# Patient Record
Sex: MALE | Race: WHITE | Employment: FULL TIME | URBAN - METROPOLITAN AREA
[De-identification: names, ages, dates, MRNs, and addresses within clinical notes are randomized per-mention and may not be internally consistent; named-entity substitution may affect disease eponyms.]

---

## 2018-05-16 ENCOUNTER — OFFICE VISIT (OUTPATIENT)
Dept: ORTHOPEDIC SURGERY | Age: 63
End: 2018-05-16

## 2018-05-16 VITALS
HEART RATE: 75 BPM | WEIGHT: 158 LBS | HEIGHT: 62 IN | DIASTOLIC BLOOD PRESSURE: 73 MMHG | BODY MASS INDEX: 29.08 KG/M2 | SYSTOLIC BLOOD PRESSURE: 131 MMHG

## 2018-05-16 DIAGNOSIS — M79.645 FINGER PAIN, LEFT: Primary | ICD-10-CM

## 2018-05-16 DIAGNOSIS — M18.12 ARTHRITIS OF CARPOMETACARPAL (CMC) JOINT OF LEFT THUMB: ICD-10-CM

## 2018-05-16 PROCEDURE — 99203 OFFICE O/P NEW LOW 30 MIN: CPT | Performed by: ORTHOPAEDIC SURGERY

## 2018-05-16 PROCEDURE — 20600 DRAIN/INJ JOINT/BURSA W/O US: CPT | Performed by: ORTHOPAEDIC SURGERY

## 2018-05-16 RX ORDER — MULTIVIT-MIN/IRON/FOLIC ACID/K 18-600-40
1 CAPSULE ORAL DAILY
COMMUNITY

## 2018-05-16 RX ORDER — SUMATRIPTAN 100 MG/1
100 TABLET, FILM COATED ORAL
COMMUNITY

## 2018-09-24 ENCOUNTER — TELEPHONE (OUTPATIENT)
Dept: ORTHOPEDIC SURGERY | Age: 63
End: 2018-09-24

## 2018-10-02 ENCOUNTER — OFFICE VISIT (OUTPATIENT)
Dept: ORTHOPEDIC SURGERY | Age: 63
End: 2018-10-02
Payer: COMMERCIAL

## 2018-10-02 DIAGNOSIS — M18.12 ARTHRITIS OF CARPOMETACARPAL (CMC) JOINT OF LEFT THUMB: Primary | ICD-10-CM

## 2018-10-02 PROCEDURE — 99213 OFFICE O/P EST LOW 20 MIN: CPT | Performed by: ORTHOPAEDIC SURGERY

## 2018-10-02 PROCEDURE — 20600 DRAIN/INJ JOINT/BURSA W/O US: CPT | Performed by: ORTHOPAEDIC SURGERY

## 2019-01-02 RX ORDER — METHYLPREDNISOLONE 4 MG/1
TABLET ORAL
Qty: 1 KIT | Refills: 0 | Status: SHIPPED | OUTPATIENT
Start: 2019-01-02 | End: 2019-01-08

## 2019-01-10 ENCOUNTER — TELEPHONE (OUTPATIENT)
Dept: ORTHOPEDIC SURGERY | Age: 64
End: 2019-01-10

## 2019-04-02 ENCOUNTER — OFFICE VISIT (OUTPATIENT)
Dept: ORTHOPEDIC SURGERY | Age: 64
End: 2019-04-02
Payer: MEDICARE

## 2019-04-02 DIAGNOSIS — M18.12 ARTHRITIS OF CARPOMETACARPAL (CMC) JOINT OF LEFT THUMB: Primary | ICD-10-CM

## 2019-04-02 PROCEDURE — 3017F COLORECTAL CA SCREEN DOC REV: CPT | Performed by: ORTHOPAEDIC SURGERY

## 2019-04-02 PROCEDURE — 1036F TOBACCO NON-USER: CPT | Performed by: ORTHOPAEDIC SURGERY

## 2019-04-02 PROCEDURE — G8419 CALC BMI OUT NRM PARAM NOF/U: HCPCS | Performed by: ORTHOPAEDIC SURGERY

## 2019-04-02 PROCEDURE — G8427 DOCREV CUR MEDS BY ELIG CLIN: HCPCS | Performed by: ORTHOPAEDIC SURGERY

## 2019-04-02 PROCEDURE — 99213 OFFICE O/P EST LOW 20 MIN: CPT | Performed by: ORTHOPAEDIC SURGERY

## 2019-04-02 NOTE — PROGRESS NOTES
Assessment: 79-year-old male with long-standing history of left thumb base pain  1. Left thumb CMC arthritis  2. 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 We discussed his x-rays today and also reviewed prior x-rays from May 2018 which do demonstrate degenerative changes in the wrist. He does have some minimal symptoms of wrist pain occasionally but on examination clinically as well as his description certainly the majority of his symptoms seem to be related more to the [facility] joint. He has also benefited in the past significantly from corticosteroid injections into the thumb [facility] joint  Options for treatment were discussed today including both operative and nonoperative intervention. The patient would like to consider operative intervention with left thumb CMC arthroplasty, trapezi ectomy with ligament reconstruction and tendon interposition using FCR tendon  Postoperative recovery including immobilization followed by hand therapy and limited use of the hand for 3 months was discussed with patient  Other risks benefits alternatives of surgery were discussed with patient at length today including but not limited to infection and bleeding damage to nerves and blood vessels need for additional procedures continued pain stiffness wrist stiffness numbness and tingling as well as risks of anesthesia including stroke heart attack blood clot and death  After thorough discussion patient is understanding would like to proceed with a forementioned left thumb CMC arthroplasty. We'll begin the process of surgery scheduling today, consent obtained in clinic    No follow-ups on file.          History of Present Illness  Jordin Strickland is a 58 y.o. male here today for a follow-up for his left thumb pain consistent with thumb CMC arthritis  The patient continues to describe pain near the base of his thumb with pinching gripping activities  He has been treated conservatively now for several years with an corticosteroid injection performed most recently by me in October 2018  Has had transient benefit in the past but the most recent injection did give him the least amount of benefit  He does continue with activity modification as well but continues to have symptoms. He is here today to discuss additional treatment options    Review of Systems  Pertinent items are noted in HPI  Review of systems reviewed from Patient History Form dated on 5/6/18 and available in the patient's chart under the Media tab. Vital Signs  There were no vitals filed for this visit. Physical Exam  Constitutional:  Patient is well-nourished and demonstrates normal hygiene. Mental Status:  Patient is alert and oriented to person, place and time. Skin:  Intact, no rashes or lesions. Left thumb(s):  No skin changes throughout left thumb and left hand  Full composite fist and full extension of all fingers and symmetrical range of motion of thumb compared with contralateral thumb  Tenderness is elicited with CMC grind  , crepitus with thumb CMC motion        There is pain on firm pressure over the trapeziometacarpal joint. Satisfactory stability          exists at the MP joint with slight hyperextension with active pinch MP joints apparent 10° proximally. 0  There is minimal tenderness to radial styloid and anatomic snuffbox, minimal discomfort with radial and ulnar deviation with no crepitus  Wrist range of motion approximately 55° of extension and 60° of flexion  Nontender thumb MP joint with no mechanical symptoms of clicking, catching or locking  No thenar atrophy or intrinsic wasting throughout left thumb and hand  5 out of 5 active EPL, FPL, thumb abduction           Contralateral thumb:  No tenderness with CMC gring or firm pressure over                                  trapeziometacarpal joint.  Satisfactory stability exists at MP joint.           Capillary refill brisk all fingers, symmetric  Gross sensation intact to light touch median/ulnar/radial nerves  Sensation intact to radial/ulnar aspect of fingertip        Radiology:    X-rays obtained and reviewed in office:  Stevie Cowman  Location left thumb  Impression degenerative changes involving the left thumb CMC joint with osteophyte formation and joint space narrowing  There is also evidence of scapholunate  widening and SLAC wrist with narrowing of radius scapho, joint. Additional Diagnostic Test Findings:    Office Procedures:  Orders Placed This Encounter   Procedures    XR FINGER LEFT (MIN 2 VIEWS)           Nancy Combs MD  Orthopaedic Surgeon, [address]    Contact Information:  [employer]: [phone] [id number] Clinical )    This dictation was performed with a verbal recognition program [facility]) and it was checked for errors. It is possible that there are still dictated errors within this office note. If so, please bring any errors to my attention for an addendum. All efforts were made to ensure that this office note is accurate.

## 2019-04-05 ENCOUNTER — TELEPHONE (OUTPATIENT)
Dept: ORTHOPEDIC SURGERY | Age: 64
End: 2019-04-05

## 2019-04-09 NOTE — PROGRESS NOTES
[facility] / [facility]) [address], [address]    Acknowledgment of Informed Consent for Surgical or Medical Procedure and Sedation  I agree to allow doctor(s) Faby Ayoub and his/her associates or assistants, including residents and/or other qualified medical practitioner to perform the following medical treatment or procedure and to administer or direct the administration of sedation as necessary:  Procedure(s): LEFT THUMB CARPOMETACARPAL ARTHROPLASTY INCLUDING TRAPEZIECTOMY, LIGAMENT RECONSTRUCTION AND TENDON INTERPOSITION WITH FLEXOR CARPI RADIALIS TENDON   My doctor has explained the following regarding the proposed procedure:   the explanation of the procedure   the benefits of the procedure   the potential problems that might occur during recuperation   the risks and side effects of the procedure which could include but are not limited to severe blood loss, infection, stroke or death   the benefits, risks and side effect of alternative procedures including the consequences of declining this procedure or any alternative procedures   the likelihood of achieving satisfactory results. I acknowledge no guarantee or assurance has been made to me regarding the results. I understand that during the course of this treatment/procedure, unforeseen conditions can occur which require an additional or different procedure. I agree to allow my physician or assistants to perform such extension of the original procedure as they may find necessary. I understand that sedation will often result in temporary impairment of memory and fine motor skills and that sedation can occasionally progress to a state of deep sedation or general anesthesia. I understand the risks of anesthesia for surgery include, but are not limited to, sore throat, hoarseness, injury to face, mouth, or teeth; nausea; headache; injury to blood vessels or nerves; death, brain damage, or paralysis.     I understand that if I have a Limitation of Treatment order in effect during my hospitalization, the order may or may not be in effect during this procedure. I give my doctor permission to give me blood or blood products. I understand that there are risks with receiving blood such as hepatitis, AIDS, fever, or allergic reaction. I acknowledge that the risks, benefits, and alternatives of this treatment have been explained to me and that no express or implied warranty has been given by the hospital, any blood bank, or any person or entity as to the blood or blood components transfused. At the discretion of my doctor, I agree to allow observers, equipment/product representatives and allow photographing, and/or televising of the procedure, provided my name or identity is maintained confidentially. I agree the hospital may dispose of or use for scientific or educational purposes any tissue, fluid, or body parts which may be removed.     ________________________________Date________Time______ am/pm  ([place] [facility])  Patient or Signature of Closest Relative or Legal Guardian    ________________________________Date________Time______am/pm      Page 1 of  1  Witness

## 2019-04-11 NOTE — PROGRESS NOTES
[address]                          Date of Procedure 4/15 Time of Procedure 1330    PRIOR TO PROCEDURE DATE:  1. Please follow any guidelines/instructions prior to your procedure as advised by your surgeon. 2. Arrange for someone to drive you home and be with you for the first 24 hours after discharge for your safety after your procedure for which you received sedation. Ensure it is someone we can share information with regarding your discharge. 3. You must contact your surgeon for instructions IF:   You are taking any blood thinners, aspirin, anti-inflammatory or vitamin E.   There is a change in your physical condition such as a cold, fever, rash, cuts, sores or any other infection, especially near your surgical site. 4. Do not drink alcohol the day before or day of your procedure. 5. A Pre-op History and Physical for surgery MUST be completed by your Physician or Urgent Care within 30 days of your procedure date. Please bring a copy with you on the day of your procedure and along with any other testing performed. THE DAY OF YOUR PROCEDURE:  1. Follow instructions for ARRIVAL TIME as DIRECTED BY YOUR SURGEON. If your surgeon does not give you a specific arrival time, please arrive at 1130  2. Enter the MAIN entrance from [address] and follow the signs to the free [employer] or [employer] parking (offered free of charge 6am-5pm). 3. Enter the Main Entrance of the hospital (do not enter from the lower level of the parking garage). Upon entrance, check in with the  at the main desk on your left. If no one is available at the desk, proceed into the [facility] Waiting Room and go through the door directly into the [facility]. There is a Check-in desk ACROSS from Room 5 (marked with a sign hanging from the ceiling). The phone number for the surgery center is [phone].     4. Please call [phone] option #2 option #2 if you have not been preregistered yet. On the day of your procedure bring your insurance card and photo ID. You will be registered at your bedside once brought back to your room. 5. DO NOT EAT ANYTHING eight hours prior to surgery. May have 8 ounces of water 4 hours prior to surgery. 6. MEDICATIONS    Take the following medications with a SMALL sip of water:   Aprazalam,  lotrel   omeprazole   May bring immitrex    Use your usual dose of inhalers the morning of surgery. BRING your rescue inhaler with you to hospital.    Anesthesia does NOT want you to take insulin the morning of surgery. They will control your blood sugar while you are at the hospital. Please contact your ordering physician for instructions regarding your insulin the night before your procedure. If you have an insulin pump, please keep it set on basal rate. 7. Do not swallow water when brushing teeth. No gum, candy, mints or ice chips. Refrain from smoking or at least decrease the amount. 8. Dress in loose, comfortable clothing appropriate for redressing after your procedure. Do not wear jewelry (including body piercings), make-up (especially NO eye make-up), fingernail polish (NO toenail polish if foot/leg surgery), lotion, powders or metal hairclips. 9. Dentures, glasses, or contacts will need to be removed before your procedure. Bring cases for your glasses, contacts, dentures, or hearing aids to protect them while you are in surgery. 10. If you use a CPAP, please bring it with you on the day of your procedure. 11. We recommend that valuable personal  belongings such as cash, cell phones, e-tablets or jewelry, be left at home during your stay. The hospital will not be responsible for valuables that are not secured in the hospital safe. However, if your insurance requires a co-pay, you may want to bring a method of payment, i.e. Check or credit card, if you wish to pay your co-pay the day of surgery.       12. If you are to stay overnight, you may bring a bag with personal items. Please have any large items you may need brought in by your family after your arrival to your hospital room. 15. If you have a Living Will or Durable Power of , please bring a copy on the day of your procedure. 15. With your permission, one family member may accompany you while you are being prepared for surgery. Once you are ready, additional family members may join you. HOW WE KEEP YOU SAFE and WORK TO PREVENT SURGICAL SITE INFECTIONS:  1. Health care workers should always check your ID bracelet to verify your name and birth date. You will be asked many times to state your name, date of birth, and allergies. 2. Health care workers should always clean their hands with soap or alcohol gel before providing care to you. It is okay to ask anyone if they cleaned their hands before they touch you. 3. You will be actively involved in verifying the type of procedure you are having and ensuring the correct surgical site. This will be confirmed multiple times prior to your procedure. Do NOT petr your surgery site UNLESS instructed to by your surgeon. 4. Do not shave or wax for 72 hours prior to procedure near your operative site. Shaving with a razor can irritate your skin and make it easier to develop an infection. On the day of your procedure, any hair that needs to be removed near the surgical site will be clipped by a healthcare worker using a special clippers designed to avoid skin irritation. 5. When you are in the operating room, your surgical site will be cleansed with a special soap, and in most cases, you will be given an antibiotic before the surgery begins. What to expect AFTER YOUR PROCEDURE:  1. Immediately following your procedure, your will be taken to the PACU for the first phase of your recovery.   Your nurse will help you recover from any potential side effects of anesthesia, such as extreme drowsiness, changes in your vital signs or breathing patterns. Nausea, headache, muscle aches, or sore throat may also occur after anesthesia. Your nurse will help you manage these potential side effects. 2. For comfort and safety, arrange to have someone at home with you for the first 24 hours after discharge. 3. You and your family will be given written instructions about your diet, activity, dressing care, medications, and return visits. 4. Once at home, should issues with nausea, pain, or bleeding occur, or should you notice any signs of infection, you should call your surgeon. 5. Always clean your hands before and after caring for your wound. Do not let your family touch your surgery site without cleaning their hands. 6. Narcotic pain medications can cause significant constipation. You may want to add a stool softener to your postoperative medication schedule or speak to your surgeon on how best to manage this SIDE EFFECT. SPECIAL INSTRUCTIONS   Thank you for allowing us to care for you. We strive to exceed your expectations in the delivery of care and service provided to you and your family. If you need to contact us for any reason, please call us at [phone]    Instructions reviewed with patient during preadmission testing phone interview. Elvira Oconnor. 4/11/2019 .3:13 PM      ADDITIONAL EDUCATIONAL INFORMATION REVIEWED PER PHONE WITH YOU AND/OR YOUR FAMILY:  No Bring a urine sample on day of surgery  Yes Pain Goal-Taking Control of Your Pain  Yes FAQs about Surgical Site Infections   noHibiclens® Bathing Instructions   Yes Antibacterial Soap  Yes Amos® Wipes Bathing Instructions (Obtained from: https://www.InSample/. pdf )  No Incentive Spirometer Education  No Other

## 2019-04-12 ENCOUNTER — ANESTHESIA EVENT (OUTPATIENT)
Dept: OPERATING ROOM | Age: 64
End: 2019-04-12
Payer: MEDICARE

## 2019-04-15 ENCOUNTER — HOSPITAL ENCOUNTER (OUTPATIENT)
Age: 64
Setting detail: OUTPATIENT SURGERY
Discharge: HOME | End: 2019-04-15
Attending: ORTHOPAEDIC SURGERY | Admitting: ORTHOPAEDIC SURGERY
Payer: MEDICARE

## 2019-04-15 ENCOUNTER — ANESTHESIA (OUTPATIENT)
Dept: OPERATING ROOM | Age: 64
End: 2019-04-15
Payer: MEDICARE

## 2019-04-15 VITALS
TEMPERATURE: 98.5 F | BODY MASS INDEX: 27.23 KG/M2 | DIASTOLIC BLOOD PRESSURE: 79 MMHG | HEART RATE: 87 BPM | SYSTOLIC BLOOD PRESSURE: 143 MMHG | WEIGHT: 148 LBS | RESPIRATION RATE: 18 BRPM | OXYGEN SATURATION: 95 % | HEIGHT: 62 IN

## 2019-04-15 VITALS — DIASTOLIC BLOOD PRESSURE: 64 MMHG | OXYGEN SATURATION: 99 % | SYSTOLIC BLOOD PRESSURE: 115 MMHG | TEMPERATURE: 95.5 F

## 2019-04-15 DIAGNOSIS — M18.12 ARTHRITIS OF CARPOMETACARPAL (CMC) JOINT OF LEFT THUMB: Primary | ICD-10-CM

## 2019-04-15 PROCEDURE — 2500000003 HC RX 250 WO HCPCS: Performed by: ORTHOPAEDIC SURGERY

## 2019-04-15 PROCEDURE — 6360000002 HC RX W HCPCS: Performed by: NURSE ANESTHETIST, CERTIFIED REGISTERED

## 2019-04-15 PROCEDURE — 3700000000 HC ANESTHESIA ATTENDED CARE: Performed by: ORTHOPAEDIC SURGERY

## 2019-04-15 PROCEDURE — 6370000000 HC RX 637 (ALT 250 FOR IP): Performed by: ORTHOPAEDIC SURGERY

## 2019-04-15 PROCEDURE — 7100000011 HC PHASE II RECOVERY - ADDTL 15 MIN: Performed by: ORTHOPAEDIC SURGERY

## 2019-04-15 PROCEDURE — 3700000001 HC ADD 15 MINUTES (ANESTHESIA): Performed by: ORTHOPAEDIC SURGERY

## 2019-04-15 PROCEDURE — 3600000014 HC SURGERY LEVEL 4 ADDTL 15MIN: Performed by: ORTHOPAEDIC SURGERY

## 2019-04-15 PROCEDURE — 2709999900 HC NON-CHARGEABLE SUPPLY: Performed by: ORTHOPAEDIC SURGERY

## 2019-04-15 PROCEDURE — 6360000002 HC RX W HCPCS: Performed by: ANESTHESIOLOGY

## 2019-04-15 PROCEDURE — 7100000001 HC PACU RECOVERY - ADDTL 15 MIN: Performed by: ORTHOPAEDIC SURGERY

## 2019-04-15 PROCEDURE — 2580000003 HC RX 258: Performed by: ANESTHESIOLOGY

## 2019-04-15 PROCEDURE — 7100000000 HC PACU RECOVERY - FIRST 15 MIN: Performed by: ORTHOPAEDIC SURGERY

## 2019-04-15 PROCEDURE — 2580000003 HC RX 258: Performed by: ORTHOPAEDIC SURGERY

## 2019-04-15 PROCEDURE — 3600000004 HC SURGERY LEVEL 4 BASE: Performed by: ORTHOPAEDIC SURGERY

## 2019-04-15 PROCEDURE — 6360000002 HC RX W HCPCS: Performed by: ORTHOPAEDIC SURGERY

## 2019-04-15 PROCEDURE — 7100000010 HC PHASE II RECOVERY - FIRST 15 MIN: Performed by: ORTHOPAEDIC SURGERY

## 2019-04-15 PROCEDURE — 64415 NJX AA&/STRD BRCH PLXS IMG: CPT | Performed by: ANESTHESIOLOGY

## 2019-04-15 RX ORDER — PROPOFOL 10 MG/ML
INJECTION, EMULSION INTRAVENOUS PRN
Status: DISCONTINUED | OUTPATIENT
Start: 2019-04-15 | End: 2019-04-15 | Stop reason: SDUPTHER

## 2019-04-15 RX ORDER — ONDANSETRON 4 MG/1
4 TABLET, FILM COATED ORAL EVERY 8 HOURS PRN
Qty: 20 TABLET | Refills: 0 | Status: SHIPPED | OUTPATIENT
Start: 2019-04-15

## 2019-04-15 RX ORDER — SODIUM CHLORIDE 0.9 % (FLUSH) 0.9 %
10 SYRINGE (ML) INJECTION PRN
Status: DISCONTINUED | OUTPATIENT
Start: 2019-04-15 | End: 2019-04-15 | Stop reason: HOSPADM

## 2019-04-15 RX ORDER — MEPERIDINE HYDROCHLORIDE 25 MG/ML
12.5 INJECTION INTRAMUSCULAR; INTRAVENOUS; SUBCUTANEOUS EVERY 5 MIN PRN
Status: DISCONTINUED | OUTPATIENT
Start: 2019-04-15 | End: 2019-04-15 | Stop reason: HOSPADM

## 2019-04-15 RX ORDER — GINSENG 100 MG
CAPSULE ORAL PRN
Status: DISCONTINUED | OUTPATIENT
Start: 2019-04-15 | End: 2019-04-15 | Stop reason: ALTCHOICE

## 2019-04-15 RX ORDER — LIDOCAINE HYDROCHLORIDE 10 MG/ML
1 INJECTION, SOLUTION EPIDURAL; INFILTRATION; INTRACAUDAL; PERINEURAL
Status: DISCONTINUED | OUTPATIENT
Start: 2019-04-15 | End: 2019-04-15 | Stop reason: HOSPADM

## 2019-04-15 RX ORDER — MAGNESIUM HYDROXIDE 1200 MG/15ML
LIQUID ORAL CONTINUOUS PRN
Status: COMPLETED | OUTPATIENT
Start: 2019-04-15 | End: 2019-04-15

## 2019-04-15 RX ORDER — DEXAMETHASONE SODIUM PHOSPHATE 4 MG/ML
INJECTION, SOLUTION INTRA-ARTICULAR; INTRALESIONAL; INTRAMUSCULAR; INTRAVENOUS; SOFT TISSUE PRN
Status: DISCONTINUED | OUTPATIENT
Start: 2019-04-15 | End: 2019-04-15 | Stop reason: SDUPTHER

## 2019-04-15 RX ORDER — HYDROCODONE BITARTRATE AND ACETAMINOPHEN 5; 325 MG/1; MG/1
1 TABLET ORAL EVERY 6 HOURS PRN
Qty: 18 TABLET | Refills: 0 | Status: SHIPPED | OUTPATIENT
Start: 2019-04-15 | End: 2019-04-20

## 2019-04-15 RX ORDER — SODIUM CHLORIDE, SODIUM LACTATE, POTASSIUM CHLORIDE, CALCIUM CHLORIDE 600; 310; 30; 20 MG/100ML; MG/100ML; MG/100ML; MG/100ML
INJECTION, SOLUTION INTRAVENOUS CONTINUOUS
Status: DISCONTINUED | OUTPATIENT
Start: 2019-04-15 | End: 2019-04-15 | Stop reason: HOSPADM

## 2019-04-15 RX ORDER — LIDOCAINE HYDROCHLORIDE 10 MG/ML
INJECTION, SOLUTION EPIDURAL; INFILTRATION; INTRACAUDAL; PERINEURAL PRN
Status: DISCONTINUED | OUTPATIENT
Start: 2019-04-15 | End: 2019-04-15 | Stop reason: ALTCHOICE

## 2019-04-15 RX ORDER — ONDANSETRON 2 MG/ML
4 INJECTION INTRAMUSCULAR; INTRAVENOUS
Status: DISCONTINUED | OUTPATIENT
Start: 2019-04-15 | End: 2019-04-15 | Stop reason: HOSPADM

## 2019-04-15 RX ORDER — DEXAMETHASONE SODIUM PHOSPHATE 4 MG/ML
4 INJECTION, SOLUTION INTRA-ARTICULAR; INTRALESIONAL; INTRAMUSCULAR; INTRAVENOUS; SOFT TISSUE
Status: DISCONTINUED | OUTPATIENT
Start: 2019-04-15 | End: 2019-04-15 | Stop reason: HOSPADM

## 2019-04-15 RX ORDER — ROPIVACAINE HYDROCHLORIDE 5 MG/ML
INJECTION, SOLUTION EPIDURAL; INFILTRATION; PERINEURAL
Status: COMPLETED
Start: 2019-04-15 | End: 2019-04-15

## 2019-04-15 RX ORDER — ACETAMINOPHEN 500 MG
1000 TABLET ORAL PRN
COMMUNITY

## 2019-04-15 RX ORDER — TRAMADOL HYDROCHLORIDE 50 MG/1
50 TABLET ORAL 2 TIMES DAILY
COMMUNITY

## 2019-04-15 RX ORDER — SODIUM CHLORIDE 0.9 % (FLUSH) 0.9 %
10 SYRINGE (ML) INJECTION EVERY 12 HOURS SCHEDULED
Status: DISCONTINUED | OUTPATIENT
Start: 2019-04-15 | End: 2019-04-15 | Stop reason: HOSPADM

## 2019-04-15 RX ORDER — AMPICILLIN TRIHYDRATE 250 MG
2 CAPSULE ORAL DAILY
COMMUNITY

## 2019-04-15 RX ORDER — FENTANYL CITRATE 50 UG/ML
50 INJECTION, SOLUTION INTRAMUSCULAR; INTRAVENOUS EVERY 5 MIN PRN
Status: DISCONTINUED | OUTPATIENT
Start: 2019-04-15 | End: 2019-04-15 | Stop reason: HOSPADM

## 2019-04-15 RX ORDER — ROPIVACAINE HYDROCHLORIDE 5 MG/ML
INJECTION, SOLUTION EPIDURAL; INFILTRATION; PERINEURAL PRN
Status: DISCONTINUED | OUTPATIENT
Start: 2019-04-15 | End: 2019-04-15 | Stop reason: SDUPTHER

## 2019-04-15 RX ORDER — MAGNESIUM CARB/ALUMINUM HYDROX 105-160MG
TABLET,CHEWABLE ORAL PRN
Status: DISCONTINUED | OUTPATIENT
Start: 2019-04-15 | End: 2019-04-15 | Stop reason: ALTCHOICE

## 2019-04-15 RX ORDER — FENTANYL CITRATE 50 UG/ML
25 INJECTION, SOLUTION INTRAMUSCULAR; INTRAVENOUS EVERY 5 MIN PRN
Status: DISCONTINUED | OUTPATIENT
Start: 2019-04-15 | End: 2019-04-15 | Stop reason: HOSPADM

## 2019-04-15 RX ORDER — ONDANSETRON 2 MG/ML
INJECTION INTRAMUSCULAR; INTRAVENOUS PRN
Status: DISCONTINUED | OUTPATIENT
Start: 2019-04-15 | End: 2019-04-15 | Stop reason: SDUPTHER

## 2019-04-15 RX ORDER — FENTANYL CITRATE 50 UG/ML
INJECTION, SOLUTION INTRAMUSCULAR; INTRAVENOUS PRN
Status: DISCONTINUED | OUTPATIENT
Start: 2019-04-15 | End: 2019-04-15 | Stop reason: SDUPTHER

## 2019-04-15 RX ORDER — BUPIVACAINE HYDROCHLORIDE 2.5 MG/ML
INJECTION, SOLUTION EPIDURAL; INFILTRATION; INTRACAUDAL PRN
Status: DISCONTINUED | OUTPATIENT
Start: 2019-04-15 | End: 2019-04-15 | Stop reason: ALTCHOICE

## 2019-04-15 RX ADMIN — ROPIVACAINE HYDROCHLORIDE 30 ML: 5 INJECTION, SOLUTION EPIDURAL; INFILTRATION; PERINEURAL at 16:57

## 2019-04-15 RX ADMIN — DEXAMETHASONE SODIUM PHOSPHATE 4 MG: 4 INJECTION, SOLUTION INTRAMUSCULAR; INTRAVENOUS at 14:28

## 2019-04-15 RX ADMIN — Medication 2 G: at 13:41

## 2019-04-15 RX ADMIN — FENTANYL CITRATE 100 MCG: 50 INJECTION INTRAMUSCULAR; INTRAVENOUS at 14:19

## 2019-04-15 RX ADMIN — SODIUM CHLORIDE, SODIUM LACTATE, POTASSIUM CHLORIDE, AND CALCIUM CHLORIDE: 600; 310; 30; 20 INJECTION, SOLUTION INTRAVENOUS at 13:34

## 2019-04-15 RX ADMIN — ONDANSETRON 4 MG: 2 INJECTION INTRAMUSCULAR; INTRAVENOUS at 14:28

## 2019-04-15 RX ADMIN — SODIUM CHLORIDE, SODIUM LACTATE, POTASSIUM CHLORIDE, AND CALCIUM CHLORIDE: 600; 310; 30; 20 INJECTION, SOLUTION INTRAVENOUS at 12:51

## 2019-04-15 RX ADMIN — PROPOFOL 180 MG: 10 INJECTION, EMULSION INTRAVENOUS at 13:38

## 2019-04-15 RX ADMIN — HYDROMORPHONE HYDROCHLORIDE 0.5 MG: 1 INJECTION, SOLUTION INTRAMUSCULAR; INTRAVENOUS; SUBCUTANEOUS at 16:30

## 2019-04-15 RX ADMIN — FENTANYL CITRATE 100 MCG: 50 INJECTION INTRAMUSCULAR; INTRAVENOUS at 14:00

## 2019-04-15 RX ADMIN — SODIUM CHLORIDE, SODIUM LACTATE, POTASSIUM CHLORIDE, AND CALCIUM CHLORIDE: 600; 310; 30; 20 INJECTION, SOLUTION INTRAVENOUS at 15:18

## 2019-04-15 ASSESSMENT — PULMONARY FUNCTION TESTS
PIF_VALUE: 23
PIF_VALUE: 17
PIF_VALUE: 20
PIF_VALUE: 21
PIF_VALUE: 17
PIF_VALUE: 17
PIF_VALUE: 15
PIF_VALUE: 15
PIF_VALUE: 23
PIF_VALUE: 15
PIF_VALUE: 1
PIF_VALUE: 17
PIF_VALUE: 17
PIF_VALUE: 16
PIF_VALUE: 15
PIF_VALUE: 20
PIF_VALUE: 15
PIF_VALUE: 23
PIF_VALUE: 17
PIF_VALUE: 23
PIF_VALUE: 17
PIF_VALUE: 16
PIF_VALUE: 23
PIF_VALUE: 20
PIF_VALUE: 23
PIF_VALUE: 23
PIF_VALUE: 17
PIF_VALUE: 1
PIF_VALUE: 20
PIF_VALUE: 8
PIF_VALUE: 17
PIF_VALUE: 18
PIF_VALUE: 17
PIF_VALUE: 1
PIF_VALUE: 23
PIF_VALUE: 20
PIF_VALUE: 15
PIF_VALUE: 1
PIF_VALUE: 17
PIF_VALUE: 20
PIF_VALUE: 2
PIF_VALUE: 17
PIF_VALUE: 23
PIF_VALUE: 23
PIF_VALUE: 17
PIF_VALUE: 20
PIF_VALUE: 17
PIF_VALUE: 15
PIF_VALUE: 17
PIF_VALUE: 23
PIF_VALUE: 17
PIF_VALUE: 20
PIF_VALUE: 17
PIF_VALUE: 23
PIF_VALUE: 5
PIF_VALUE: 23
PIF_VALUE: 17
PIF_VALUE: 17
PIF_VALUE: 15
PIF_VALUE: 24
PIF_VALUE: 15
PIF_VALUE: 17
PIF_VALUE: 1
PIF_VALUE: 17
PIF_VALUE: 17
PIF_VALUE: 23
PIF_VALUE: 23
PIF_VALUE: 17
PIF_VALUE: 15
PIF_VALUE: 15
PIF_VALUE: 23
PIF_VALUE: 23
PIF_VALUE: 15
PIF_VALUE: 17
PIF_VALUE: 1
PIF_VALUE: 17
PIF_VALUE: 17
PIF_VALUE: 18
PIF_VALUE: 17
PIF_VALUE: 23
PIF_VALUE: 17
PIF_VALUE: 1
PIF_VALUE: 17
PIF_VALUE: 23
PIF_VALUE: 17
PIF_VALUE: 17
PIF_VALUE: 23
PIF_VALUE: 0
PIF_VALUE: 17
PIF_VALUE: 17
PIF_VALUE: 1
PIF_VALUE: 17
PIF_VALUE: 23
PIF_VALUE: 15
PIF_VALUE: 17
PIF_VALUE: 23
PIF_VALUE: 15
PIF_VALUE: 23
PIF_VALUE: 17
PIF_VALUE: 17
PIF_VALUE: 20
PIF_VALUE: 17
PIF_VALUE: 23
PIF_VALUE: 18
PIF_VALUE: 17
PIF_VALUE: 1
PIF_VALUE: 23
PIF_VALUE: 17
PIF_VALUE: 23
PIF_VALUE: 17
PIF_VALUE: 20
PIF_VALUE: 23
PIF_VALUE: 15
PIF_VALUE: 17
PIF_VALUE: 20
PIF_VALUE: 17

## 2019-04-15 ASSESSMENT — PAIN SCALES - GENERAL
PAINLEVEL_OUTOF10: 5
PAINLEVEL_OUTOF10: 9
PAINLEVEL_OUTOF10: 0
PAINLEVEL_OUTOF10: 3
PAINLEVEL_OUTOF10: 0
PAINLEVEL_OUTOF10: 3

## 2019-04-15 ASSESSMENT — PAIN DESCRIPTION - DESCRIPTORS
DESCRIPTORS: ACHING
DESCRIPTORS: ACHING

## 2019-04-15 ASSESSMENT — PAIN DESCRIPTION - FREQUENCY
FREQUENCY: CONTINUOUS
FREQUENCY: CONTINUOUS

## 2019-04-15 ASSESSMENT — PAIN DESCRIPTION - PAIN TYPE
TYPE: CHRONIC PAIN
TYPE: SURGICAL PAIN

## 2019-04-15 ASSESSMENT — PAIN DESCRIPTION - ORIENTATION
ORIENTATION: RIGHT;LEFT;LOWER
ORIENTATION: LEFT

## 2019-04-15 ASSESSMENT — PAIN DESCRIPTION - LOCATION
LOCATION: BACK;FINGER (COMMENT WHICH ONE)
LOCATION: HAND

## 2019-04-15 ASSESSMENT — PAIN DESCRIPTION - PROGRESSION
CLINICAL_PROGRESSION: GRADUALLY IMPROVING
CLINICAL_PROGRESSION: NOT CHANGED

## 2019-04-15 ASSESSMENT — PAIN DESCRIPTION - ONSET: ONSET: ON-GOING

## 2019-04-15 NOTE — PROGRESS NOTES
Pt to SDS alert; oriented X 4; speech clear; breathing easily on RA; states pain in left and right thumbs and lower back is 5/10, chronic; Ancef 2 gm will go with pt to OR. Pt states he spoke with anesthesia regarding his intolerance to specific anesthesia, and same was placed on his allergy chart. Call light within reach; wife at bedside.

## 2019-04-15 NOTE — BRIEF OP NOTE
Brief Postoperative Note  ______________________________________________________________    Patient: Jose E Precise  YOB: 1955  MRN: [id number]  Date of Procedure: 4/15/2019    Pre-Op Diagnosis: arthritis  of carpometacarpal joint of left thumb    Post-Op Diagnosis: Same       Procedure(s):  1 LEFT THUMB CARPOMETACARPAL ARTHROPALSTY INCLUDING TRAPEZIECTOMY, LIGAMENT RECONSTRUCTION AND TENDON INTERPOSITION WITH FLEXOR CARPI RADIALIS TENDON  2.  Interpretation of fluoroscopy 3 views left hand      Anesthesia: General, Local    Surgeon(s):  Ayla Nelson MD    Assistant: [facility]     Estimated Blood Loss (mL): 28FB    Complications: None immediate apparent    Specimens:   none    Implants:  none      Drains: none  Findings: see fully dictated operative report    Macie Kay MD  Date: 4/15/2019  Time: 4:02 PM

## 2019-04-15 NOTE — PROGRESS NOTES
Ambulatory Surgery/Procedure Discharge Note    Vitals:    04/15/19 1735   BP: (!) 143/79   Pulse: 87   Resp: 18   Temp: 98.5 °F (36.9 °C)   SpO2: 95%       In: 1175 [P.O.:50; I.V.:1125]  Out: -     Restroom use offered before discharge. Yes/voided    Pain assessment:  {Pain assessment:  Pain Level: 3 on arrival to [facility] then he denied pain. 1740 Denies pain. Sling on left arm. Clean left arm dressing. Left fingers pink warm with brisk refill. Moving fingers left hand. Has sensation left fingers. Sling on. Elbow lower than hand. Discharge instructions reviewed. Patient and wife educated, using the teach back method, about follow up instructions and discharge instructions. A completed copy of the AVS instructions given to patient and all questions answered. Needs to be encouraged to accept help with dressing because with moving about he becomes unsteady while dressing but refuses help. Left fingers are warm pink with instant refill. Can move fingers/did not ask him to move thumb. No nausea. 1825 BP within 20% of pre op      Patient discharged to home/self care. Patient discharged via wheel chair by RN to waiting family/S.O.   Steady when up      4/15/2019 5:42 PM

## 2019-04-15 NOTE — ANESTHESIA POSTPROCEDURE EVALUATION
Department of Anesthesiology  Postprocedure Note    Patient: Abhilash Davalos  MRN: [id number]  YOB: 1955  Date of evaluation: 4/15/2019  Time:  5:40 PM     Procedure Summary     Date:  04/15/19 Room / Location:  [place] [address] / [facility] OR    Anesthesia Start:  6137 Anesthesia Stop:  1473    Procedure:  LEFT THUMB CARPOMETACARPAL ARTHROPALSTY INCLUDING TRAPEZIECTOMY, LIGAMENT RECONSTRUCTION AND TENDON INTERPOSITION WITH FLEXOR CARPI RADIALIS TENDON (Left ) Diagnosis:  (arthritis  of carpometacarpal joint of left thumb)    Surgeon:  Carlos Dailey MD Responsible Provider:  Carmel Bennett DO    Anesthesia Type:  general ASA Status:  2          Anesthesia Type: general    Derrick Phase I: Derrick Score: 8    Derrick Phase II:      Last vitals: Reviewed and per EMR flowsheets.        Anesthesia Post Evaluation    Patient location during evaluation: PACU  Patient participation: complete - patient participated  Level of consciousness: awake and alert  Pain score: 0  Airway patency: patent  Nausea & Vomiting: no nausea and no vomiting  Cardiovascular status: blood pressure returned to baseline  Respiratory status: acceptable  Hydration status: euvolemic  Comments: Postop supraclavicular nerve block done in PACU

## 2019-04-15 NOTE — ANESTHESIA PRE PROCEDURE
Department of Anesthesiology  Preprocedure Note       Name:  Vinny Thompson   Age:  58 y.o.  :  1955                                          MRN:  [id number]         Date:  4/15/2019      Surgeon: Lieutenant Fajardo):  Anna Diego MD    Procedure: LEFT THUMB CARPOMETACARPAL ARTHROPALSTY INCLUDING TRAPEZIECTOMY, LIGAMENT RECONSTRUCTION AND TENDON INTERPOSITION WITH FLEXOR CARPI RADIALIS TENDON (Left )    Medications prior to admission:   Prior to Admission medications    Medication Sig Start Date End Date Taking? Authorizing Provider   acetaminophen (TYLENOL) 500 MG tablet Take 1,000 mg by mouth as needed for Pain (pt rarely takes)   Yes Historical Provider, MD   Coenzyme Q10 (COQ-10) 200 MG CAPS Take 1 capsule by mouth daily   Yes Historical Provider, MD   traMADol (ULTRAM) 50 MG tablet Take 50 mg by mouth 2 times daily. Yes Historical Provider, MD   Cinnamon 500 MG CAPS Take 2 capsules by mouth daily   Yes Historical Provider, MD   diclofenac sodium 1 % GEL Apply 2 g topically 2 times daily   Yes Historical Provider, MD   Cholecalciferol (VITAMIN D) 2000 units CAPS capsule Take 1 capsule by mouth daily    Yes Historical Provider, MD   amLODIPine-benazepril (LOTREL) 5-10 MG per capsule Take 1 capsule by mouth daily. Yes Historical Provider, MD   fexofenadine (ALLEGRA) 180 MG tablet Take 180 mg by mouth nightly    Yes Historical Provider, MD   atorvastatin (LIPITOR) 10 MG tablet Take 10 mg by mouth nightly    Yes Historical Provider, MD   BUSPIRONE HCL PO Take 10 mg by mouth 2 times daily    Yes Historical Provider, MD   fluticasone (FLONASE) 50 MCG/ACT nasal spray 2 sprays by Nasal route nightly    Yes Historical Provider, MD   ALPRAZolam (XANAX) 1 MG tablet Take 1 mg by mouth daily.     Yes Historical Provider, MD   Omeprazole (PRILOSEC PO) Take 20 mg by mouth daily    Yes Historical Provider, MD   Chromium 1000 MCG TABS Take 2 tablets by mouth daily    Yes Historical Provider, MD   meloxicam (MOBIC) 15 MG tablet Take 15 mg by mouth daily. Yes Historical Provider, MD   SUMAtriptan (IMITREX) 100 MG tablet Take 100 mg by mouth once as needed for Migraine    Historical Provider, MD       Current medications:    Current Facility-Administered Medications   Medication Dose Route Frequency Provider Last Rate Last Dose    ceFAZolin (ANCEF) 2 g in dextrose 5 % 50 mL IVPB  2 g Intravenous Once Gumaro Billingsley MD        lactated ringers infusion   Intravenous Continuous Won Monet MD        sodium chloride flush 0.9 % injection 10 mL  10 mL Intravenous 2 times per day Won Monet MD        sodium chloride flush 0.9 % injection 10 mL  10 mL Intravenous PRN Won Monet MD        lidocaine PF 1 % injection 1 mL  1 mL Intradermal Once PRN Won Monet MD           Allergies:     Allergies   Allergen Reactions    Bee Venom Anaphylaxis    Blue Dyes (Parenteral)      In clothing  brilliant blue FCF (fd&Blue 31        Problem List:    Patient Active Problem List   Diagnosis Code    Arthritis of carpometacarpal (CMC) joint of left thumb M18.12       Past Medical History:        Diagnosis Date    Anesthesia     F/H  of psuedocholenesterase  intolerance    Arthritis     Hyperlipidemia     Hypertension        Past Surgical History:        Procedure Laterality Date    CERVICAL FUSION      FINGER TRIGGER RELEASE      several    KNEE CARTILAGE SURGERY Left        Social History:    Social History     Tobacco Use    Smoking status: Never Smoker    Smokeless tobacco: Never Used   Substance Use Topics    Alcohol use: Yes     Comment: 1 to 2 drinks/week                                Counseling given: Not Answered      Vital Signs (Current):   Vitals:    04/11/19 1457 04/15/19 1126   BP:  115/71   Pulse:  69   Resp:  16   Temp:  97.7 °F (36.5 °C)   TempSrc:  Oral   SpO2:  98%   Weight: 148 lb (67.1 kg) 148 lb (67.1 kg)   Height: 5' 2\" (1.575 m) 5' 2\" (1.575 m)                                              BP Readings from Last 3 Encounters:   04/15/19 115/71   05/16/18 131/73   01/02/15 150/79       NPO Status: Time of last liquid consumption: 0719                        Time of last solid consumption: 2300                        Date of last liquid consumption: 04/15/19                        Date of last solid food consumption: 04/14/19    BMI:   Wt Readings from Last 3 Encounters:   04/15/19 148 lb (67.1 kg)   05/16/18 158 lb (71.7 kg)   01/02/15 151 lb (68.5 kg)     Body mass index is 27.07 kg/m². CBC: No results found for: WBC, RBC, HGB, HCT, MCV, RDW, PLT    CMP: No results found for: NA, K, CL, CO2, BUN, CREATININE, GFRAA, AGRATIO, LABGLOM, GLUCOSE, PROT, CALCIUM, BILITOT, ALKPHOS, AST, ALT    POC Tests: No results for input(s): POCGLU, POCNA, POCK, POCCL, POCBUN, POCHEMO, POCHCT in the last 72 hours. Coags: No results found for: PROTIME, INR, APTT    HCG (If Applicable): No results found for: PREGTESTUR, PREGSERUM, HCG, HCGQUANT     ABGs: No results found for: PHART, PO2ART, JQL0LID, FYY7SKE, BEART, M6DDCRGF     Type & Screen (If Applicable):  No results found for: LABABO, LABRH    Anesthesia Evaluation   history of anesthetic complications:   Airway: Mallampati: II  TM distance: >3 FB   Neck ROM: limited  Mouth opening: > = 3 FB Dental:          Pulmonary:                              Cardiovascular:                      Neuro/Psych:               GI/Hepatic/Renal:             Endo/Other:                     Abdominal:           Vascular:                                        Anesthesia Plan      general     ASA 2       Induction: intravenous and inhalational.    MIPS: Postoperative opioids intended and Prophylactic antiemetics administered. Anesthetic plan and risks discussed with patient.         Attending anesthesiologist reviewed and agrees with Margareth Costa MD   4/15/2019

## 2019-04-15 NOTE — H&P
[address] Same Day Surgery Update H & P  Department of General Surgery   Surgical Service   Pre-operative History and Physical  Last H & P within the last 30 days. DIAGNOSIS:   arthritis  of carpometacarpal joint of left thumb    PROCEDURE:  [place] FIX COLLAT NORM,VIVIENP JUANITA,CHANDANA-P JUANITA [[id number]] (LEFT THUMB CARPOMETACARPAL ARTHROPALSTY INCLUDING TRAPEZIECTOMY, LIGAMENT RECONSTRUCTION AND TENDON INTERPOSITION WITH FLEXOR CARPI RADIALIS TENDON)     HISTORY OF PRESENT ILLNESS:    Patient with chronic left thumb pain and limited ROM in the setting of arthrosis. The symptoms have been recalcitrant to conservative treatment and the patient presents today for the above procedure. Past Medical History:        Diagnosis Date    Anesthesia     F/H  of psuedocholenesterase  intolerance    Arthritis     Hyperlipidemia     Hypertension      Past Surgical History:        Procedure Laterality Date    CERVICAL FUSION      FINGER TRIGGER RELEASE      several    KNEE CARTILAGE SURGERY Left      Past Social History:  Social History     Socioeconomic History    Marital status:      Spouse name: None    Number of children: None    Years of education: None    Highest education level: None   Occupational History    None   Social Needs    Financial resource strain: None    Food insecurity:     Worry: None     Inability: None    Transportation needs:     Medical: None     Non-medical: None   Tobacco Use    Smoking status: Never Smoker    Smokeless tobacco: Never Used   Substance and Sexual Activity    Alcohol use:  Yes    Drug use: Never    Sexual activity: None   Lifestyle    Physical activity:     Days per week: None     Minutes per session: None    Stress: None   Relationships    Social connections:     Talks on phone: None     Gets together: None     Attends Mandaeism service: None     Active member of club or organization: None     Attends meetings of clubs or organizations: None     Relationship status: None    Intimate partner violence:     Fear of current or ex partner: None     Emotionally abused: None     Physically abused: None     Forced sexual activity: None   Other Topics Concern    None   Social History Narrative    None         Medications Prior to Admission:      Prior to Admission medications    Medication Sig Start Date End Date Taking? Authorizing Provider   diclofenac sodium 1 % GEL Apply 2 g topically 2 times daily   Yes Historical Provider, MD   Cholecalciferol (VITAMIN D) 2000 units CAPS capsule Take by mouth   Yes Historical Provider, MD   SUMAtriptan (IMITREX) 100 MG tablet Take 100 mg by mouth once as needed for Migraine   Yes Historical Provider, MD   amLODIPine-benazepril (LOTREL) 5-10 MG per capsule Take 1 capsule by mouth daily. Yes Historical Provider, MD   fexofenadine (ALLEGRA) 180 MG tablet Take 180 mg by mouth daily. Yes Historical Provider, MD   atorvastatin (LIPITOR) 10 MG tablet Take 10 mg by mouth daily. Yes Historical Provider, MD   BUSPIRONE HCL PO Take 10 mg of ampicillin by mouth    Yes Historical Provider, MD   fluticasone (FLONASE) 50 MCG/ACT nasal spray 1 spray by Nasal route daily. Yes Historical Provider, MD   ALPRAZolam [place]) 1 MG tablet Take 1 mg by mouth nightly as needed for Sleep. Yes Historical Provider, MD   Omeprazole (PRILOSEC PO) Take  by mouth. Yes Historical Provider, MD   traMADol-acetaminophen (ULTRACET) 37.5-325 MG per tablet Take 1 tablet by mouth every 6 hours as needed for Pain. Yes Historical Provider, MD   Chromium 1000 MCG TABS Take  by mouth. Yes Historical Provider, MD   meloxicam (MOBIC) 15 MG tablet Take 15 mg by mouth daily. Yes Historical Provider, MD   Coenzyme Q10 (CO Q 10) 10 MG CAPS Take  by mouth. Yes Historical Provider, MD   Glucosamine-Chondroit-Vit C-Mn (GLUCOSAMINE CHONDR 1500 COMPLX PO) Take  by mouth.    Yes Historical Provider, MD   acetaminophen (TYLENOL) 325 MG tablet Take 650 mg by mouth every 6 hours as needed for Pain. Yes Historical Provider, MD         Allergies:  Bee venom and Blue dyes (parenteral)    PHYSICAL EXAM:      /71   Pulse 69   Temp 97.7 °F (36.5 °C) (Oral)   Resp 16   Ht 5' 2\" (1.575 m)   Wt 148 lb (67.1 kg)   SpO2 98%   BMI 27.07 kg/m²      Heart:  Regular rate and rhythm, No murmur noted    Lungs: No increased work of breathing, good air exchange, clear to ausculation bilaterally     Abdomen:  Soft, non-distended, non-tender, normal active bowel sounds, no masses palpated    ASSESSMENT AND PLAN:    1. Patient seen and focused exam done today- no new changes since last physical exam on 4/9/19    2. Access to ancillary services are available per request of the provider.     LEANA Christopher - CNP     4/15/2019

## 2019-04-15 NOTE — OP NOTE
[facility]  Procedure Note  [address] [place] [facility]     Date of procedure: 4/15/19  Pre-operative Diagnosis:Left Thumb Carpometacarpal Arthritis    Post-operative Diagnosis: same    Procedure:    1. Left Thumb Carpometacarpal Arthroplasty   2. Flexor Carpi Radialis tendon interposition transfer to [facility] joint   3. Intraoperative interpretation 3 views of the Left hand    Surgeon: Sukhwinder Romero     Assistant: [facility]     Anesthesia:  General/Local    Complications:  None immediate apparent  Blood loss: 10ml  Specimens: none  Implants: none      INDICATIONS FOR PROCEDURE: The patient has degenerative arthritis of the [facility] joint of the thumb and has failed appropriate conservative care. The patient understands the relevant risks, benefits, limitations, alternatives, and healing process of the procedure.      The left thumb and hand  was marked in preop holding by Dr Shne Cope discussing the surgical procedure once again with the patient.  All questions and concerns were addressed.  Informed consent was on the chart.  The patient was brought to the operating and room and placed in the supine position.  After the induction of anesthesia a standard time-out was performed.       Description of the Procedure:     We verified that antibiotics had been given. Easton Monsalve left   upper extremity was prepped and draped in normal sterile fashion.  Final timeout was held.  The upper extremity was exsanguinated and the tourniquet was inflated to 250 mmHg.       A standard chevron incision over the [facility] joint of the  thumb dorsally was made.  We then carefully dissected through skin and  subcutaneous tissue protecting underlying neurovascular structures  including cutaneous nerve branches.  Radial artery was also identified  within the wound and was protected throughout the procedure.  We  identified the [facility] joint confirming on fluoroscopy of the trapezium and  basal thumb joint.  After approximately one week  for a wound check.  Possible suture removal more likely in two weeks. Finger range of motion as tolerated except thumb.  The patient will be  referred to occupational therapy for construction of a thumb spica  orthrosis and will begin appropriate motion of the thumb as dictated by  the protocol for [facility] arthroplasty.     ADDENDUM:     It should be noted that three views of the operative thumb were  performed with mini C-arm fluoroscopy for the left thumb.  AP, lateral,  and oblique views demonstrates satisfactory alignment of the  arthroplasty.     MD Micha Quijano

## 2019-04-15 NOTE — H&P
I have reviewed the history and physical and examined the patient and find no relevant changes. I have reviewed with the patient and/or family the risks, benefits, and alternatives to the procedure.     Michelle King MD  4/15/2019

## 2019-04-15 NOTE — PROGRESS NOTES
Dr. Ronald Hoang at bedside- time out, block performed left interscalene for pain control. VS stable. Pain level 3/10 tolerable after block.

## 2019-04-24 ENCOUNTER — OFFICE VISIT (OUTPATIENT)
Dept: ORTHOPEDIC SURGERY | Age: 64
End: 2019-04-24

## 2019-04-24 ENCOUNTER — HOSPITAL ENCOUNTER (OUTPATIENT)
Dept: OCCUPATIONAL THERAPY | Age: 64
Setting detail: THERAPIES SERIES
Discharge: HOME | End: 2019-04-24
Payer: MEDICARE

## 2019-04-24 DIAGNOSIS — M18.12 ARTHRITIS OF CARPOMETACARPAL (CMC) JOINT OF LEFT THUMB: Primary | ICD-10-CM

## 2019-04-24 PROCEDURE — 99024 POSTOP FOLLOW-UP VISIT: CPT | Performed by: ORTHOPAEDIC SURGERY

## 2019-04-24 PROCEDURE — L3808 WHFO, RIGID W/O JOINTS: HCPCS | Performed by: OCCUPATIONAL THERAPIST

## 2019-04-24 NOTE — PLAN OF CARE
[facility] [address]  Phone [phone]      Fax [phone]      Patient: Maile Cain   : 1955  MRN: [id number]  Referring Physician: Referring Practitioner: Toan Hopper    Evaluation Date: 2019      Medical Diagnosis Information:  Diagnosis: L thumb [facility] OA  M18.12     DOS  4/15/19  [facility] arthroplasty           Occupational Therapy Splint Certification Form  Dear Referring Practitioner: Toan Hopper,  The following patient has been evaluated for occupational therapy services for fabrication of a L thumb spica brace. Insurance requires the referring physician to review the treatment plan. Please review the attached evaluation and/or summary of the patient's plan of care, and verify that you agree by signing the attached document and sending it back to our office. Plan of Care/Treatment to date:  [x] Fabrication of custom L FA based thumb spica splint       [x] Instruction on splint use, care and wearing schedule        [x] Follow up as needed for splint modifications          Frequency/Duration:  [] One time visit for splint fabrication and instructions.   Follow up as needed for splint modifications        [x] Splint fabricated, patient to return for full evaluation in 1 month per MD note    Rehab Potential: [x] good [] fair  [] poor         SPLINT EVALUATION  Date: 2019  Name: Maile Cain            : 1955      Medical/Treatment Diagnosis Information:  Diagnosis: L thumb CMC OA  M18.12     DOS  4/15/19  [facility] arthroplasty     Insurance/Certification information:     Physician Information:  Referring Practitioner: Toan Hopper       Next MD Appointment: 1 month    Subjective  History of Injury/ Mechanism of Injury: Pt reports pain in L thumb for several years  Surgery Date:4/15/19  Dominant Hand:    [x] Right []Left  Occupational/Vocational Status:   Progress of any previous OT/PT: the patient []has/ [x]has not received OT/PT previously for this diagnosis. Pain:  3/10    Objective Findings: Stitches removed at MD appointment and steri strips in place. Min- mod edema      Type of splint:   FA based L thumb spica splint  Splint protocol utilization:   At all times except when cleaning skin  Splint Purpose: [x]Immobilize or protect [x]Promote healing of    [x]Relieve pain  []Provide support for improved hand function []Maximize joint motion    Treatment:   [x]Splint provided ([x]Customized/ []Prefabricated), and splint rationale explained. [x]Patient instructed in [x]wear/ [x]care of splint and educated regarding diagnosis. [x]Patient instructed in symptom reduction techniques   []HEP instruction    []Discussed ADL assistive device    Written Information Distributed: []HEP  [x]Splint care and wearing protocol    Patient response to evaluation and instructions:  [x]Attentive/interested   [x]Asked questions/ retained info  []Appeared disinterested  []Poor retention of information  []Appeared anxious/ fearful    Assessment and Plan:  Goals: [x]Patient will be able to verbalize rationale for, and demonstrate proper wearing     of splint. [x]Splint will provide proper fit and function. [x]Patient will be able to verbalize 2-3 ways to prevent further symptoms. [x]Patient will be able to don and doff independently. []Patient will be independent with HEP    Goals met:  [x]yes []no    Plan:  []Splint completed with good fit and function. Hand Therapy to follow up for     splint modifications as needed    [x]Splint completed; OT/PT evaluation initiated.   Patient to return for further treatment in approx 1 month per MD note    Mona Boyce  OTR/L [address] [facility]  OT [id number]

## 2019-04-24 NOTE — PROGRESS NOTES
Chief Complaint:  Post-Op Check (LEFT THUMB)      History of Present of Illness:  Mr. Ada Blanchard is a 68-year-old male presenting as a 1st postoperative visit after left thumb surgery  Procedure: Left thumb CMC arthroplasty with trapezium ectomy and ligament reconstruction tendon interposition FCR tendon  Date of procedure: 4/15/2019  The patient presents 9 days status post surgery  He is doing well, keeping his wound and thumb protected. He did have some initial pain in his thumb and forearm that has since improved and he has now weaned off of oral narcotic medication  No fever chills or other constitutional symptoms and no other complaints today    Examination:  General: Pleasant, well-appearing, no acute distress  Left thumb/hand:  Forearm and thumb incisions are clean with sutures in place, mild ecchymosis with forearm and hand compartment soft and compressible  No erythema fluctuance or drainage  Normal thumb wrist with appropriate tenodesis  Active EPL FPL and thumb abduction  Gross sensation intact radial and ulnar aspect of finger  Capillary refill brisk in fingertip    Radiology:     X-rays obtained and reviewed in office:  Views 3  Location left thumb  Impression status post trapezi ectomy with no new fracture or additional osseous abnormalities, no evidence of subsidence    Orders Placed This Encounter   Procedures    XR FINGER LEFT (MIN 2 VIEWS)    [facility] - Jayy Barbosa Occupational Therapy     Referral Priority:   Routine     Referral Type:   Eval and Treat     Referral Reason:   Specialty Services Required     Requested Specialty:   Occupational Therapy     Number of Visits Requested:   1       Impression:  Encounter Diagnosis   Name Primary?  Arthritis of carpometacarpal (CMC) joint of left thumb Yes         Treatment Plan:  The patient demonstrates appropriate radiographic alignment and clinical position of his thumb.  We will plan to remove sutures today and apply Steri-Strips, okay to shower in 24

## 2019-05-22 ENCOUNTER — OFFICE VISIT (OUTPATIENT)
Dept: ORTHOPEDIC SURGERY | Age: 64
End: 2019-05-22

## 2019-05-22 DIAGNOSIS — M18.12 ARTHRITIS OF CARPOMETACARPAL (CMC) JOINT OF LEFT THUMB: Primary | ICD-10-CM

## 2019-05-22 PROCEDURE — 99024 POSTOP FOLLOW-UP VISIT: CPT | Performed by: ORTHOPAEDIC SURGERY

## 2019-05-22 NOTE — PROGRESS NOTES
Chief Complaint:  Follow-up (left cmc arthroplasty)      History of Present of Illness:  Mr. Chase Cody is a 65-year-old male presenting as a repeat scheduled postoperative visit after left thumb surgery  Procedure: Left thumb CMC arthroplasty with trapezium ectomy and ligament reconstruction tendon interposition FCR tendon  Date of procedure: 4/15/2019  He presents 5 weeks status post surgery today. He reports to be improving with regards to his pain and swelling. He has had some irritation from the splint which she has been wearing as instructed. No other new swelling, injury or other symptoms described today        Examination:  General: Pleasant, well-appearing, no acute distress  Left thumb/hand:  Forearm and thumb incisions are well healed with a soft and mobile scar, no evidence of swelling throughout forearm, minimal swelling at base of thumb  No erythema fluctuance or drainage  Normal thumb and wrist tenodesis,  Active EPL FPL and thumb abduction with gentle testing today with no mechanical symptoms and no increased pain  Full composite fist and full extension of all fingers without pain  Gross sensation intact radial and ulnar aspect of finger  Capillary refill brisk in fingertip        Radiology:     X-rays obtained and reviewed in office:  Views 3  Location left hand  Impression no change in alignment status post trapezi ectomy with maintained position of thumb metacarpal, no additional acute osseous abnormality    Orders Placed This Encounter   Procedures    XR FINGER LEFT (MIN 2 VIEWS)       Impression:  Encounter Diagnosis   Name Primary?  Arthritis of carpometacarpal (CMC) joint of left thumb Yes         Treatment Plan:  The patient appears to be healing appropriately now just over 5 weeks status post surgery. We will plan to continue with protocol status post [facility] arthroplasty with beginning therapy for gentle thumb range of motion starting next week and brace wean.   He is to continue to wear the

## 2019-07-03 ENCOUNTER — OFFICE VISIT (OUTPATIENT)
Dept: ORTHOPEDIC SURGERY | Age: 64
End: 2019-07-03

## 2019-07-03 DIAGNOSIS — M18.12 ARTHRITIS OF CARPOMETACARPAL (CMC) JOINT OF LEFT THUMB: Primary | ICD-10-CM

## 2019-07-03 PROCEDURE — 99024 POSTOP FOLLOW-UP VISIT: CPT | Performed by: ORTHOPAEDIC SURGERY

## 2019-07-03 NOTE — PROGRESS NOTES
Arthritis of carpometacarpal (CMC) joint of left thumb Yes         Treatment Plan:  The patient is now nearly 2-1/2 months status post surgery. He appears to be progressing with regards to his range of motion and  as expected. Continue with progressive pinching  guided with occupational therapy. Okay to continue to wear the brace especially with heavier activities. Plan to progress into daily functions over the next 2 to 3 weeks with no limitations at that point.   I will plan to follow-up with him in 3 months for repeat evaluation and sooner if necessary

## 2019-07-22 ENCOUNTER — TELEPHONE (OUTPATIENT)
Dept: ORTHOPEDIC SURGERY | Age: 64
End: 2019-07-22

## 2019-07-24 ENCOUNTER — OFFICE VISIT (OUTPATIENT)
Dept: ORTHOPEDIC SURGERY | Age: 64
End: 2019-07-24
Payer: MEDICARE

## 2019-07-24 DIAGNOSIS — M18.12 ARTHRITIS OF CARPOMETACARPAL (CMC) JOINT OF LEFT THUMB: Primary | ICD-10-CM

## 2019-07-24 PROCEDURE — 99213 OFFICE O/P EST LOW 20 MIN: CPT | Performed by: ORTHOPAEDIC SURGERY

## 2019-07-24 PROCEDURE — G8427 DOCREV CUR MEDS BY ELIG CLIN: HCPCS | Performed by: ORTHOPAEDIC SURGERY

## 2019-07-24 PROCEDURE — 1123F ACP DISCUSS/DSCN MKR DOCD: CPT | Performed by: ORTHOPAEDIC SURGERY

## 2019-07-24 PROCEDURE — 1036F TOBACCO NON-USER: CPT | Performed by: ORTHOPAEDIC SURGERY

## 2019-07-24 PROCEDURE — 3017F COLORECTAL CA SCREEN DOC REV: CPT | Performed by: ORTHOPAEDIC SURGERY

## 2019-07-24 PROCEDURE — 4040F PNEUMOC VAC/ADMIN/RCVD: CPT | Performed by: ORTHOPAEDIC SURGERY

## 2019-07-24 PROCEDURE — G8419 CALC BMI OUT NRM PARAM NOF/U: HCPCS | Performed by: ORTHOPAEDIC SURGERY

## 2019-09-17 ENCOUNTER — OFFICE VISIT (OUTPATIENT)
Dept: ORTHOPEDIC SURGERY | Age: 64
End: 2019-09-17
Payer: MEDICARE

## 2019-09-17 DIAGNOSIS — M18.12 ARTHRITIS OF CARPOMETACARPAL (CMC) JOINT OF LEFT THUMB: Primary | ICD-10-CM

## 2019-09-17 PROCEDURE — 1036F TOBACCO NON-USER: CPT | Performed by: ORTHOPAEDIC SURGERY

## 2019-09-17 PROCEDURE — G8427 DOCREV CUR MEDS BY ELIG CLIN: HCPCS | Performed by: ORTHOPAEDIC SURGERY

## 2019-09-17 PROCEDURE — 99213 OFFICE O/P EST LOW 20 MIN: CPT | Performed by: ORTHOPAEDIC SURGERY

## 2019-09-17 PROCEDURE — 3017F COLORECTAL CA SCREEN DOC REV: CPT | Performed by: ORTHOPAEDIC SURGERY

## 2019-09-17 PROCEDURE — 4040F PNEUMOC VAC/ADMIN/RCVD: CPT | Performed by: ORTHOPAEDIC SURGERY

## 2019-09-17 PROCEDURE — G8419 CALC BMI OUT NRM PARAM NOF/U: HCPCS | Performed by: ORTHOPAEDIC SURGERY

## 2019-09-17 PROCEDURE — 1123F ACP DISCUSS/DSCN MKR DOCD: CPT | Performed by: ORTHOPAEDIC SURGERY

## 2019-10-14 ENCOUNTER — OFFICE VISIT (OUTPATIENT)
Dept: ORTHOPEDIC SURGERY | Age: 64
End: 2019-10-14
Payer: MEDICARE

## 2019-10-14 DIAGNOSIS — M18.12 ARTHRITIS OF CARPOMETACARPAL (CMC) JOINT OF LEFT THUMB: Primary | ICD-10-CM

## 2019-10-14 PROCEDURE — 4040F PNEUMOC VAC/ADMIN/RCVD: CPT | Performed by: ORTHOPAEDIC SURGERY

## 2019-10-14 PROCEDURE — 99213 OFFICE O/P EST LOW 20 MIN: CPT | Performed by: ORTHOPAEDIC SURGERY

## 2019-10-14 PROCEDURE — G8419 CALC BMI OUT NRM PARAM NOF/U: HCPCS | Performed by: ORTHOPAEDIC SURGERY

## 2019-10-14 PROCEDURE — G8484 FLU IMMUNIZE NO ADMIN: HCPCS | Performed by: ORTHOPAEDIC SURGERY

## 2019-10-14 PROCEDURE — 1123F ACP DISCUSS/DSCN MKR DOCD: CPT | Performed by: ORTHOPAEDIC SURGERY

## 2019-10-14 PROCEDURE — 3017F COLORECTAL CA SCREEN DOC REV: CPT | Performed by: ORTHOPAEDIC SURGERY

## 2019-10-14 PROCEDURE — 1036F TOBACCO NON-USER: CPT | Performed by: ORTHOPAEDIC SURGERY

## 2019-10-14 PROCEDURE — G8427 DOCREV CUR MEDS BY ELIG CLIN: HCPCS | Performed by: ORTHOPAEDIC SURGERY

## 2020-05-13 ENCOUNTER — OFFICE VISIT (OUTPATIENT)
Dept: ORTHOPEDIC SURGERY | Age: 65
End: 2020-05-13
Payer: MEDICARE

## 2020-05-13 VITALS — WEIGHT: 147.93 LBS | BODY MASS INDEX: 27.22 KG/M2 | HEIGHT: 62 IN

## 2020-05-13 PROCEDURE — 4040F PNEUMOC VAC/ADMIN/RCVD: CPT | Performed by: ORTHOPAEDIC SURGERY

## 2020-05-13 PROCEDURE — G8427 DOCREV CUR MEDS BY ELIG CLIN: HCPCS | Performed by: ORTHOPAEDIC SURGERY

## 2020-05-13 PROCEDURE — 1036F TOBACCO NON-USER: CPT | Performed by: ORTHOPAEDIC SURGERY

## 2020-05-13 PROCEDURE — 99213 OFFICE O/P EST LOW 20 MIN: CPT | Performed by: ORTHOPAEDIC SURGERY

## 2020-05-13 PROCEDURE — MISC220: Performed by: ORTHOPAEDIC SURGERY

## 2020-05-13 PROCEDURE — 20605 DRAIN/INJ JOINT/BURSA W/O US: CPT | Performed by: ORTHOPAEDIC SURGERY

## 2020-05-13 PROCEDURE — 3017F COLORECTAL CA SCREEN DOC REV: CPT | Performed by: ORTHOPAEDIC SURGERY

## 2020-05-13 PROCEDURE — 1123F ACP DISCUSS/DSCN MKR DOCD: CPT | Performed by: ORTHOPAEDIC SURGERY

## 2020-05-13 PROCEDURE — G8419 CALC BMI OUT NRM PARAM NOF/U: HCPCS | Performed by: ORTHOPAEDIC SURGERY

## 2020-05-13 RX ORDER — TRIAMCINOLONE ACETONIDE 40 MG/ML
40 INJECTION, SUSPENSION INTRA-ARTICULAR; INTRAMUSCULAR ONCE
Status: COMPLETED | OUTPATIENT
Start: 2020-05-13 | End: 2020-05-13

## 2020-05-13 RX ADMIN — TRIAMCINOLONE ACETONIDE 40 MG: 40 INJECTION, SUSPENSION INTRA-ARTICULAR; INTRAMUSCULAR at 11:21

## 2020-05-13 NOTE — PROGRESS NOTES
Injection administration details:  Date & Time: 5/13/20 11:22 AM  Site & Comments: Left Wrist administered by Dr Nannette Aguilar 1%   CC# : 1.0  NDC: 7142-9220-04  Lot number: [id number]  EXP: 08/2021

## 2020-08-04 ENCOUNTER — OFFICE VISIT (OUTPATIENT)
Dept: ORTHOPEDIC SURGERY | Age: 65
End: 2020-08-04
Payer: MEDICARE

## 2020-08-04 VITALS — BODY MASS INDEX: 27.05 KG/M2 | HEIGHT: 62 IN | WEIGHT: 147 LBS

## 2020-08-04 PROCEDURE — G8417 CALC BMI ABV UP PARAM F/U: HCPCS | Performed by: ORTHOPAEDIC SURGERY

## 2020-08-04 PROCEDURE — 1123F ACP DISCUSS/DSCN MKR DOCD: CPT | Performed by: ORTHOPAEDIC SURGERY

## 2020-08-04 PROCEDURE — 1036F TOBACCO NON-USER: CPT | Performed by: ORTHOPAEDIC SURGERY

## 2020-08-04 PROCEDURE — 4040F PNEUMOC VAC/ADMIN/RCVD: CPT | Performed by: ORTHOPAEDIC SURGERY

## 2020-08-04 PROCEDURE — G8427 DOCREV CUR MEDS BY ELIG CLIN: HCPCS | Performed by: ORTHOPAEDIC SURGERY

## 2020-08-04 PROCEDURE — 99213 OFFICE O/P EST LOW 20 MIN: CPT | Performed by: ORTHOPAEDIC SURGERY

## 2020-08-04 PROCEDURE — 3017F COLORECTAL CA SCREEN DOC REV: CPT | Performed by: ORTHOPAEDIC SURGERY

## 2020-08-04 NOTE — PROGRESS NOTES
Assessment:  79-year-old male presenting status post left thumb CMC arthroplasty 4/15/2019 with concomitant SLAC wrist    Treatment Plan: I discussed with the patient today his current symptoms. After his recent fall I do not appreciate any signs of acute injury based on his x-rays and it seems that his forearm pain has been progressively improving. We discussed today also his symptoms of continued wrist pain. He did receive some improvement after corticosteroid injection but has been having intermittent symptoms and treating symptomatically with bracing and anti-inflammatory medication. Ultimately I think that most of his symptoms are caused by his persistent arthritis in his wrist.  We discussed options including the possibility of salvage surgical procedure in the future but the patient would prefer to consider and continue conservative management for now  I do not think that his pain is being caused by his surgery or metacarpal base at this point based on his examination  X-rays do show some subsidence but unlikely to be contributing to most of his symptoms    He also inquires today specifically about the possibility of a inflammatory overall source of his symptoms and arthritis as he has had multiple joint pain including his spine here more recently. I have offered him a referral to rheumatology for further evaluation and discussion of further work-up regarding his constellation of symptoms. No follow-ups on file. History of Present Illness  Zane Torres is a 76 y.o. male here today for mainly left wrist and forearm pain. He mainly presents today as he sustained a fall about 10 days ago while sleeping in a chair and landed onto his left forearm. He initially had more pain in his distal forearm and near the radial aspect of his wrist that has since progressively improved. He has been using a wrist wrap and also using topical anti-inflammatory which has provided some benefit.   He has over the planes with nearly symmetrical range of motion compared with contralateral thumb    Full composite fist and full extension of all fingers without pain  Gross sensation intact radial and ulnar aspect of finger  Capillary refill brisk in fingertip  Forearm compartments and hand compartments soft and compressible    Capillary refill brisk all fingers, symmetric  Gross sensation intact to light touch median/ulnar/radial nerves  Sensation intact to radial/ulnar aspect of fingertip        Radiology:    X-rays obtained and reviewed in office:  Views 3  Location left wrist  Impression no acute fracture dislocation including forearm and wrist  Unchanged degenerative changes and SLAC wrist changes in radiocarpal joint. Slight subsidence of metacarpal base compared with prior images but space continues to be maintained with no new degenerative changes      Additional Diagnostic Test Findings:    Office Procedures:  Orders Placed This Encounter   Procedures    XR WRIST LEFT (MIN 3 VIEWS)     Standing Status:   Future     Number of Occurrences:   1     Standing Expiration Date:   8/4/2021   [facility] Sammy Baker MD, Rhematology, [facility]     Referral Priority:   Routine     Referral Type:   Eval and Treat     Referral Reason:   Specialty Services Required     Referred to Provider:   Martha Salinas MD     Requested Specialty:   Rheumatology     Number of Visits Requested:   385 Jessenia Theodore MD  Orthopaedic Surgeon, [address]    Contact Information:  Elaine Brice: [phone] Clinical )    This dictation was performed with a verbal recognition program [facility]) and it was checked for errors. It is possible that there are still dictated errors within this office note. If so, please bring any errors to my attention for an addendum. All efforts were made to ensure that this office note is accurate.

## 2020-08-17 NOTE — PROGRESS NOTES
Dariusz Briceño MD  [facility]) Physicians - Rheumatology    [x] [facility] HOSPITAL:  [address], [address] [facility] [] [place] Office:  [address], [address]   Office: [phone]  Fax: [phone]     RHEUMATOLOGY CONSULT NOTE    HISTORY OF PRESENT ILLNESS:    The pt is a 76 y.o. male referred by Jose Alicea MD for L wrist pain. Current rheum meds:  Meloxicam 15 mg daily  Voltaren gel PRN    Prior rheum meds:  Celebrex    Pt has a prior Hx of generalized OA. He is s/p L CMC arthrolasty on 4/15/19 w/ concomitant SLAC wrist and states he's previously undergone cervical fusion and lumbar ablation which did improve his back pain. He is wondering if there is something he could do to slow down the progression of his arthritis. He reports intermittent pain in his L thumb radiating up to his L forearm region which was brought on after falling off a chair and landing on his left forearm in 7/20. Pain is brought on by repetitive use of his wrist joint such as typing. Pt states he is hesitant to receive steroid injection d/t his pre-diabetes, he states his prior MARVIN in the past reportedly significantly increased his HgbA1c. Pt has also been offered salvage surgical procedure by Dr. Ирина Bravo but he is hesitant to pursue this as he does not want to lose his wrist ROM. He feels his R thumb is starting to hurt occasionally otherwise denies any other hand joint or R wrist pain. Denies joint swelling or morning stiffness. Pt states his LBP significantly improved after receiving nerve ablation and describes this as \"more of a discomfort\" brought on by sitting. LBP eases up w/ walking, denies morning stiffness. He reports good pain relief from Meloxicam, denies worsening of his GERD on this. He also remains on Voltaren gel. Denies known FHx of autoimmune disease. He is a never smoker. He previously worked in retail, currently works for [employer].   Pt states he stays physically active by walking his 2 dogs, walks at least 1 mi/day.     REVIEW OF SYSTEMS:    Constitutional: denies chronic fatigue, fever/chills, night sweats, unintentional weight loss  Integumentary: denies photosensitivity, rash, patchy alopecia, or Sx of Raynaud's phenomenon  Eyes: denies dry eyes, redness or pain, visual disturbance, or floaters  Ears: denies hearing loss, tinnitus, vertigo, or recurrent ear infections  Nose: denies nasal ulcers or recurrent sinusitis  Oral cavity: denies dry mouth or oral ulcers  Cardiovascular: denies CP, palpitations, Hx of pericardial effusion or pericarditis  Respiratory: denies SOB, cough, hemoptysis, or pleurisy  Gastrointestinal: denies heart burn, dysphagia or esophageal dysmotility, denies change in bowel habits or Sx of IBD  Hematologic/Lymphatic: denies abnormal bruising or bleeding, denies Hx of blood clots, denies swollen LNs  Musculoskeletal:  refer to above HPI   Neurological: denies focal weakness, paresthesias/hyperesthesias or change in sensation, denies Hx of seizure, denies change in gait, balance, or memory  Psychiatric: denies Hx of depression or anxiety    Past Medical History:   Diagnosis Date    Anesthesia     F/H  of psuedocholenesterase  intolerance    Arthritis     Hyperlipidemia     Hypertension         Past Surgical History:   Procedure Laterality Date    CERVICAL FUSION      FINGER TRIGGER RELEASE      several    HAND TENDON SURGERY Left 4/15/2019    LEFT THUMB CARPOMETACARPAL ARTHROPALSTY INCLUDING TRAPEZIECTOMY, LIGAMENT RECONSTRUCTION AND TENDON INTERPOSITION WITH FLEXOR CARPI RADIALIS TENDON performed by Felipe Cotton MD at [address] [place] [address]        Social History     Socioeconomic History    Marital status:      Spouse name: Not on file    Number of children: Not on file    Years of education: Not on file    Highest education level: Not on file   Occupational History    Not on file   Social Needs    Financial resource strain: Not on file    Food insecurity     Worry: Not on file     Inability: Not on file    Transportation needs     Medical: Not on file     Non-medical: Not on file   Tobacco Use    Smoking status: Never Smoker    Smokeless tobacco: Never Used   Substance and Sexual Activity    Alcohol use: Yes     Comment: 1 to 2 drinks/week    Drug use: Never    Sexual activity: Not on file   Lifestyle    Physical activity     Days per week: Not on file     Minutes per session: Not on file    Stress: Not on file   Relationships    Social connections     Talks on phone: Not on file     Gets together: Not on file     Attends Confucianism service: Not on file     Active member of club or organization: Not on file     Attends meetings of clubs or organizations: Not on file     Relationship status: Not on file    Intimate partner violence     Fear of current or ex partner: Not on file     Emotionally abused: Not on file     Physically abused: Not on file     Forced sexual activity: Not on file   Other Topics Concern    Not on file   Social History Narrative    Not on file        No family history on file. MEDICATIONS:    Current Outpatient Medications:     methocarbamol (ROBAXIN) 500 MG tablet, Take 500 mg by mouth daily, Disp: , Rfl:     acetaminophen (TYLENOL) 500 MG tablet, Take 1,000 mg by mouth as needed for Pain (pt rarely takes), Disp: , Rfl:     diclofenac sodium 1 % GEL, Apply 2 g topically 2 times daily, Disp: , Rfl:     SUMAtriptan (IMITREX) 100 MG tablet, Take 100 mg by mouth once as needed for Migraine, Disp: , Rfl:     amLODIPine-benazepril (LOTREL) 5-10 MG per capsule, Take 1 capsule by mouth daily. , Disp: , Rfl:     fexofenadine (ALLEGRA) 180 MG tablet, Take 180 mg by mouth nightly , Disp: , Rfl:     atorvastatin (LIPITOR) 10 MG tablet, Take 10 mg by mouth nightly , Disp: , Rfl:     BUSPIRONE HCL PO, Take 10 mg by mouth 2 times daily , Disp: , Rfl:     fluticasone (FLONASE) 50 MCG/ACT nasal spray, 2 sprays by Nasal route nightly , Disp: , Rfl:     ALPRAZolam (XANAX) 1 MG tablet, Take 1 mg by mouth daily. , Disp: , Rfl:     Omeprazole (PRILOSEC PO), Take 20 mg by mouth daily , Disp: , Rfl:     meloxicam (MOBIC) 15 MG tablet, Take 15 mg by mouth daily. , Disp: , Rfl:     Coenzyme Q10 (COQ-10) 200 MG CAPS, Take 1 capsule by mouth daily, Disp: , Rfl:     traMADol (ULTRAM) 50 MG tablet, Take 50 mg by mouth 2 times daily. , Disp: , Rfl:     Cinnamon 500 MG CAPS, Take 2 capsules by mouth daily, Disp: , Rfl:     ondansetron (ZOFRAN) 4 MG tablet, Take 1 tablet by mouth every 8 hours as needed for Nausea, Disp: 20 tablet, Rfl: 0    Cholecalciferol (VITAMIN D) 2000 units CAPS capsule, Take 1 capsule by mouth daily , Disp: , Rfl:     Chromium 1000 MCG TABS, Take 2 tablets by mouth daily , Disp: , Rfl:     ALLERGIES:  Bee venom;  Anectine [succinylcholine]; and Blue dyes (parenteral)    PHYSICAL EXAM:    Vitals:    Temp 97.5 °F (36.4 °C)   Ht 5' 2\" (1.575 m)   Wt 147 lb (66.7 kg)   BMI 26.89 kg/m²     GEN: AAOx3, in NAD, well-appearing  HEAD: normocephalic, atraumatic  EYES: EOMI, PERRLA, no injection or icterus  ORAL CAVITY: moist oral mucosa w/ good saliva pooling, no oral lesions, no evidence of thrush, no evidence of parotid gland enlargement  CVS: RRR  LUNGS: in no acute respiratory distress, CTAB  ABDOMEN: +BS, soft, NT/ND  MSK:  Spine: very mild thoracic kyphosis, slight limitation to C-spine ROM, no point tenderness over the spine or paraspinal muscles, SI joints NTTP  Upper extremities:   Hands: no synovitis in the MCP, PIP, or DIP joints b/l NTTP, s/p L CMC arthroplasty w/ mild thenar atrophy, +Finkelstein's test on L side, R CMC joint w/o swelling NTTP   Wrist: no synovitis in the wrist joints b/l, FROM   Elbow: no synovitis or bursitis, FROM  Lower extremities:   Knees: no warmth or effusion present, FROM   Ankles: no synovitis, FROM, Achilles tendons w/o swelling or warmth NTTP   Feet: degenerative changes and SLAC wrist changes in radiocarpal joint. Slight subsidence of metacarpal base compared with prior images but space continues to be maintained with no new degenerative changes    I independently reviewed above x-ray, there is OA changes in the PIP joints w/ SLAC wrist changes in the radiocarpal joint, there also appears to be mild juxta-articular osteopenia, otherwise no chondrocalcinosis or erosive changes. Above results were discussed w/ the pt in detail during today's visit. ASSESSMENT/PLAN:  76 y.o. male w/ generalized OA s/p L CMC arthrolasty on 4/15/19 w/ concomitant SLAC wrist, s/p ACDF at C5-6 and C6-7 and lumbar ablation for DDD. PMHx pertinent for HTN, HLD, GERD, NICKOLAS. Discussed the disease course of De Quervain's tenosynovitis, pt declined steroid injection. Provided stretching exercises, he remains on Meloxicam.  Start trial of compound pain cream.  Also recommended paraffin wax bath. Suspect he like mild OA in his hands however given findings of SLAC wrist changes and mild juxta-articular osteopenia seen on recent L wrist X-ray, will obtain X-ray of b/l hands to r/o inflammatory arthritis. Discussed the disease course of generalized OA and DDD of L-spine. Discussed Tx options including Gabapentin in addition to NSAID PRN however pt declined for now. Made referral to PT for his DDD of L-spine.     Orders Placed This Encounter   Procedures    XR HAND LEFT (MIN 3 VIEWS)     Standing Status:   Future     Standing Expiration Date:   2/19/2021     Order Specific Question:   Reason for exam:     Answer:   evaluate for degenerative vs inflammatory arthritis    XR HAND RIGHT (MIN 3 VIEWS)     Standing Status:   Future     Standing Expiration Date:   2/19/2021     Order Specific Question:   Reason for exam:     Answer:   evaluate for degenerative vs. inflammatory arthritis    C-Reactive Protein     Standing Status:   Future     Standing Expiration Date:   2/19/2021   Dwayne Jean Baptiste Rheumatoid Factor     Standing Status:   Future     Standing Expiration Date:   5/03/8394    Cyclic Citrul Peptide Antibody, IgG     Standing Status:   Future     Standing Expiration Date:   2/19/2021   [address]     Referral Priority:   Routine     Referral Type:   Eval and Treat     Referral Reason:   Specialty Services Required     Requested Specialty:   Physical Therapy     Number of Visits Requested:   1     Outpatient Encounter Medications as of 8/19/2020   Medication Sig Dispense Refill    methocarbamol (ROBAXIN) 500 MG tablet Take 500 mg by mouth daily      acetaminophen (TYLENOL) 500 MG tablet Take 1,000 mg by mouth as needed for Pain (pt rarely takes)      diclofenac sodium 1 % GEL Apply 2 g topically 2 times daily      SUMAtriptan (IMITREX) 100 MG tablet Take 100 mg by mouth once as needed for Migraine      amLODIPine-benazepril (LOTREL) 5-10 MG per capsule Take 1 capsule by mouth daily.  fexofenadine (ALLEGRA) 180 MG tablet Take 180 mg by mouth nightly       atorvastatin (LIPITOR) 10 MG tablet Take 10 mg by mouth nightly       BUSPIRONE HCL PO Take 10 mg by mouth 2 times daily       fluticasone (FLONASE) 50 MCG/ACT nasal spray 2 sprays by Nasal route nightly       ALPRAZolam (XANAX) 1 MG tablet Take 1 mg by mouth daily.  Omeprazole (PRILOSEC PO) Take 20 mg by mouth daily       meloxicam (MOBIC) 15 MG tablet Take 15 mg by mouth daily.  Coenzyme Q10 (COQ-10) 200 MG CAPS Take 1 capsule by mouth daily      traMADol (ULTRAM) 50 MG tablet Take 50 mg by mouth 2 times daily.       Cinnamon 500 MG CAPS Take 2 capsules by mouth daily      ondansetron (ZOFRAN) 4 MG tablet Take 1 tablet by mouth every 8 hours as needed for Nausea 20 tablet 0    Cholecalciferol (VITAMIN D) 2000 units CAPS capsule Take 1 capsule by mouth daily       Chromium 1000 MCG TABS Take 2 tablets by mouth daily        No facility-administered encounter medications on file as of

## 2020-08-19 ENCOUNTER — HOSPITAL ENCOUNTER (OUTPATIENT)
Dept: GENERAL RADIOLOGY | Age: 65
Discharge: HOME | End: 2020-08-19
Payer: MEDICARE

## 2020-08-19 ENCOUNTER — OFFICE VISIT (OUTPATIENT)
Dept: RHEUMATOLOGY | Age: 65
End: 2020-08-19
Payer: MEDICARE

## 2020-08-19 ENCOUNTER — HOSPITAL ENCOUNTER (OUTPATIENT)
Age: 65
Discharge: HOME | End: 2020-08-19
Payer: MEDICARE

## 2020-08-19 VITALS — BODY MASS INDEX: 27.05 KG/M2 | TEMPERATURE: 97.5 F | HEIGHT: 62 IN | WEIGHT: 147 LBS

## 2020-08-19 DIAGNOSIS — M15.9 GENERALIZED OSTEOARTHRITIS OF MULTIPLE SITES: ICD-10-CM

## 2020-08-19 LAB
C-REACTIVE PROTEIN: 1.3 MG/L (ref 0–5.1)
RHEUMATOID FACTOR: <10 IU/ML

## 2020-08-19 PROCEDURE — 3017F COLORECTAL CA SCREEN DOC REV: CPT | Performed by: INTERNAL MEDICINE

## 2020-08-19 PROCEDURE — 4040F PNEUMOC VAC/ADMIN/RCVD: CPT | Performed by: INTERNAL MEDICINE

## 2020-08-19 PROCEDURE — 1123F ACP DISCUSS/DSCN MKR DOCD: CPT | Performed by: INTERNAL MEDICINE

## 2020-08-19 PROCEDURE — 73130 X-RAY EXAM OF HAND: CPT

## 2020-08-19 PROCEDURE — 99204 OFFICE O/P NEW MOD 45 MIN: CPT | Performed by: INTERNAL MEDICINE

## 2020-08-19 PROCEDURE — G8417 CALC BMI ABV UP PARAM F/U: HCPCS | Performed by: INTERNAL MEDICINE

## 2020-08-19 PROCEDURE — G8427 DOCREV CUR MEDS BY ELIG CLIN: HCPCS | Performed by: INTERNAL MEDICINE

## 2020-08-19 PROCEDURE — 1036F TOBACCO NON-USER: CPT | Performed by: INTERNAL MEDICINE

## 2020-08-19 RX ORDER — METHOCARBAMOL 500 MG/1
500 TABLET, FILM COATED ORAL DAILY
COMMUNITY

## 2020-08-19 NOTE — RESULT ENCOUNTER NOTE
Please let pt know that his x-rays showed mild osteoarthritis in his hands, this is consistent with what we discussed earlier today, I do not see any signs of inflammatory arthritis.   We will contact him if his blood work is abnormal.

## 2020-08-19 NOTE — PATIENT INSTRUCTIONS
Cosme Avila Tenosynovitis: Care Instructions  Your Care Instructions  Cosme Avila (say \"Dannielle\") tenosynovitis is a problem that makes the bottom of your thumb and the side of your wrist hurt. When you have de Quervain's, the ropey fiber (tendon) that helps move your thumb away from your fingers becomes swollen. You may have pain when you move your wrist or pick things up. You may hear a creaking sound when you move your wrist or thumb. Symptoms often get better in a few weeks with home care. Your doctor may want you to start some gentle stretching exercises once your symptoms are gone. Sometimes treatment with an injection or surgery is needed. Follow-up care is a key part of your treatment and safety. Be sure to make and go to all appointments, and call your doctor if you are having problems. It's also a good idea to know your test results and keep a list of the medicines you take. How can you care for yourself at home? · Until your symptoms are better, stop the activities that caused the pain. · Avoid moving the hand and wrist that hurt. · Follow your doctor's directions for wearing a splint to keep your thumb and wrist from moving. · Try ice or heat. ? Put ice or a cold pack on your thumb and wrist for 10 to 20 minutes at a time. Put a thin cloth between the ice and your skin. ? You can use heat for 20 to 30 minutes, 2 or 3 times a day. Try using a heating pad, hot shower, or hot pack. · Ask your doctor if you can take an over-the-counter pain medicine, such as acetaminophen (Tylenol), ibuprofen (Advil, Motrin), or naproxen (Aleve). Be safe with medicines. Read and follow all instructions on the label. When should you call for help? Watch closely for changes in your health, and be sure to contact your doctor if:  · You have new or worse pain. · You have new or worse numbness or tingling in your hand or fingers. · Your hand feels weaker. · You do not get better as expected.   Where can you learn more? Go to https://chpepiceweb.[employer] [employer]. org and sign in to your [employer] account. Enter O352 in the [employer] box to learn more about \"De Quervain's Tenosynovitis: Care Instructions. \"     If you do not have an account, please click on the \"Sign Up Now\" link. Current as of: March 2, 2020               Content Version: 12.5  © 2006-2020 [employer]. Care instructions adapted under license by [facility] ([facility]). If you have questions about a medical condition or this instruction, always ask your healthcare professional. [address] any warranty or liability for your use of this information. Antionette Reap Disease: Exercises  Introduction  Here are some examples of exercises for you to try. The exercises may be suggested for a condition or for rehabilitation. Start each exercise slowly. Ease off the exercises if you start to have pain. You will be told when to start these exercises and which ones will work best for you. How to do the exercises  Thumb lifts   1. Place your hand on a flat surface, with your palm up. 2. Lift your thumb away from your palm to make a \"C\" shape. 3. Hold for about 6 seconds. 4. Repeat 8 to 12 times. Passive thumb MP flexion   1. Hold your hand in front of you, and turn your hand so your little finger faces down and your thumb faces up. (Your hand should be in the position used for shaking someone's hand.) You may also rest your hand on a flat surface. 2. Use the fingers on your other hand to bend your thumb down at the point where your thumb connects to your palm. 3. Hold for at least 15 to 30 seconds. 4. Repeat 2 to 4 times. Finkelstein stretch   1. Hold your arms out in front of you. (Your hand should be in the position used for shaking someone's hand.)  2. Bend your thumb toward your palm.   3. Use your other hand to gently stretch your thumb and wrist downward until you feel the stretch on the thumb side of your wrist.  4. Hold for at least 15 to 30 seconds. 5. Repeat 2 to 4 times. Resisted ulnar deviation   For this exercise, you will need elastic exercise material, such as surgical tubing or Thera-Band. 1. Sit leaning forward with your legs slightly spread and your elbow on your thigh. 2. Grasp one end of the band with your palm down, and step on the other end with the foot opposite the hand holding the band. 3. Slowly bend your wrist sideways and away from your knee. 4. Repeat 8 to 12 times. Follow-up care is a key part of your treatment and safety. Be sure to make and go to all appointments, and call your doctor if you are having problems. It's also a good idea to know your test results and keep a list of the medicines you take. Where can you learn more? Go to [email or web address] and sign in to your [email or web address] account. Enter Z941 in the [employer] box to learn more about \"De Quervain's Disease: Exercises. \"     If you do not have an account, please click on the \"Sign Up Now\" link. Current as of: March 2, 2020               Content Version: 12.5  © 4386-4105 Healthwise, Incorporated. Care instructions adapted under license by [facility] ([facility]). If you have questions about a medical condition or this instruction, always ask your healthcare professional. [place] [address] any warranty or liability for your use of this information. OSTEOARTHRITIS:                Osteoarthritis is a joint disease that most often affects middle-age to elderly people. It is commonly referred to as OA or as \"wear and tear\" of the joints, but we now know that OA is a disease of the entire joint, involving the cartilage, joint lining, ligaments, and bone. Although it is more common in older people, it is not really accurate to say that the joints are just \"wearing out. \"  About 27 million Americans are living with OA, the most common form of joint disease.  The lifetime risk of developing OA of the knee is about 46%, and the lifetime risk of developing OA of the hip is 25%, according to the [facility], a long-term study from the [address] and sponsored by [employer] for [employer] and [employer] (often called the [employer]) and the [employer]. OA is a top cause of disability in older people. The goal of treatment in OA is to reduce pain and improve function. There is no cure for the disease, but some treatments attempt to slow disease progression. Fast facts  OA is the most common form of joint disease, and is a leading cause of disability in elderly people. This arthritis tends to occur in the hand joints, spine, hips, knees, and great toes. It is characterized by breakdown of the cartilage (the tissue that cushions the ends of the bones between joints), bony changes of the joints, deterioration of tendons and ligaments, and various degrees of inflammation of the synovium (joint lining). Though some of the joint changes are irreversible, most patients will not need joint replacement surgery. OA symptoms (what you feel) can vary greatly among patients. A rheumatologist can detect arthritis and prescribe the proper treatment. In osteoarthritis, the cartilage between the bones in the joint breaks down (left image). Slowly, affected bones get bigger, as in the hand at right. What is osteoarthritis? OA is a frequently slowly progressive joint disease typically seen in middle-aged to elderly people. The disease occurs when the joint cartilage breaks down often because of mechanical stress or biochemical alterations, causing the bone underneath to fail. OA can occur together with other types of arthritis, such as gout or rheumatoid arthritis. OA tends to affect commonly used joints such as the hands and spine, and the weight-bearing joints such as the hips and knees.    Symptoms include:   Joint pain and stiffness   Knobby swelling at the joint   Cracking or grinding noise with joint movement   Decreased function of the joint    Who gets osteoarthritis? OA affects people of all races and both sexes. Most often, it occurs in  patients age 36 and above. However, it can occur sooner if you have  other risk factors (things that raise the risk of getting OA). Risk factors include:   Older age   Having family members with OA   Obesity   Joint injury or repetitive use (overuse) of joints   Joint deformity such as unequal leg length, bowlegs or knocked knees    How is osteoarthritis diagnosed? Most often doctors detect OA based on the typical symptoms (described earlier) and on results of the physical exam. In some cases, X-rays or other imaging tests may be useful to tell the extent of disease or to help rule out other joint problems. Circles indicate joints that osteoarthritis most often affects. How is osteoarthritis treated? There is no proven treatment yet that can reverse joint damage from OA. The goal of treatment is to reduce pain and improve function of the affected joints. Most often, this is possible with a mixture of physical measures and drug therapy and, sometimes, surgery. Physical measures     - Weight loss and exercise are useful in OA. Excess weight puts stress on your knee joints and hips and low back. For every 10 pounds of weight you lose over 10 years, you can reduce the chance of developing knee OA by up to 50%. Exercise can improve your muscle strength, decrease joint pain and stiffness, and lower the chance of disability due to OA. Also helpful are support (\"assistive\") devices, such as braces or a walking cane, that help you do daily activities. Heat or cold therapy can help relieve OA symptoms for a short time. Certain alternative treatments such as spa (hot tub), massage, acupuncture and chiropractic manipulation can help relieve pain for a short time.  They ensure safety and avoid drug interactions, consult your doctor or pharmacist before using any of these supplements. This is especially true when you are combining these supplements with prescribed drugs. Living with Osteoarthritis  There is no cure for OA, but you can manage how it affects your lifestyle. Some tips include:  Properly position and support your neck and back while sitting or sleeping. Adjust furniture, such as raising a chair or toilet seat. Avoid repeated motions of the joint, especially frequent bending. Lose weight if you are overweight or obese, which can reduce pain and slow progression of OA. Exercise each day. Use arthritis support devices that will help you do daily activities. You might want to work with a physical therapist or occupational therapist to learn the best exercises and to choose arthritis assistive devices. Points to remember  OA is the most common form of arthritis and can occur together with other types of arthritis. The goal of treatment in OA is to reduce pain and improve function. Exercise is an important part of OA treatment because it can decrease joint pain and improve function. At present, there is no treatment that can reverse the damage of OA in the joints. Researchers are trying to find ways to slow or reverse this joint damage. The rheumatologist's role in the treatment of osteoarthritis      Rheumatologists are doctors who are experts in diagnosing and treating arthritis and other diseases of the joints, muscles and bones. You may also need to see other health care providers, for instance, physical or occupational therapists and orthopedic doctors. Learn more about rheumatologists and rheumatology health professionals. For more information  The [employer] of Rheumatology has compiled this list to give you a starting point for your own additional research.  The ACR does not endorse or maintain these Web sites, and is not responsible for any information or claims provided on them. It is always best to talk with your rheumatologist for more information and before making any decisions about your care. Arthritis Foundation  ww.arthritis. [facility] of Arthritis and Musculoskeletal and Skin Diseases   www.[facility]. nih.gov  Rheumatology [address] [place] [address] advances research and training to improve the health of people with rheumatic diseases. © 2012 American College of Rheumatology    Osteoarthritis          Fast Facts  Though some of the joint changes are irreversible, most patients will not need joint replacement surgery. OA symptoms (what you feel) can vary greatly among patients. A rheumatologist can detect arthritis and prescribe the proper treatment. The goal of treatment in OA is to reduce pain and improve function. Exercise is an important part of OA treatment, because it can decrease joint pain and improve function. At present, there is no treatment that can reverse the damage of OA in the joints. Researchers are trying to find ways to slow or reverse this joint damage. Osteoarthritis (also known as OA) is a common joint disease that most often affects middle-age to elderly people. It is commonly referred to as \"wear and tear\" of the joints, but we now know that OA is a disease of the entire joint, involving the cartilage, joint lining, ligaments, and bone. Although it is more common in older people, it is not really accurate to say that the joints are just wearing out.  It is characterized by breakdown of the cartilage (the tissue that cushions the ends of the bones between joints), bony changes of the joints, deterioration of tendons and ligaments, and various degrees of inflammation of the joint lining (called the synovium). This arthritis tends to occur in the hand joints, spine, hips, knees, and great toes.  The lifetime risk of developing OA of the knee is about 46 percent, and the lifetime risk of developing OA of the hip is 25 percent, according to the [facility], a long-term study from the [address] [facility] and sponsored by [employer] for [employer] and [employer] (often called the [employer]) and the [employer]. OA is a top cause of disability in older people. The goal of osteoarthritis treatment is to reduce pain and improve function. There is no cure for the disease, but some treatments attempt to slow disease progression. What is osteoarthritis? OA is a frequently slowly progressive joint disease typically seen in middle-aged to elderly people. In osteoarthritis, the cartilage between the bones in the joint breaks down. This causes the affected bones to slowly get bigger. The joint cartilage often breaks down because of mechanical stress or biochemical changes within the body, causing the bone underneath to fail. OA can occur together with other types of arthritis, such as gout or rheumatoid arthritis. OA tends to affect commonly used joints such as the hands and spine, and the weight-bearing joints such as the hips and knees. Symptoms include:   Joint pain and stiffness   Knobby swelling at the joint   Cracking or grinding noise with joint movement   Decreased function of the joint    Who gets osteoarthritis? OA affects people of all races and both sexes. Most often, it occurs in patients age 36 and above. However, it can occur sooner if you have other risk factors (things that raise the risk of getting OA).  Risk factors include:   Older age   Having family members with OA   Obesity   Previous traumatic Joint injury or repetitive use (overuse) of joints   Joint deformity such as unequal leg length, bowlegs or knocked knees    How is osteoarthritis diagnosed Rheumatologists are doctors who are experts in diagnosing and treating arthritis and other diseases of the joints, muscles and bones. You may also need to see other health care providers, for instance, physical or occupational therapists and orthopedic doctors. Most often doctors detect OA based on the typical symptoms (described earlier) and on results of the physical exam. In some cases, X-rays or other imaging tests may be useful to tell the extent of disease or to help rule out other joint problems. - See more at: http://www. rheumatology. org/I-Am-A/Patient-Caregiver/Diseases-Conditions/Osteoarthritis#gianluca. CuIamGYu. dpuf    How do you treat osteoarthritis? There is no proven treatment yet that can reverse joint damage from OA. The goal of osteoarthritis treatment is to reduce pain and improve function of the affected joints. Most often, this is possible with a mixture of physical measures and drug therapy and, sometimes, surgery. Physical measures: Weight loss and exercise are useful in OA. Excess weight puts stress on your knee joints and hips and low back. For every 10 pounds of weight you lose over 10 years, you can reduce the chance of developing knee OA by up to 50 percent. Exercise can improve your muscle strength, decrease joint pain and stiffness, and lower the chance of disability due to OA. Also helpful are support (assistive) devices, such as orthotics or a walking cane, that help you do daily activities. Heat or cold therapy can help relieve OA symptoms for a short time. Certain alternative treatments such as spa (hot tub), massage, and chiropractic manipulation can help relieve pain for a short time. They can be costly, though, and require repeated treatments. Also, the long-term benefits of these alternative (sometimes called complementary or integrative) medicine treatments are unproven but are under study. Drug therapy: Forms of drug therapy include topical, oral (by mouth) and injections (shots). You apply topical drugs directly on the skin over the affected joints.  These medicines include capsaicin cream, lidocaine and diclofenac gel. Oral pain relievers such as acetaminophen are common first treatments. So are nonsteroidal anti-inflammatory drugs (often called NSAIDs), which decrease swelling and pain. In 2010, the government (FDA) approved the use of duloxetine (Cymbalta) for chronic (long-term) musculoskeletal pain including from OA. This oral drug is not new. It also is in use for other health concerns, such as mood disorders, nerve pain and fibromyalgia. Patients with more serious pain may need stronger medications, such as prescription narcotics. Joint injections with corticosteroids (sometimes called cortisone shots) or with a form of lubricant called hyaluronic acid can give months of pain relief from OA. This lubricant is given in the knee, and these shots may help delay the need for a knee replacement by a few years in some patients. Surgery: Surgical treatment becomes an option for severe cases. This includes when the joint has serious damage, or when medical treatment fails to relieve pain and you have major loss of function. Surgery may involve arthroscopy, repair of the joint done through small incisions (cuts). If the joint damage cannot be repaired, you may need a joint replacement. Supplements: Many over-the-counter nutrition supplements have been used for osteoarthritis treatment. Most lack good research data to support their effectiveness and safety. Among the most widely used are calcium, vitamin D and omega-3 fatty acids. To ensure safety and avoid drug interactions, consult your doctor or pharmacist before using any of these supplements. This is especially true when you are combining these supplements with prescribed drugs. Living with osteoarthritis There is no cure for OA, but you can manage how it affects your lifestyle. Some tips include: Properly position and support your neck and back while sitting or sleeping. Adjust furniture, such as raising a chair or toilet seat.  Avoid repeated motions of the joint, especially frequent bending. Lose weight if you are overweight or obese, which can reduce pain and slow progression of OA. Exercise each day. Use adaptive devices that will help you do daily activities. You might want to work with a physical therapist or occupational therapist to learn the best exercises and to choose arthritis assistive devices. For additional information on osteoarthritis, you may want to visit the Arthritis Foundations website: www. arthritis.org.

## 2020-08-20 LAB — CYCLIC CITRULLINATED PEPTIDE ANTIBODY IGG: <0.5 U/ML (ref 0–2.9)

## 2020-10-21 ENCOUNTER — OFFICE VISIT (OUTPATIENT)
Dept: ORTHOPEDIC SURGERY | Age: 65
End: 2020-10-21
Payer: MEDICARE

## 2020-10-21 VITALS — HEIGHT: 62 IN | BODY MASS INDEX: 27.05 KG/M2 | WEIGHT: 147 LBS

## 2020-10-21 PROCEDURE — G8417 CALC BMI ABV UP PARAM F/U: HCPCS | Performed by: ORTHOPAEDIC SURGERY

## 2020-10-21 PROCEDURE — 3017F COLORECTAL CA SCREEN DOC REV: CPT | Performed by: ORTHOPAEDIC SURGERY

## 2020-10-21 PROCEDURE — G8427 DOCREV CUR MEDS BY ELIG CLIN: HCPCS | Performed by: ORTHOPAEDIC SURGERY

## 2020-10-21 PROCEDURE — 99213 OFFICE O/P EST LOW 20 MIN: CPT | Performed by: ORTHOPAEDIC SURGERY

## 2020-10-21 PROCEDURE — 4040F PNEUMOC VAC/ADMIN/RCVD: CPT | Performed by: ORTHOPAEDIC SURGERY

## 2020-10-21 PROCEDURE — 20550 NJX 1 TENDON SHEATH/LIGAMENT: CPT | Performed by: ORTHOPAEDIC SURGERY

## 2020-10-21 PROCEDURE — 1036F TOBACCO NON-USER: CPT | Performed by: ORTHOPAEDIC SURGERY

## 2020-10-21 PROCEDURE — 1123F ACP DISCUSS/DSCN MKR DOCD: CPT | Performed by: ORTHOPAEDIC SURGERY

## 2020-10-21 PROCEDURE — G8484 FLU IMMUNIZE NO ADMIN: HCPCS | Performed by: ORTHOPAEDIC SURGERY

## 2020-10-21 RX ORDER — LIDOCAINE HYDROCHLORIDE 10 MG/ML
20 INJECTION, SOLUTION INFILTRATION; PERINEURAL ONCE
Status: COMPLETED | OUTPATIENT
Start: 2020-10-21 | End: 2020-10-21

## 2020-10-21 RX ORDER — TRIAMCINOLONE ACETONIDE 40 MG/ML
40 INJECTION, SUSPENSION INTRA-ARTICULAR; INTRAMUSCULAR ONCE
Status: COMPLETED | OUTPATIENT
Start: 2020-10-21 | End: 2020-10-21

## 2020-10-21 RX ADMIN — TRIAMCINOLONE ACETONIDE 40 MG: 40 INJECTION, SUSPENSION INTRA-ARTICULAR; INTRAMUSCULAR at 12:11

## 2020-10-21 RX ADMIN — LIDOCAINE HYDROCHLORIDE 20 ML: 10 INJECTION, SOLUTION INFILTRATION; PERINEURAL at 12:11

## 2021-02-22 ENCOUNTER — HOSPITAL ENCOUNTER (OUTPATIENT)
Dept: PHYSICAL THERAPY | Age: 66
Setting detail: THERAPIES SERIES
Discharge: HOME | End: 2021-02-22
Payer: MEDICARE

## 2021-02-22 PROCEDURE — 97161 PT EVAL LOW COMPLEX 20 MIN: CPT

## 2021-02-22 PROCEDURE — 97140 MANUAL THERAPY 1/> REGIONS: CPT

## 2021-02-22 NOTE — PLAN OF CARE
[address]  Phone: [phone]   Fax:     [phone]                                                       Physical Therapy Certification    Dear Referring Practitioner: Dr Abdelrahman Frazier,    We had the pleasure of evaluating the following patient for physical therapy services at [address] [place] [address]. A summary of our findings can be found in the initial assessment below. This includes our plan of care. If you have any questions or concerns regarding these findings, please do not hesitate to contact me at the office phone number checked above.   Thank you for the referral.       Physician Signature:_______________________________Date:__________________  By signing above (or electronic signature), therapists plan is approved by physician            Patient: Brittny Ruiz   : 1955   MRN: [id number]  Referring Physician: Referring Practitioner: Dr Abdelrahman Frazier      Evaluation Date: 2021      Medical Diagnosis Information:  Diagnosis: lumbar pain, lumbar HNP   Treatment Diagnosis: low back pain M54.5, lumbar HNP M51.26                                         Insurance information: PT Insurance Information: [place]/[facility]     Precautions/ Contra-indications: cervical spine fusion C5-7  Latex Allergy:  [x]NO      []YES  Preferred Language for Healthcare:   [x]English       []other:    C-SSRS Triggered by Intake questionnaire (Past 2 wk assessment ):   [x] No, Questionnaire did not trigger screening.   [] Yes, Patient intake triggered C-SSRS Screening      [] C-SSRS Screening completed  [] PCP notified via Epic SUBJECTIVE: Patient stated complaint: Patient reports that he went to see last year for his back pain and DDD. States that Dr Marjorie Fox performed medial branch block on R L3/4/5 1/2020 and then followed with ablation on right side of lumbar spine 2/2020 which helped for about 9 months. Had been getting migraines due to the pain in back. States that his pain started coming back in January, primarily located in back with occasional return of pain (sharp- sometimes shooting pain) to R buttock. Had massage a month or so ago which helped. Having difficulty walking dog (1-1.5 miles), had some difficulty with shoveling snow, running vacuum. Does ok with standing for cooking. Does ok with sitting in certain chair, car rides aren't great.        Relevant Medical History: prior cervical fusion, injection and ablation in back  Functional Scale/Score: HERVE 20% disability    Pain Scale: 0-7/10, 3/10 currenlty  Easing factors: heat  Provocative factors: walking     Type: []Constant   [x]Intermittent  []Radiating []Localized []other:     Numbness/Tingling: none    Occupation/School: insurance sales    Living Status/Prior Level of Function: Independent with ADLs and IADLs, walking dog    OBJECTIVE:   Repeated Movements:    ROM  Comments   Lumbar Flex Mid shin    Lumbar Ext Limited-tight not painful      ROM LEFT RIGHT Comments   Lumbar Side Bend WNL Limited with deviation to R    Lumbar Rotation limited limited L>R   Quadrant      Hip Flexion 100 95    Hip Abd      Hip ER 45 45    Hip IR 25 0    Hip Extension      Knee Ext      Knee Flex      Hamstring Flex Limited bilaterally Limited bilaterally Equally limited at 50 degrees- hamstring stretch   Piriformis limited limited    Renny test                Myotomes/Strength Normal Abnormal Comments   [x]ALL NORMAL      Hip Abd   bilat 3+/5   Hip Ext   bilat 3+/5   Hip flexion (L1-L2 femoral) [] [] bilat 4-/5   Knee extension (L2-L4 femoral) [] []    Knee flexion (S1 sciatic) Dorsiflexion (L4-L5 deep peroneal) [] []    Great Toe Ext (L5 deep peroneal nerve) [] []    Ankle Eversion (S1-S2 super peroneal) [] []    Ankle PF(S1-S2 tibial) [] []    Multifidus [] []    Transverse Ab [] []      Dermatomes Normal Abnormal Comments   [x]ALL NORMAL            inguinal area (L1)  [] []    anterior mid-thigh (L2) [] []    distal ant thigh/med knee (L3) [] []    medial lower leg and foot (L4) [] []    lateral lower leg and foot (L5) [] []    posterior calf (S1) [] []    medial calcaneus (S2) [] []      Reflexes Normal Abnormal Comments   [x]ALL NORMAL            S1-2 Seated achilles [] []    S1-2 Prone knee bend [] []    L3-4 Patellar tendon [] []    C5-6 Biceps [] []    C6 Brachioradialis [] []    C7-8 Triceps [] []    Clonus [] []    Babinski [] []    Lugo's [] []      Joint mobility: limited in R hip mobility and lumbar mobility   []Normal    [x]Hypo   []Hyper    Palpation: tender TP/SP L4/5 and L5/S1    Functional Mobility/Transfers: limited in sitting for long periods on unfamiliar surface, limited in walking, limited with driving    Posture: increased R LE hip ER    Gait: (include devices/WB status) antalgic gait with decreased stance time on R; increased R LE ER    Bandages/Dressings/Incisions: NA    Neurodynamics: normal    Orthopedic Special Tests:    Neural dynamic tension testing Normal Abnormal Comments   Slump Test  - Degree of knee flexion:  [x] []    SLR  [x] []    0-30 [] []    30-70 [] []    Femoral nerve (L2-4) [x] []       Normal Abnormal N/A Comments   Fwd Bend-aberrant or innominate mvmt) [] [] []    Trendelenburg [] [] []    Kemps/Quadrant [] [] []    Ronaldo Ruby [] [] []    JOLYNN/Abimael [] [] []    Hip scour [] [] []    Supine to sit [] [] []    Prone knee bend [x] [] []           Hip thrust [] [] []    SI distraction/compression [x] [] []    Sacral Spring/thrust [x] [] []               [x] Patient history, allergies, meds reviewed. Medical chart reviewed. See intake form. Review Of Systems (ROS):  [x]Performed Review of systems (Integumentary, CardioPulmonary, Neurological) by intake and observation. Intake form has been scanned into medical record. Patient has been instructed to contact their primary care physician regarding ROS issues if not already being addressed at this time. Co-morbidities/Complexities (which will affect course of rehabilitation):   []None           Arthritic conditions   []Rheumatoid arthritis (M05.9)  [x]Osteoarthritis (M19.91)   Cardiovascular conditions   [x]Hypertension (I10)  []Hyperlipidemia (E78.5)  []Angina pectoris (I20)  []Atherosclerosis (I70)  []CVA Musculoskeletal conditions   []Disc pathology   []Congenital spine pathologies   []Prior surgical intervention  []Osteoporosis (M81.8)  []Osteopenia (M85.8)   Endocrine conditions   []Hypothyroid (E03.9)  []Hyperthyroid Gastrointestinal conditions   []Constipation (J02.47)   Metabolic conditions   []Morbid obesity (E66.01)  []Diabetes type 1(E10.65) or 2 (E11.65)   []Neuropathy (G60.9)     Pulmonary conditions   []Asthma (J45)  []Coughing   []COPD (J44.9)   Psychological Disorders  [x]Anxiety (F41.9)  []Depression (F32.9)   []Other:   []Other:           Barriers to/and or personal factors that will affect rehab potential:              []Age  []Sex    []Smoker              []Motivation/Lack of Motivation                        []Co-Morbidities              []Cognitive Function, education/learning barriers              []Environmental, home barriers              []profession/work barriers  []past PT/medical experience  []other:  Justification:     Falls Risk Assessment (30 days):   [x] Falls Risk assessed and no intervention required.   [] Falls Risk assessed and Patient requires intervention due to being higher risk   TUG score (>12s at risk):     [] Falls education provided, including: ASSESSMENT: Patient is a 75 yo male who presents to therapy with acute on chronic onset of LBP. Upon assessment, patient with decreased R hip joint mobility, decreased lumbar mobility, decreased posterior chain strength, as well as increased soft tissue restriction in lumbar spine and decreased hip flexibility. No noted trigger points in glutes or piriformis. Patient tolerated manual therapy with good improvements in hip IR to 20-25 degrees post mobilization. Patient will benefit from further skilled PT services to address noted deficits. Functional Impairments:     [x]Noted lumbar/proximal hip hypomobility   []Noted lumbosacral and/or generalized hypermobility   [x]Decreased Lumbosacral/hip/LE functional ROM   [x]Decreased core/proximal hip strength and neuromuscular control    [x]Decreased LE functional strength    []Abnormal reflexes/sensation/myotomal/dermatomal deficits  []Reduced balance/proprioceptive control    []other:      Functional Activity Limitations (from functional questionnaire and intake)   [x]Reduced ability to tolerate prolonged functional positions   [x]Reduced ability or difficulty with changes of positions or transfers between positions   []Reduced ability to maintain good posture and demonstrate good body mechanics with sitting, bending, and lifting   []Reduced ability to sleep   [x] Reduced ability or tolerance with driving and/or computer work   []Reduced ability to perform lifting, reaching, carrying tasks   []Reduced ability to squat   []Reduced ability to forward bend   [x]Reduced ability to ambulate prolonged functional periods/distances/surfaces   []Reduced ability to ascend/descend stairs   []other:       Participation Restrictions   []Reduced participation in self care activities   [x]Reduced participation in home management activities   []Reduced participation in work activities   []Reduced participation in social activities. []Reduced participation in sport/recreational activities. Classification:   []Signs/symptoms consistent with Lumbar instability/stabilization subgroup. [x]Signs/symptoms consistent with Lumbar mobilization/manipulation subgroup, myotomes and dermatomes intact. Meets manipulation criteria. []Signs/symptoms consistent with Lumbar direction specific/centralization subgroup   []Signs/symptoms consistent with Lumbar traction subgroup       [x]Signs/symptoms consistent with lumbar facet dysfunction   []Signs/symptoms consistent with lumbar stenosis type dysfunction   []Signs/symptoms consistent with nerve root involvement including myotome & dermatome dysfunction   []Signs/symptoms consistent with post-surgical status including: decreased ROM, strength and function.    [x]signs/symptoms consistent with pathology which may benefit from Dry needling     []other:      Prognosis/Rehab Potential:      []Excellent   [x]Good    []Fair   []Poor    Tolerance of evaluation/treatment:    []Excellent   [x]Good    []Fair   []Poor     Physical Therapy Evaluation Complexity Justification  [x] A history of present problem with:  [x] no personal factors and/or comorbidities that impact the plan of care;  []1-2 personal factors and/or comorbidities that impact the plan of care  []3 personal factors and/or comorbidities that impact the plan of care  [x] An examination of body systems using standardized tests and measures addressing any of the following: body structures and functions (impairments), activity limitations, and/or participation restrictions;:  [x] a total of 1-2 or more elements   [] a total of 3 or more elements   [] a total of 4 or more elements   [x] A clinical presentation with:  [x] stable and/or uncomplicated characteristics   [] evolving clinical presentation with changing characteristics  [] unstable and unpredictable characteristics; [x] Clinical decision making of [x] low, [] moderate, [] high complexity using standardized patient assessment instrument and/or measurable assessment of functional outcome. [x] EVAL (LOW) 84606 (typically 20 minutes face-to-face)  [] EVAL (MOD) 48115 (typically 30 minutes face-to-face)  [] EVAL (HIGH) 11747 (typically 45 minutes face-to-face)  [] RE-EVAL     PLAN: Begin PT focusing on: proximal hip mobilizations, LB mobs, LB core activation, proximal hip activation, and HEP    Frequency/Duration:  2 days per week for 4 Weeks:  Interventions:  [x]  Therapeutic exercise including: strength training, ROM, for LE, Glutes and core   [x]  NMR activation and proprioception for glutes , LE and Core   [x]  Manual therapy as indicated for Hip complex, LE and spine to include: Dry Needling/IASTM, STM, PROM, Gr I-IV mobilizations, manipulation. [x]  Modalities as needed that may include: thermal agents, E-stim, Biofeedback, US, iontophoresis as indicated  [x]  Patient education on joint protection, postural re-education, activity modification, progression of HEP. HEP instruction: SL rotation stretch, SKTC R hip(see scanned forms)    GOALS:  Patient stated goal: decrease pain  [] Progressing: [] Met: [] Not Met: [] Adjusted  Therapist goals for Patient:   Short Term Goals: To be achieved in: 2 weeks  1. Independent in HEP and progression per patient tolerance, in order to prevent re-injury. [] Progressing: [] Met: [] Not Met: [] Adjusted  2. Patient will have a decrease in pain to facilitate improvement in movement, function, and ADLs as indicated by Functional Deficits. [] Progressing: [] Met: [] Not Met: [] Adjusted    Long Term Goals: To be achieved in: 4 weeks  1. Disability index score of 10% or less for the HERVE to assist with reaching prior level of function.    [] Progressing: [] Met: [] Not Met: [] Adjusted 2. Patient will demonstrate increased AROM to WNL, good LS mobility, good hip ROM to allow for proper joint functioning as indicated by patients Functional Deficits. [] Progressing: [] Met: [] Not Met: [] Adjusted  3. Patient will demonstrate an increase in Strength to good proximal hip and core activation to allow for proper functional mobility as indicated by patients Functional Deficits. [] Progressing: [] Met: [] Not Met: [] Adjusted  4. Patient will return to walking, standing, sitting functional activities without increased symptoms or restriction. [] Progressing: [] Met: [] Not Met: [] Adjusted  5.  Patient will report being able to walk dog without increased symptoms or restriction. (patient specific functional goal)    [] Progressing: [] Met: [] Not Met: [] Adjusted     Electronically signed by:  Gamal Das PT

## 2021-02-22 NOTE — FLOWSHEET NOTE
[address]  Phone: [phone]   Fax:     [phone]    Physical Therapy Treatment Note/ Progress Report:     Date:  2021    Patient Name:  Miladys Robertson    :  1955  MRN: [id number]  Restrictions/Precautions:    Medical/Treatment Diagnosis Information:  · Diagnosis: lumbar pain, lumbar HNP  · Treatment Diagnosis: low back pain M54.5, lumbar HNP Q15.39  Insurance/Certification information:  PT Insurance Information: [employer]/[facility]  Physician Information:  Referring Practitioner: Dr Titi Herrera  care signed (Y/N):     Date of Patient follow up with Physician:      Progress Report: [x]  Yes  []  No     Date Range for reporting period:  Beginnin2021  Ending: -    Progress report due (10 Rx/or 30 days whichever is less):      Recertification due (POC duration/ or 90 days whichever is less): 3/21/2021     Visit # Insurance Allowable Auth Needed   1 Medicare/[facility] (medicare guidelines) []Yes    [x]No     Pain level:  0-7/10   Functional Scale: HERVE 20% disability   Date Assessed: 2021    SUBJECTIVE:  See eval    OBJECTIVE: See eval  ? Observation:   ? Test measurements:      RESTRICTIONS/PRECAUTIONS: cervical fusion    Exercises/Interventions:     Therapeutic Ex (02323)   Min: 5 min sets/sec reps notes   Hip Ext      Bridge       SKTC stretch R 30 3    Side lying LB rot 10 10    Front Plank      Side planks       Kneeling hip abd/ext      1/2 kneeling down chop      Std band pull down      SL hip abd/clam      Lateral band pull      Lateral band walk      Bosu Lunge      Slide lunge       Ham string stretch      Hip Flex stretch      Glute Stretch      SLS Ball/wall glute      Manual Intervention (35847) Min: 16 min      DN      Prone PA 1 10    GISTM/STM      Lumbar Manip      SI Manip      Hip belt mobs 1 6 R hip   Hip LA distraction            NMR re-education (56173)   Min: Mf Activation- re-ed      TrA Re-ed activation      Glute Max re-ed activation      Prone aruna Anderson            Therapeutic Activity (62597) Min:                                Therapeutic Exercise and NMR EXR  [x] (26049) Provided verbal/tactile cueing for activities related to strengthening, flexibility, endurance, ROM  for improvements in proximal hip and core control with self care, mobility, lifting and ambulation. [x] (03391) Provided verbal/tactile cueing for activities related to improving balance, coordination, kinesthetic sense, posture, motor skill, proprioception  to assist with core control in self care, mobility, lifting, and ambulation.      Therapeutic Activities:    [] (69001 or 87281) Provided verbal/tactile cueing for activities related to improving balance, coordination, kinesthetic sense, posture, motor skill, proprioception and motor activation to allow for proper function  with self care and ADLs  [] (24111) Provided training and instruction to the patient for proper core and proximal hip recruitment and positioning with ambulation re-education     Home Exercise Program:    [x] (22588) Reviewed/Progressed HEP activities related to strengthening, flexibility, endurance, ROM of core, proximal hip and LE for functional self-care, mobility, lifting and ambulation   [] (67087) Reviewed/Progressed HEP activities related to improving balance, coordination, kinesthetic sense, posture, motor skill, proprioception of core, proximal hip and LE for self care, mobility, lifting, and ambulation      Manual Treatments:  PROM / STM / Oscillations-Mobs:  G-I, II, III, IV (PA's, Inf., Post.) [x] (25158) Provided manual therapy to mobilize proximal hip and LS spine soft tissue/joints for the purpose of modulating pain, promoting relaxation,  increasing ROM, reducing/eliminating soft tissue swelling/inflammation/restriction, improving soft tissue extensibility and allowing for proper ROM for normal function with self care, mobility, lifting and ambulation. Modalities:       Charges:  Timed Code Treatment Minutes: 21   Total Treatment Minutes: 40     [x] EVAL (LOW) 75498 (typically 20 minutes face-to-face)  [] EVAL (MOD) 86367 (typically 30 minutes face-to-face)  [] EVAL (HIGH) 98632 (typically 45 minutes face-to-face)  [] RE-EVAL     [] RM(22757) x     [] DRY NEEDLE 1 OR 2 MUSCLES  [] NMR (58559) x     [] DRY NEEDLE 3+ MUSCLES  [x] Manual (69276) x  1     [] TA (99826) x     [] Mech Traction (80085)  [] ES(attended) (01000)     [] ES (un) (79799):   [] VASO (41897)  [] Other:    If [facility] Please Indicate Time In/Out  CPT Code Time in Time out                                     GOALS:  Patient stated goal: decrease pain  [] Progressing: [] Met: [] Not Met: [] Adjusted  Therapist goals for Patient:   Short Term Goals: To be achieved in: 2 weeks  1. Independent in HEP and progression per patient tolerance, in order to prevent re-injury. [] Progressing: [] Met: [] Not Met: [] Adjusted  2. Patient will have a decrease in pain to facilitate improvement in movement, function, and ADLs as indicated by Functional Deficits. [] Progressing: [] Met: [] Not Met: [] Adjusted    Long Term Goals: To be achieved in: 4 weeks  1. Disability index score of 10% or less for the HERVE to assist with reaching prior level of function. [] Progressing: [] Met: [] Not Met: [] Adjusted  2. Patient will demonstrate increased AROM to WNL, good LS mobility, good hip ROM to allow for proper joint functioning as indicated by patients Functional Deficits.    [] Progressing: [] Met: [] Not Met: [] Adjusted 3. Patient will demonstrate an increase in Strength to good proximal hip and core activation to allow for proper functional mobility as indicated by patients Functional Deficits. [] Progressing: [] Met: [] Not Met: [] Adjusted  4. Patient will return to walking, standing, sitting functional activities without increased symptoms or restriction. [] Progressing: [] Met: [] Not Met: [] Adjusted  5. Patient will report being able to walk dog without increased symptoms or restriction. (patient specific functional goal)    [] Progressing: [] Met: [] Not Met: [] Adjusted     ASSESSMENT:  See eval    Treatment/Activity Tolerance:  [x] Patient tolerated treatment well [] Patient limited by fatique  [] Patient limited by pain  [] Patient limited by other medical complications  [] Other:     Overall Progression Towards Functional goals/ Treatment Progress Update:  [] Patient is progressing as expected towards functional goals listed. [] Progression is slowed due to complexities/Impairments listed. [] Progression has been slowed due to co-morbidities. [x] Plan just implemented, too soon to assess goals progression <30days   [] Goals require adjustment due to lack of progress  [] Patient is not progressing as expected and requires additional follow up with physician  [] Other:    Prognosis for POC: [x] Good [] Fair  [] Poor    Patient requires continued skilled intervention: [x] Yes  [] No        PLAN: See eval  [] Continue per plan of care [] Alter current plan (see comments)  [x] Plan of care initiated [] Hold pending MD visit [] Discharge    Electronically signed by: Cherelle Wagoner PT    Note: If patient does not return for scheduled/recommended follow up visits, this note will serve as a discharge from care along with the most recent update on progress.

## 2021-02-26 ENCOUNTER — APPOINTMENT (OUTPATIENT)
Dept: PHYSICAL THERAPY | Age: 66
End: 2021-02-26
Payer: MEDICARE

## 2021-03-01 ENCOUNTER — HOSPITAL ENCOUNTER (OUTPATIENT)
Dept: PHYSICAL THERAPY | Age: 66
Setting detail: THERAPIES SERIES
Discharge: HOME | End: 2021-03-01
Payer: MEDICARE

## 2021-03-01 PROCEDURE — 97110 THERAPEUTIC EXERCISES: CPT

## 2021-03-01 PROCEDURE — 97140 MANUAL THERAPY 1/> REGIONS: CPT

## 2021-03-01 NOTE — FLOWSHEET NOTE
[address] [place] [address]  Phone: [phone]   Fax:     [phone]    Physical Therapy Treatment Note/ Progress Report:     Date:  3/1/2021    Patient Name:  Rachael Panchal    :  1955  MRN: [id number]  Restrictions/Precautions:    Medical/Treatment Diagnosis Information:  · Diagnosis: lumbar pain, lumbar HNP  · Treatment Diagnosis: low back pain M54.5, lumbar HNP K65.50  Insurance/Certification information:  PT Insurance Information: [employer]/[facility]  Physician Information:  Referring Practitioner: Dr Shields Pain of care signed (Y/N):     Date of Patient follow up with Physician:      Progress Report: [x]  Yes  []  No     Date Range for reporting period:  Beginnin2021  Ending: -    Progress report due (10 Rx/or 30 days whichever is less):      Recertification due (POC duration/ or 90 days whichever is less): 3/21/2021     Visit # Insurance Allowable Auth Needed   2 Medicare/[facility] (medicare guidelines) []Yes    [x]No     Pain level:  0-7/10   Functional Scale: HERVE 20% disability   Date Assessed: 2021    SUBJECTIVE:  Walked a mile and a half today- doing ok, just still sore in low back, not much down legs. Notes that pull downs are hurting wrists.       OBJECTIVE:   ? Observation:   ? Test measurements:      RESTRICTIONS/PRECAUTIONS: cervical fusion    Exercises/Interventions:     Therapeutic Ex (16044)   Min: 15 min sets/sec reps notes   Hip Ext      Bridge       SKTC stretch R 30 3    Side lying LB rot 10 10    Front Plank      Side planks       Kneeling hip abd/ext      1/2 kneeling down chop      Wall march  10    Standing hip abd  10    Lateral band pull      Lateral band walk      Bosu Lunge      Slide lunge       Ham string stretch  30    Hip Flex stretch  30    Glute Stretch      SLS Ball/wall glute      Manual Intervention (49642) Min: 20 min      DN      Prone PA 1 10    GISTM/STM  4 Lumbar Manip      SI Manip      Hip  Mobs/ROM 1 6 B hips   Hip LA distraction            NMR re-education (13175)   Min:      Mf Activation- re-ed      TrA Re-ed activation      Glute Max re-ed activation      Prone aruna Anderson            Therapeutic Activity (78432) Min:                                Therapeutic Exercise and NMR EXR  [x] (58888) Provided verbal/tactile cueing for activities related to strengthening, flexibility, endurance, ROM  for improvements in proximal hip and core control with self care, mobility, lifting and ambulation. [x] (90091) Provided verbal/tactile cueing for activities related to improving balance, coordination, kinesthetic sense, posture, motor skill, proprioception  to assist with core control in self care, mobility, lifting, and ambulation.      Therapeutic Activities:    [] (13738 or 06225) Provided verbal/tactile cueing for activities related to improving balance, coordination, kinesthetic sense, posture, motor skill, proprioception and motor activation to allow for proper function  with self care and ADLs  [] (07047) Provided training and instruction to the patient for proper core and proximal hip recruitment and positioning with ambulation re-education     Home Exercise Program:    [x] (33954) Reviewed/Progressed HEP activities related to strengthening, flexibility, endurance, ROM of core, proximal hip and LE for functional self-care, mobility, lifting and ambulation   [] (36686) Reviewed/Progressed HEP activities related to improving balance, coordination, kinesthetic sense, posture, motor skill, proprioception of core, proximal hip and LE for self care, mobility, lifting, and ambulation      Manual Treatments:  PROM / STM / Oscillations-Mobs:  G-I, II, III, IV (PA's, Inf., Post.) [x] (74006) Provided manual therapy to mobilize proximal hip and LS spine soft tissue/joints for the purpose of modulating pain, promoting relaxation,  increasing ROM, reducing/eliminating soft tissue swelling/inflammation/restriction, improving soft tissue extensibility and allowing for proper ROM for normal function with self care, mobility, lifting and ambulation. Modalities:       Charges:  Timed Code Treatment Minutes: 35   Total Treatment Minutes: 40     [] EVAL (LOW) 89589 (typically 20 minutes face-to-face)  [] EVAL (MOD) 07269 (typically 30 minutes face-to-face)  [] EVAL (HIGH) 34566 (typically 45 minutes face-to-face)  [] RE-EVAL     [x] NV(20459) x  1   [] DRY NEEDLE 1 OR 2 MUSCLES  [] NMR (84349) x     [] DRY NEEDLE 3+ MUSCLES  [x] Manual (86507) x  1     [] TA (56593) x     [] Mech Traction (99743)  [] ES(attended) (51399)     [] ES (un) (78148):   [] VASO (92888)  [] Other:    If [facility] Please Indicate Time In/Out  CPT Code Time in Time out                                     GOALS:  Patient stated goal: decrease pain  [] Progressing: [] Met: [] Not Met: [] Adjusted  Therapist goals for Patient:   Short Term Goals: To be achieved in: 2 weeks  1. Independent in HEP and progression per patient tolerance, in order to prevent re-injury. [] Progressing: [] Met: [] Not Met: [] Adjusted  2. Patient will have a decrease in pain to facilitate improvement in movement, function, and ADLs as indicated by Functional Deficits. [] Progressing: [] Met: [] Not Met: [] Adjusted    Long Term Goals: To be achieved in: 4 weeks  1. Disability index score of 10% or less for the HERVE to assist with reaching prior level of function. [] Progressing: [] Met: [] Not Met: [] Adjusted  2. Patient will demonstrate increased AROM to WNL, good LS mobility, good hip ROM to allow for proper joint functioning as indicated by patients Functional Deficits.    [] Progressing: [] Met: [] Not Met: [] Adjusted 3. Patient will demonstrate an increase in Strength to good proximal hip and core activation to allow for proper functional mobility as indicated by patients Functional Deficits. [] Progressing: [] Met: [] Not Met: [] Adjusted  4. Patient will return to walking, standing, sitting functional activities without increased symptoms or restriction. [] Progressing: [] Met: [] Not Met: [] Adjusted  5. Patient will report being able to walk dog without increased symptoms or restriction. (patient specific functional goal)    [] Progressing: [] Met: [] Not Met: [] Adjusted     ASSESSMENT:  Did well with hip mobility. Work on longer strides. Treatment/Activity Tolerance:  [x] Patient tolerated treatment well [] Patient limited by fatique  [] Patient limited by pain  [] Patient limited by other medical complications  [] Other:     Overall Progression Towards Functional goals/ Treatment Progress Update:  [] Patient is progressing as expected towards functional goals listed. [] Progression is slowed due to complexities/Impairments listed. [] Progression has been slowed due to co-morbidities. [x] Plan just implemented, too soon to assess goals progression <30days   [] Goals require adjustment due to lack of progress  [] Patient is not progressing as expected and requires additional follow up with physician  [] Other:    Prognosis for POC: [x] Good [] Fair  [] Poor    Patient requires continued skilled intervention: [x] Yes  [] No        PLAN: See eval  [x] Continue per plan of care [] Alter current plan (see comments)  [] Plan of care initiated [] Hold pending MD visit [] Discharge    Electronically signed by: Jocelynn Godoy PT    Note: If patient does not return for scheduled/recommended follow up visits, this note will serve as a discharge from care along with the most recent update on progress.

## 2021-03-03 ENCOUNTER — HOSPITAL ENCOUNTER (OUTPATIENT)
Dept: PHYSICAL THERAPY | Age: 66
Setting detail: THERAPIES SERIES
Discharge: HOME | End: 2021-03-03
Payer: MEDICARE

## 2021-03-03 PROCEDURE — 97110 THERAPEUTIC EXERCISES: CPT

## 2021-03-03 PROCEDURE — 97140 MANUAL THERAPY 1/> REGIONS: CPT

## 2021-03-03 NOTE — FLOWSHEET NOTE
[address]  Phone: [phone]   Fax:     [phone]    Physical Therapy Treatment Note/ Progress Report:     Date:  3/3/2021    Patient Name:  Lavinia Umaña    :  1955  MRN: [id number]  Restrictions/Precautions:    Medical/Treatment Diagnosis Information:  · Diagnosis: lumbar pain, lumbar HNP  · Treatment Diagnosis: low back pain M54.5, lumbar HNP J50.41  Insurance/Certification information:  PT Insurance Information: [place]/[facility]  Physician Information:  Referring Practitioner: Dr Lila Patel of care signed (Y/N):     Date of Patient follow up with Physician:      Progress Report: [x]  Yes  []  No     Date Range for reporting period:  Beginnin2021  Ending: -    Progress report due (10 Rx/or 30 days whichever is less):      Recertification due (POC duration/ or 90 days whichever is less): 3/21/2021     Visit # Insurance Allowable Auth Needed   3 Medicare/[facility] (medicare guidelines) []Yes    [x]No     Pain level:  0-7/10   Functional Scale: HERVE 20% disability   Date Assessed: 2021    SUBJECTIVE:  Feels that mobility work has him doing better, didn't walk dogs this morning so not too bad.        OBJECTIVE:   ? Observation:   ? Test measurements:      RESTRICTIONS/PRECAUTIONS: cervical fusion    Exercises/Interventions:     Therapeutic Ex (21458)   Min: 15 min sets/sec reps notes   Hip Ext      Bridge       SKTC stretch R 30 3    Side lying LB rot 10 10    Front Plank      Side planks       Kneeling hip abd/ext      1/2 kneeling down chop      Wall march  10    Standing hip abd  10    Standing hip ext  10    Lateral band walk      Bosu Lunge      Slide lunge       Ham string stretch  30    Hip Flex stretch  30    Glute Stretch      SLS Ball/wall glute      Manual Intervention (04707) Min: 20 min      DN      Prone PA 1 10    GISTM/STM  4    Lumbar Manip      SI Manip Hip  Mobs/ROM 1 6 B hips   Hip LA distraction            NMR re-education (13461)   Min:      Mf Activation- re-ed      TrA Re-ed activation      Glute Max re-ed activation      Prone aruna Anderson            Therapeutic Activity (83399) Min:                                Therapeutic Exercise and NMR EXR  [x] (87928) Provided verbal/tactile cueing for activities related to strengthening, flexibility, endurance, ROM  for improvements in proximal hip and core control with self care, mobility, lifting and ambulation. [x] (23747) Provided verbal/tactile cueing for activities related to improving balance, coordination, kinesthetic sense, posture, motor skill, proprioception  to assist with core control in self care, mobility, lifting, and ambulation.      Therapeutic Activities:    [] (32498 or 39017) Provided verbal/tactile cueing for activities related to improving balance, coordination, kinesthetic sense, posture, motor skill, proprioception and motor activation to allow for proper function  with self care and ADLs  [] (41436) Provided training and instruction to the patient for proper core and proximal hip recruitment and positioning with ambulation re-education     Home Exercise Program:    [x] (75829) Reviewed/Progressed HEP activities related to strengthening, flexibility, endurance, ROM of core, proximal hip and LE for functional self-care, mobility, lifting and ambulation   [] (48181) Reviewed/Progressed HEP activities related to improving balance, coordination, kinesthetic sense, posture, motor skill, proprioception of core, proximal hip and LE for self care, mobility, lifting, and ambulation      Manual Treatments:  PROM / STM / Oscillations-Mobs:  G-I, II, III, IV (PA's, Inf., Post.) [x] (88411) Provided manual therapy to mobilize proximal hip and LS spine soft tissue/joints for the purpose of modulating pain, promoting relaxation,  increasing ROM, reducing/eliminating soft tissue swelling/inflammation/restriction, improving soft tissue extensibility and allowing for proper ROM for normal function with self care, mobility, lifting and ambulation. Modalities:       Charges:  Timed Code Treatment Minutes: 35   Total Treatment Minutes: 40     [] EVAL (LOW) 40293 (typically 20 minutes face-to-face)  [] EVAL (MOD) 77371 (typically 30 minutes face-to-face)  [] EVAL (HIGH) 54646 (typically 45 minutes face-to-face)  [] RE-EVAL     [x] GI(31727) x  1   [] DRY NEEDLE 1 OR 2 MUSCLES  [] NMR (10228) x     [] DRY NEEDLE 3+ MUSCLES  [x] Manual (34184) x  1     [] TA (60322) x     [] Mech Traction (10998)  [] ES(attended) (05462)     [] ES (un) (01135):   [] VASO (16836)  [] Other:    If [facility] Please Indicate Time In/Out  CPT Code Time in Time out                                     GOALS:  Patient stated goal: decrease pain  [] Progressing: [] Met: [] Not Met: [] Adjusted  Therapist goals for Patient:   Short Term Goals: To be achieved in: 2 weeks  1. Independent in HEP and progression per patient tolerance, in order to prevent re-injury. [] Progressing: [] Met: [] Not Met: [] Adjusted  2. Patient will have a decrease in pain to facilitate improvement in movement, function, and ADLs as indicated by Functional Deficits. [] Progressing: [] Met: [] Not Met: [] Adjusted    Long Term Goals: To be achieved in: 4 weeks  1. Disability index score of 10% or less for the HERVE to assist with reaching prior level of function. [] Progressing: [] Met: [] Not Met: [] Adjusted  2. Patient will demonstrate increased AROM to WNL, good LS mobility, good hip ROM to allow for proper joint functioning as indicated by patients Functional Deficits.    [] Progressing: [] Met: [] Not Met: [] Adjusted 3. Patient will demonstrate an increase in Strength to good proximal hip and core activation to allow for proper functional mobility as indicated by patients Functional Deficits. [] Progressing: [] Met: [] Not Met: [] Adjusted  4. Patient will return to walking, standing, sitting functional activities without increased symptoms or restriction. [] Progressing: [] Met: [] Not Met: [] Adjusted  5. Patient will report being able to walk dog without increased symptoms or restriction. (patient specific functional goal)    [] Progressing: [] Met: [] Not Met: [] Adjusted     ASSESSMENT:  Doing well with hip mobility and soft tissue work around low back. Work on longer strides and proximal hip stability without use of wrists. Treatment/Activity Tolerance:  [x] Patient tolerated treatment well [] Patient limited by fatique  [] Patient limited by pain  [] Patient limited by other medical complications  [] Other:     Overall Progression Towards Functional goals/ Treatment Progress Update:  [] Patient is progressing as expected towards functional goals listed. [] Progression is slowed due to complexities/Impairments listed. [] Progression has been slowed due to co-morbidities. [x] Plan just implemented, too soon to assess goals progression <30days   [] Goals require adjustment due to lack of progress  [] Patient is not progressing as expected and requires additional follow up with physician  [] Other:    Prognosis for POC: [x] Good [] Fair  [] Poor    Patient requires continued skilled intervention: [x] Yes  [] No        PLAN: See eval  [x] Continue per plan of care [] Alter current plan (see comments)  [] Plan of care initiated [] Hold pending MD visit [] Discharge    Electronically signed by: Adarsh Alejo PT    Note: If patient does not return for scheduled/recommended follow up visits, this note will serve as a discharge from care along with the most recent update on progress.

## 2021-03-05 ENCOUNTER — HOSPITAL ENCOUNTER (OUTPATIENT)
Dept: PHYSICAL THERAPY | Age: 66
Setting detail: THERAPIES SERIES
Discharge: HOME | End: 2021-03-05
Payer: MEDICARE

## 2021-03-05 PROCEDURE — 97140 MANUAL THERAPY 1/> REGIONS: CPT

## 2021-03-05 PROCEDURE — 97110 THERAPEUTIC EXERCISES: CPT

## 2021-03-05 NOTE — FLOWSHEET NOTE
[address] [place] [address]  Phone: [phone]   Fax:     [phone]    Physical Therapy Treatment Note/ Progress Report:     Date:  3/5/2021    Patient Name:  Pancho Burnette    :  1955  MRN: [id number]  Restrictions/Precautions:    Medical/Treatment Diagnosis Information:  · Diagnosis: lumbar pain, lumbar HNP  · Treatment Diagnosis: low back pain M54.5, lumbar HNP U61.88  Insurance/Certification information:  PT Insurance Information: [employer]/[facility]  Physician Information:  Referring Practitioner: Dr Bronson Carrera of care signed (Y/N):     Date of Patient follow up with Physician:      Progress Report: [x]  Yes  []  No     Date Range for reporting period:  Beginnin2021  Ending: -    Progress report due (10 Rx/or 30 days whichever is less): 6804     Recertification due (POC duration/ or 90 days whichever is less): 3/21/2021     Visit # Insurance Allowable Auth Needed   4 Medicare/[facility] (medicare guidelines) []Yes    [x]No     Pain level:  4/10   Functional Scale: HERVE 20% disability   Date Assessed: 2021    SUBJECTIVE:  Patient reports that he is nice and stiff today. Feeling a little in back as well today.        OBJECTIVE:   ? Observation:   ? Test measurements:      RESTRICTIONS/PRECAUTIONS: cervical fusion    Exercises/Interventions:     Therapeutic Ex (95223)   Min: 25 min sets/sec reps notes   Hip Ext      piriformis stretch bilat 30 3    SKTC stretch R 30 3    Side lying LB rot 10 10 bilat   Front Plank      Side planks       Kneeling hip abd/ext      1/2 kneeling down chop      Wall march  10    Standing hip abd  10    Standing hip ext  10    Lateral band walk      Bosu Lunge      Slide lunge       Ham string stretch  30 stool   Hip Flex stretch  30 stool   Glute Stretch      SLS Ball/wall glute      Manual Intervention (58045) Min: 20 min      DN      Prone PA 1 10    GISTM/STM  4    Lumbar Manip SI Manip      Hip  Mobs/ROM 1 6 B hips   Hip LA distraction            NMR re-education (11147)   Min:      Mf Activation- re-ed      TrA Re-ed activation      Glute Max re-ed activation      Prone aruna Anderson            Therapeutic Activity (61860) Min:                                Therapeutic Exercise and NMR EXR  [x] (96898) Provided verbal/tactile cueing for activities related to strengthening, flexibility, endurance, ROM  for improvements in proximal hip and core control with self care, mobility, lifting and ambulation. [x] (98698) Provided verbal/tactile cueing for activities related to improving balance, coordination, kinesthetic sense, posture, motor skill, proprioception  to assist with core control in self care, mobility, lifting, and ambulation.      Therapeutic Activities:    [] (42610 or 51473) Provided verbal/tactile cueing for activities related to improving balance, coordination, kinesthetic sense, posture, motor skill, proprioception and motor activation to allow for proper function  with self care and ADLs  [] (63260) Provided training and instruction to the patient for proper core and proximal hip recruitment and positioning with ambulation re-education     Home Exercise Program:    [x] (03492) Reviewed/Progressed HEP activities related to strengthening, flexibility, endurance, ROM of core, proximal hip and LE for functional self-care, mobility, lifting and ambulation   [] (57591) Reviewed/Progressed HEP activities related to improving balance, coordination, kinesthetic sense, posture, motor skill, proprioception of core, proximal hip and LE for self care, mobility, lifting, and ambulation      Manual Treatments:  PROM / STM / Oscillations-Mobs:  G-I, II, III, IV (PA's, Inf., Post.) [x] (54841) Provided manual therapy to mobilize proximal hip and LS spine soft tissue/joints for the purpose of modulating pain, promoting relaxation,  increasing ROM, reducing/eliminating soft tissue swelling/inflammation/restriction, improving soft tissue extensibility and allowing for proper ROM for normal function with self care, mobility, lifting and ambulation. Modalities:       Charges:  Timed Code Treatment Minutes: 45   Total Treatment Minutes: 45     [] EVAL (LOW) 68971 (typically 20 minutes face-to-face)  [] EVAL (MOD) 40785 (typically 30 minutes face-to-face)  [] EVAL (HIGH) 85768 (typically 45 minutes face-to-face)  [] RE-EVAL     [x] JI(66072) x  2   [] DRY NEEDLE 1 OR 2 MUSCLES  [] NMR (49827) x     [] DRY NEEDLE 3+ MUSCLES  [x] Manual (75069) x  1     [] TA (66251) x     [] Mech Traction (16745)  [] ES(attended) (84166)     [] ES (un) (45727):   [] VASO (68518)  [] Other:    If [facility] Please Indicate Time In/Out  CPT Code Time in Time out                                     GOALS:  Patient stated goal: decrease pain  [] Progressing: [] Met: [] Not Met: [] Adjusted  Therapist goals for Patient:   Short Term Goals: To be achieved in: 2 weeks  1. Independent in HEP and progression per patient tolerance, in order to prevent re-injury. [] Progressing: [] Met: [] Not Met: [] Adjusted  2. Patient will have a decrease in pain to facilitate improvement in movement, function, and ADLs as indicated by Functional Deficits. [] Progressing: [] Met: [] Not Met: [] Adjusted    Long Term Goals: To be achieved in: 4 weeks  1. Disability index score of 10% or less for the HERVE to assist with reaching prior level of function. [] Progressing: [] Met: [] Not Met: [] Adjusted  2. Patient will demonstrate increased AROM to WNL, good LS mobility, good hip ROM to allow for proper joint functioning as indicated by patients Functional Deficits.    [] Progressing: [] Met: [] Not Met: [] Adjusted 3. Patient will demonstrate an increase in Strength to good proximal hip and core activation to allow for proper functional mobility as indicated by patients Functional Deficits. [] Progressing: [] Met: [] Not Met: [] Adjusted  4. Patient will return to walking, standing, sitting functional activities without increased symptoms or restriction. [] Progressing: [] Met: [] Not Met: [] Adjusted  5. Patient will report being able to walk dog without increased symptoms or restriction. (patient specific functional goal)    [] Progressing: [] Met: [] Not Met: [] Adjusted     ASSESSMENT:  Doing well with hip mobility and soft tissue work around low back. Work on longer strides and proximal hip stability without use of wrists. Treatment/Activity Tolerance:  [x] Patient tolerated treatment well [] Patient limited by fatique  [] Patient limited by pain  [] Patient limited by other medical complications  [] Other:     Overall Progression Towards Functional goals/ Treatment Progress Update:  [] Patient is progressing as expected towards functional goals listed. [] Progression is slowed due to complexities/Impairments listed. [] Progression has been slowed due to co-morbidities. [x] Plan just implemented, too soon to assess goals progression <30days   [] Goals require adjustment due to lack of progress  [] Patient is not progressing as expected and requires additional follow up with physician  [] Other:    Prognosis for POC: [x] Good [] Fair  [] Poor    Patient requires continued skilled intervention: [x] Yes  [] No        PLAN: See eval  [x] Continue per plan of care [] Alter current plan (see comments)  [] Plan of care initiated [] Hold pending MD visit [] Discharge    Electronically signed by: Catherine Mccollum PT    Note: If patient does not return for scheduled/recommended follow up visits, this note will serve as a discharge from care along with the most recent update on progress.

## 2021-03-08 ENCOUNTER — HOSPITAL ENCOUNTER (OUTPATIENT)
Dept: PHYSICAL THERAPY | Age: 66
Setting detail: THERAPIES SERIES
Discharge: HOME | End: 2021-03-08
Payer: MEDICARE

## 2021-03-08 PROCEDURE — 97110 THERAPEUTIC EXERCISES: CPT

## 2021-03-08 PROCEDURE — 97140 MANUAL THERAPY 1/> REGIONS: CPT

## 2021-03-08 NOTE — FLOWSHEET NOTE
Min: 20 min      DN      Prone PA 1 10    GISTM/STM  4    Lumbar Manip      SI Manip      Hip  Mobs/ROM 1 6 Prone and with belt- B hips   Hip LA distraction            NMR re-education (31869)   Min:      Mf Activation- re-ed      TrA Re-ed activation      Glute Max re-ed activation      Prone aruna Anderson            Therapeutic Activity (03535) Min:                                Therapeutic Exercise and NMR EXR  [x] (63143) Provided verbal/tactile cueing for activities related to strengthening, flexibility, endurance, ROM  for improvements in proximal hip and core control with self care, mobility, lifting and ambulation. [x] (08878) Provided verbal/tactile cueing for activities related to improving balance, coordination, kinesthetic sense, posture, motor skill, proprioception  to assist with core control in self care, mobility, lifting, and ambulation.      Therapeutic Activities:    [] (44020 or 59990) Provided verbal/tactile cueing for activities related to improving balance, coordination, kinesthetic sense, posture, motor skill, proprioception and motor activation to allow for proper function  with self care and ADLs  [] (77458) Provided training and instruction to the patient for proper core and proximal hip recruitment and positioning with ambulation re-education     Home Exercise Program:    [x] (23269) Reviewed/Progressed HEP activities related to strengthening, flexibility, endurance, ROM of core, proximal hip and LE for functional self-care, mobility, lifting and ambulation   [] (64254) Reviewed/Progressed HEP activities related to improving balance, coordination, kinesthetic sense, posture, motor skill, proprioception of core, proximal hip and LE for self care, mobility, lifting, and ambulation      Manual Treatments:  PROM / STM / Oscillations-Mobs:  G-I, II, III, IV (PA's, Inf., Post.)  [x] (80877) Provided manual therapy to mobilize proximal hip and LS spine soft tissue/joints for the purpose of modulating pain, promoting relaxation,  increasing ROM, reducing/eliminating soft tissue swelling/inflammation/restriction, improving soft tissue extensibility and allowing for proper ROM for normal function with self care, mobility, lifting and ambulation. Modalities:       Charges:  Timed Code Treatment Minutes: 45   Total Treatment Minutes: 45     [] EVAL (LOW) 42629 (typically 20 minutes face-to-face)  [] EVAL (MOD) 41663 (typically 30 minutes face-to-face)  [] EVAL (HIGH) 10498 (typically 45 minutes face-to-face)  [] RE-EVAL     [x] FI(72535) x  2   [] DRY NEEDLE 1 OR 2 MUSCLES  [] NMR (90860) x     [] DRY NEEDLE 3+ MUSCLES  [x] Manual (29515) x  1     [] TA (86274) x     [] Mech Traction (35370)  [] ES(attended) (28754)     [] ES (un) (54991):   [] VASO (80496)  [] Other:    If [facility] Please Indicate Time In/Out  CPT Code Time in Time out                                     GOALS:  Patient stated goal: decrease pain  [] Progressing: [] Met: [] Not Met: [] Adjusted  Therapist goals for Patient:   Short Term Goals: To be achieved in: 2 weeks  1. Independent in HEP and progression per patient tolerance, in order to prevent re-injury. [] Progressing: [] Met: [] Not Met: [] Adjusted  2. Patient will have a decrease in pain to facilitate improvement in movement, function, and ADLs as indicated by Functional Deficits. [] Progressing: [] Met: [] Not Met: [] Adjusted    Long Term Goals: To be achieved in: 4 weeks  1. Disability index score of 10% or less for the HERVE to assist with reaching prior level of function. [] Progressing: [] Met: [] Not Met: [] Adjusted  2. Patient will demonstrate increased AROM to WNL, good LS mobility, good hip ROM to allow for proper joint functioning as indicated by patients Functional Deficits. [] Progressing: [] Met: [] Not Met: [] Adjusted  3.  Patient will demonstrate an increase in Strength to good proximal hip and core activation to allow for proper functional mobility as indicated by patients Functional Deficits. [] Progressing: [] Met: [] Not Met: [] Adjusted  4. Patient will return to walking, standing, sitting functional activities without increased symptoms or restriction. [] Progressing: [] Met: [] Not Met: [] Adjusted  5. Patient will report being able to walk dog without increased symptoms or restriction. (patient specific functional goal)    [] Progressing: [] Met: [] Not Met: [] Adjusted     ASSESSMENT:  Doing well with hip mobility and soft tissue work around low back. Work on longer strides and proximal hip stability without use of wrists. Feeling improved with stride length. Still struggling with balance, especially limited with standing on L leg. Treatment/Activity Tolerance:  [x] Patient tolerated treatment well [] Patient limited by fatique  [] Patient limited by pain  [] Patient limited by other medical complications  [] Other:     Overall Progression Towards Functional goals/ Treatment Progress Update:  [] Patient is progressing as expected towards functional goals listed. [] Progression is slowed due to complexities/Impairments listed. [] Progression has been slowed due to co-morbidities. [x] Plan just implemented, too soon to assess goals progression <30days   [] Goals require adjustment due to lack of progress  [] Patient is not progressing as expected and requires additional follow up with physician  [] Other:    Prognosis for POC: [x] Good [] Fair  [] Poor    Patient requires continued skilled intervention: [x] Yes  [] No        PLAN: See eval  [x] Continue per plan of care [] Alter current plan (see comments)  [] Plan of care initiated [] Hold pending MD visit [] Discharge    Electronically signed by: Kelvin Grigsby PT    Note: If patient does not return for scheduled/recommended follow up visits, this note will serve as a discharge from care along with the most recent update on progress.

## 2021-03-10 ENCOUNTER — HOSPITAL ENCOUNTER (OUTPATIENT)
Dept: PHYSICAL THERAPY | Age: 66
Setting detail: THERAPIES SERIES
Discharge: HOME | End: 2021-03-10
Payer: MEDICARE

## 2021-03-10 PROCEDURE — 97110 THERAPEUTIC EXERCISES: CPT

## 2021-03-10 PROCEDURE — 97140 MANUAL THERAPY 1/> REGIONS: CPT

## 2021-03-10 NOTE — FLOWSHEET NOTE
[address] [place] [address]  Phone: [phone]   Fax:     [phone]    Physical Therapy Treatment Note/ Progress Report:     Date:  3/10/2021    Patient Name:  Mikaela Rodriguez    :  1955  MRN: [id number]  Restrictions/Precautions:    Medical/Treatment Diagnosis Information:  · Diagnosis: lumbar pain, lumbar HNP  · Treatment Diagnosis: low back pain M54.5, lumbar HNP I07.22  Insurance/Certification information:  PT Insurance Information: [employer]/[facility]  Physician Information:  Referring Practitioner: Dr Jyothi Munoz of care signed (Y/N):     Date of Patient follow up with Physician:      Progress Report: [x]  Yes  []  No     Date Range for reporting period:  Beginnin2021  Ending: -    Progress report due (10 Rx/or 30 days whichever is less):      Recertification due (POC duration/ or 90 days whichever is less): 3/21/2021     Visit # Insurance Allowable Auth Needed   6 Medicare/[facility] (medicare guidelines) []Yes    [x]No     Pain level:  4/10   Functional Scale: HERVE 20% disability   Date Assessed: 2021    SUBJECTIVE:  Patient reports that he felt great after last session but tightens back up again. Did more walking yesterday. Thinks some of stiffness and soreness is weather related.       OBJECTIVE:    Observation:    Test measurements:      RESTRICTIONS/PRECAUTIONS: cervical fusion    Exercises/Interventions:     Therapeutic Ex (09178)   Min: 15 min sets/sec reps notes   Hip Ext      piriformis stretch bilat 30 3    SKTC stretch bilat 30 3    Side lying LB rot 10 10 bilat   Front Plank      Side planks       Kneeling hip abd/ext      1/2 kneeling down chop   Red band   Wall march  10    Standing hip abd  10    Standing hip ext  10    Lateral band walk      Bosu Lunge      Slide lunge       Ham string stretch  30 stool   Hip Flex stretch  Glute Stretch      SLS Ball/wall glute      Manual Intervention (30346) Min: 20 min      DN      Prone PA 1 10    GISTM/STM  4    Lumbar Manip      SI Manip      Hip  Mobs/ROM 1 6 Prone and with belt- B hips   Hip LA distraction            NMR re-education (67807)   Min:      Mf Activation- re-ed      TrA Re-ed activation      Glute Max re-ed activation      Prone aruna Anderson            Therapeutic Activity (53740) Min:                                Access Code: [email or web address]   URL: [email or web address]   Date: 03/10/2021   Prepared by: Ayaka Otero     Exercises   Supine Lower Trunk Rotation - 10 reps - 2 sets - 1x daily - 7x weekly   Supine March - 10 reps - 2 sets - 1x daily - 7x weekly   Bent Knee Fallouts with Alternating Legs - 10 reps - 2 sets - 1x daily - 7x weekly   Standing March with Counter Support - 10 reps - 2 sets - 1x daily - 7x weekly   Standing Hip Abduction - 10 reps - 2 sets - 1x daily - 7x weekly       Therapeutic Exercise and NMR EXR  [x] (54489) Provided verbal/tactile cueing for activities related to strengthening, flexibility, endurance, ROM  for improvements in proximal hip and core control with self care, mobility, lifting and ambulation. [x] (34667) Provided verbal/tactile cueing for activities related to improving balance, coordination, kinesthetic sense, posture, motor skill, proprioception  to assist with core control in self care, mobility, lifting, and ambulation.      Therapeutic Activities:    [] (82966 or 20657) Provided verbal/tactile cueing for activities related to improving balance, coordination, kinesthetic sense, posture, motor skill, proprioception and motor activation to allow for proper function  with self care and ADLs  [] (35347) Provided training and instruction to the patient for proper core and proximal hip recruitment and positioning with ambulation re-education     Home Exercise Program:    [x] (13435) Reviewed/Progressed HEP activities related to strengthening, flexibility, endurance, ROM of core, proximal hip and LE for functional self-care, mobility, lifting and ambulation   [] (00735) Reviewed/Progressed HEP activities related to improving balance, coordination, kinesthetic sense, posture, motor skill, proprioception of core, proximal hip and LE for self care, mobility, lifting, and ambulation      Manual Treatments:  PROM / STM / Oscillations-Mobs:  G-I, II, III, IV (PA's, Inf., Post.)  [x] (27616) Provided manual therapy to mobilize proximal hip and LS spine soft tissue/joints for the purpose of modulating pain, promoting relaxation,  increasing ROM, reducing/eliminating soft tissue swelling/inflammation/restriction, improving soft tissue extensibility and allowing for proper ROM for normal function with self care, mobility, lifting and ambulation. Modalities:       Charges:  Timed Code Treatment Minutes: 35   Total Treatment Minutes: 35     [] EVAL (LOW) 74288 (typically 20 minutes face-to-face)  [] EVAL (MOD) 47076 (typically 30 minutes face-to-face)  [] EVAL (HIGH) 99135 (typically 45 minutes face-to-face)  [] RE-EVAL     [x] UQ(59915) x  1   [] DRY NEEDLE 1 OR 2 MUSCLES  [] NMR (61284) x     [] DRY NEEDLE 3+ MUSCLES  [x] Manual (68846) x  1     [] TA (02751) x     [] Mech Traction (35825)  [] ES(attended) (54441)     [] ES (un) (60064):   [] VASO (92690)  [] Other:    If [facility] Please Indicate Time In/Out  CPT Code Time in Time out                                     GOALS:  Patient stated goal: decrease pain  [] Progressing: [] Met: [] Not Met: [] Adjusted  Therapist goals for Patient:   Short Term Goals: To be achieved in: 2 weeks  1. Independent in HEP and progression per patient tolerance, in order to prevent re-injury. [] Progressing: [] Met: [] Not Met: [] Adjusted  2. Patient will have a decrease in pain to facilitate improvement in movement, function, and ADLs as indicated by Functional Deficits. [] Progressing: [] Met: [] Not Met: [] Adjusted    Long Term Goals: To be achieved in: 4 weeks  1. Disability index score of 10% or less for the HERVE to assist with reaching prior level of function. [] Progressing: [] Met: [] Not Met: [] Adjusted  2. Patient will demonstrate increased AROM to WNL, good LS mobility, good hip ROM to allow for proper joint functioning as indicated by patients Functional Deficits. [] Progressing: [] Met: [] Not Met: [] Adjusted  3. Patient will demonstrate an increase in Strength to good proximal hip and core activation to allow for proper functional mobility as indicated by patients Functional Deficits. [] Progressing: [] Met: [] Not Met: [] Adjusted  4. Patient will return to walking, standing, sitting functional activities without increased symptoms or restriction. [] Progressing: [] Met: [] Not Met: [] Adjusted  5. Patient will report being able to walk dog without increased symptoms or restriction. (patient specific functional goal)    [] Progressing: [] Met: [] Not Met: [] Adjusted     ASSESSMENT:  Doing well with hip mobility and soft tissue work around low back. Work on longer strides and proximal hip stability without use of wrists. Needs to be more consistent with mobility exercises at home. Treatment/Activity Tolerance:  [x] Patient tolerated treatment well [] Patient limited by fatique  [] Patient limited by pain  [] Patient limited by other medical complications  [] Other:     Overall Progression Towards Functional goals/ Treatment Progress Update:  [x] Patient is progressing as expected towards functional goals listed. [] Progression is slowed due to complexities/Impairments listed. [] Progression has been slowed due to co-morbidities.   [] Plan just implemented, too soon to assess goals progression <30days   [] Goals require adjustment due to lack of progress  [] Patient is not progressing as expected and requires additional follow up with physician  [] Other:    Prognosis for POC: [x] Good [] Fair  [] Poor    Patient requires continued skilled intervention: [x] Yes  [] No        PLAN: Continue mobility, endurance of proximal hip for walking. [x] Continue per plan of care [] Alter current plan (see comments)  [] Plan of care initiated [] Hold pending MD visit [] Discharge    Electronically signed by: Rob Ríos PT    Note: If patient does not return for scheduled/recommended follow up visits, this note will serve as a discharge from care along with the most recent update on progress.

## 2021-03-12 ENCOUNTER — HOSPITAL ENCOUNTER (OUTPATIENT)
Dept: PHYSICAL THERAPY | Age: 66
Setting detail: THERAPIES SERIES
Discharge: HOME | End: 2021-03-12
Payer: MEDICARE

## 2021-03-12 PROCEDURE — 97140 MANUAL THERAPY 1/> REGIONS: CPT

## 2021-03-12 PROCEDURE — 97110 THERAPEUTIC EXERCISES: CPT

## 2021-03-12 NOTE — FLOWSHEET NOTE
[address] [place] [address]  Phone: [phone]   Fax:     [phone]    Physical Therapy Treatment Note/ Progress Report:     Date:  3/12/2021    Patient Name:  Milvia Shetty    :  1955  MRN: [id number]  Restrictions/Precautions:    Medical/Treatment Diagnosis Information:  · Diagnosis: lumbar pain, lumbar HNP  · Treatment Diagnosis: low back pain M54.5, lumbar HNP V80.86  Insurance/Certification information:  PT Insurance Information: [employer]/[facility]  Physician Information:  Referring Practitioner: Dr Tyson Fall of care signed (Y/N):     Date of Patient follow up with Physician:      Progress Report: [x]  Yes  []  No     Date Range for reporting period:  Beginnin2021  Ending: -    Progress report due (10 Rx/or 30 days whichever is less): 1241     Recertification due (POC duration/ or 90 days whichever is less): 3/21/2021     Visit # Insurance Allowable Auth Needed   7 Medicare/[facility] (medicare guidelines) []Yes    [x]No     Pain level:  4/10   Functional Scale: HERVE 20% disability   Date Assessed: 2021    SUBJECTIVE:  Patient reports that he felt great after last session but tightens back up again. Did more walking yesterday. Thinks some of stiffness and soreness is weather related.       OBJECTIVE:    Observation:    Test measurements:      RESTRICTIONS/PRECAUTIONS: cervical fusion    Exercises/Interventions:     Therapeutic Ex (12864)   Min: 15 min sets/sec reps notes   glute max- prone 10 10    piriformis stretch bilat 30 3    SKTC stretch bilat 30 3    Side lying LB rot 10 10 bilat   Front Plank      Standing band pull down  15 Red, at forearm   Standing band lateral pull  15 Red, at forearm   1/2 kneeling down chop   Red band   Wall march  10    Standing hip abd  10    Standing hip ext  10    Lateral band walk      Bosu Lunge      Slide lunge       Ham string stretch  30 stool   Hip Flex stretch  Glute strengthening, flexibility, endurance, ROM of core, proximal hip and LE for functional self-care, mobility, lifting and ambulation   [] (35123) Reviewed/Progressed HEP activities related to improving balance, coordination, kinesthetic sense, posture, motor skill, proprioception of core, proximal hip and LE for self care, mobility, lifting, and ambulation      Manual Treatments:  PROM / STM / Oscillations-Mobs:  G-I, II, III, IV (PA's, Inf., Post.)  [x] (03768) Provided manual therapy to mobilize proximal hip and LS spine soft tissue/joints for the purpose of modulating pain, promoting relaxation,  increasing ROM, reducing/eliminating soft tissue swelling/inflammation/restriction, improving soft tissue extensibility and allowing for proper ROM for normal function with self care, mobility, lifting and ambulation. Modalities:       Charges:  Timed Code Treatment Minutes: 35   Total Treatment Minutes: 35     [] EVAL (LOW) 48307 (typically 20 minutes face-to-face)  [] EVAL (MOD) 63288 (typically 30 minutes face-to-face)  [] EVAL (HIGH) 46198 (typically 45 minutes face-to-face)  [] RE-EVAL     [x] IO(93845) x  1   [] DRY NEEDLE 1 OR 2 MUSCLES  [] NMR (96114) x     [] DRY NEEDLE 3+ MUSCLES  [x] Manual (10025) x  1     [] TA (24274) x     [] Mech Traction (85893)  [] ES(attended) (54657)     [] ES (un) (31577):   [] VASO (58231)  [] Other:    If [facility] Please Indicate Time In/Out  CPT Code Time in Time out                                     GOALS:  Patient stated goal: decrease pain  [] Progressing: [] Met: [] Not Met: [] Adjusted  Therapist goals for Patient:   Short Term Goals: To be achieved in: 2 weeks  1. Independent in HEP and progression per patient tolerance, in order to prevent re-injury. [] Progressing: [] Met: [] Not Met: [] Adjusted  2. Patient will have a decrease in pain to facilitate improvement in movement, function, and ADLs as indicated by Functional Deficits.   [] Progressing: [] Met: [] Not Met: [] Adjusted    Long Term Goals: To be achieved in: 4 weeks  1. Disability index score of 10% or less for the HERVE to assist with reaching prior level of function. [] Progressing: [] Met: [] Not Met: [] Adjusted  2. Patient will demonstrate increased AROM to WNL, good LS mobility, good hip ROM to allow for proper joint functioning as indicated by patients Functional Deficits. [] Progressing: [] Met: [] Not Met: [] Adjusted  3. Patient will demonstrate an increase in Strength to good proximal hip and core activation to allow for proper functional mobility as indicated by patients Functional Deficits. [] Progressing: [] Met: [] Not Met: [] Adjusted  4. Patient will return to walking, standing, sitting functional activities without increased symptoms or restriction. [] Progressing: [] Met: [] Not Met: [] Adjusted  5. Patient will report being able to walk dog without increased symptoms or restriction. (patient specific functional goal)    [] Progressing: [] Met: [] Not Met: [] Adjusted     ASSESSMENT:  Doing well with hip mobility and soft tissue work around low back. Work on longer strides and proximal hip stability without use of wrists. Needs to be more consistent with mobility exercises at home. Treatment/Activity Tolerance:  [x] Patient tolerated treatment well [] Patient limited by fatique  [] Patient limited by pain  [] Patient limited by other medical complications  [] Other:     Overall Progression Towards Functional goals/ Treatment Progress Update:  [x] Patient is progressing as expected towards functional goals listed. [] Progression is slowed due to complexities/Impairments listed. [] Progression has been slowed due to co-morbidities.   [] Plan just implemented, too soon to assess goals progression <30days   [] Goals require adjustment due to lack of progress  [] Patient is not progressing as expected and requires additional follow up with physician  [] Other:    Prognosis for POC: [x] Good []

## 2021-03-15 ENCOUNTER — HOSPITAL ENCOUNTER (OUTPATIENT)
Dept: PHYSICAL THERAPY | Age: 66
Setting detail: THERAPIES SERIES
Discharge: HOME | End: 2021-03-15
Payer: MEDICARE

## 2021-03-15 PROCEDURE — 97110 THERAPEUTIC EXERCISES: CPT

## 2021-03-15 PROCEDURE — 97140 MANUAL THERAPY 1/> REGIONS: CPT

## 2021-03-15 NOTE — FLOWSHEET NOTE
[address] [place] [address]  Phone: [phone]   Fax:     [phone]    Physical Therapy Treatment Note/ Progress Report:     Date:  3/15/2021    Patient Name:  Dai Zuleta    :  1955  MRN: [id number]  Restrictions/Precautions:    Medical/Treatment Diagnosis Information:  · Diagnosis: lumbar pain, lumbar HNP  · Treatment Diagnosis: low back pain M54.5, lumbar HNP N54.73  Insurance/Certification information:  PT Insurance Information: [place]/[facility]  Physician Information:  Referring Practitioner: Dr Candy Roberto of care signed (Y/N):     Date of Patient follow up with Physician:      Progress Report: [x]  Yes  []  No     Date Range for reporting period:  Beginnin2021  Ending: -    Progress report due (10 Rx/or 30 days whichever is less): 4779     Recertification due (POC duration/ or 90 days whichever is less): 3/21/2021     Visit # Insurance Allowable Auth Needed   8 Medicare/[facility] (medicare guidelines) []Yes    [x]No     Pain level:  4/10   Functional Scale: HERVE 20% disability   Date Assessed: 2021    SUBJECTIVE:  Walking better overall, feels mid back tightness.       OBJECTIVE:    Observation:    Test measurements:      RESTRICTIONS/PRECAUTIONS: cervical fusion    Exercises/Interventions:     Therapeutic Ex (33310)   Min: 15 min sets/sec reps notes   glute max- prone 10 10    piriformis stretch bilat 30 3    SKTC stretch bilat 30 3    Side lying LB rot 10 10 bilat   Front Plank      Standing band pull down  15 Red, at forearm   Standing band lateral pull  15 Red, at forearm   1/2 kneeling down chop   Red band   Wall march  10    Standing hip abd  10    Standing hip ext  10    Lateral band walk      Bosu Lunge      Slide lunge       Ham string stretch  30 stool   Hip Flex stretch  Glute Stretch      SLS Ball/wall glute      Manual Intervention (63713) Min: 20 min      DN      Prone PA 1 10    GISTM/STM 4    Lumbar Manip      SI Manip      Hip  Mobs/ROM 1 6 Prone and with belt- B hips   Hip LA distraction            NMR re-education (21648)   Min:      Mf Activation- re-ed      TrA Re-ed activation      Glute Max re-ed activation      Prone aruna Anderson            Therapeutic Activity (57788) Min:                                Access Code: [email or web address]   URL: [email or web address]   Date: 03/10/2021   Prepared by: Tanvir Core     Exercises   Supine Lower Trunk Rotation - 10 reps - 2 sets - 1x daily - 7x weekly   Supine March - 10 reps - 2 sets - 1x daily - 7x weekly   Bent Knee Fallouts with Alternating Legs - 10 reps - 2 sets - 1x daily - 7x weekly   Standing March with Counter Support - 10 reps - 2 sets - 1x daily - 7x weekly   Standing Hip Abduction - 10 reps - 2 sets - 1x daily - 7x weekly       Therapeutic Exercise and NMR EXR  [x] (74354) Provided verbal/tactile cueing for activities related to strengthening, flexibility, endurance, ROM  for improvements in proximal hip and core control with self care, mobility, lifting and ambulation. [x] (99488) Provided verbal/tactile cueing for activities related to improving balance, coordination, kinesthetic sense, posture, motor skill, proprioception  to assist with core control in self care, mobility, lifting, and ambulation.      Therapeutic Activities:    [] (89319 or 85675) Provided verbal/tactile cueing for activities related to improving balance, coordination, kinesthetic sense, posture, motor skill, proprioception and motor activation to allow for proper function  with self care and ADLs  [] (85941) Provided training and instruction to the patient for proper core and proximal hip recruitment and positioning with ambulation re-education     Home Exercise Program:    [x] (59502) Reviewed/Progressed HEP activities related to strengthening, flexibility, endurance, ROM of core, proximal hip and LE for functional self-care, mobility, lifting and ambulation   [] (25628) Reviewed/Progressed HEP activities related to improving balance, coordination, kinesthetic sense, posture, motor skill, proprioception of core, proximal hip and LE for self care, mobility, lifting, and ambulation      Manual Treatments:  PROM / STM / Oscillations-Mobs:  G-I, II, III, IV (PA's, Inf., Post.)  [x] (55887) Provided manual therapy to mobilize proximal hip and LS spine soft tissue/joints for the purpose of modulating pain, promoting relaxation,  increasing ROM, reducing/eliminating soft tissue swelling/inflammation/restriction, improving soft tissue extensibility and allowing for proper ROM for normal function with self care, mobility, lifting and ambulation. Modalities:       Charges:  Timed Code Treatment Minutes: 35   Total Treatment Minutes: 35     [] EVAL (LOW) 45256 (typically 20 minutes face-to-face)  [] EVAL (MOD) 71707 (typically 30 minutes face-to-face)  [] EVAL (HIGH) 07471 (typically 45 minutes face-to-face)  [] RE-EVAL     [x] VV(75673) x  1   [] DRY NEEDLE 1 OR 2 MUSCLES  [] NMR (29842) x     [] DRY NEEDLE 3+ MUSCLES  [x] Manual (93663) x  1     [] TA (44410) x     [] Mech Traction (69855)  [] ES(attended) (33543)     [] ES (un) (67261):   [] VASO (96090)  [] Other:    If BWC Please Indicate Time In/Out  CPT Code Time in Time out                                     GOALS:  Patient stated goal: decrease pain  [] Progressing: [] Met: [] Not Met: [] Adjusted  Therapist goals for Patient:   Short Term Goals: To be achieved in: 2 weeks  1. Independent in HEP and progression per patient tolerance, in order to prevent re-injury. [] Progressing: [] Met: [] Not Met: [] Adjusted  2. Patient will have a decrease in pain to facilitate improvement in movement, function, and ADLs as indicated by Functional Deficits. [] Progressing: [] Met: [] Not Met: [] Adjusted    Long Term Goals: To be achieved in: 4 weeks  1.  Disability index score of 10% or less for the HERVE to assist with reaching prior level of function. [] Progressing: [] Met: [] Not Met: [] Adjusted  2. Patient will demonstrate increased AROM to WNL, good LS mobility, good hip ROM to allow for proper joint functioning as indicated by patients Functional Deficits. [] Progressing: [] Met: [] Not Met: [] Adjusted  3. Patient will demonstrate an increase in Strength to good proximal hip and core activation to allow for proper functional mobility as indicated by patients Functional Deficits. [] Progressing: [] Met: [] Not Met: [] Adjusted  4. Patient will return to walking, standing, sitting functional activities without increased symptoms or restriction. [] Progressing: [] Met: [] Not Met: [] Adjusted  5. Patient will report being able to walk dog without increased symptoms or restriction. (patient specific functional goal)    [] Progressing: [] Met: [] Not Met: [] Adjusted     ASSESSMENT:  Doing well with hip mobility and soft tissue work around low and mid back. Treatment/Activity Tolerance:   [x] Patient tolerated treatment well [] Patient limited by fatique  [] Patient limited by pain  [] Patient limited by other medical complications  [] Other:     Overall Progression Towards Functional goals/ Treatment Progress Update:  [x] Patient is progressing as expected towards functional goals listed. [] Progression is slowed due to complexities/Impairments listed. [] Progression has been slowed due to co-morbidities. [] Plan just implemented, too soon to assess goals progression <30days   [] Goals require adjustment due to lack of progress  [] Patient is not progressing as expected and requires additional follow up with physician  [] Other:    Prognosis for POC: [x] Good [] Fair  [] Poor    Patient requires continued skilled intervention: [x] Yes  [] No        PLAN: Continue mobility, endurance of proximal hip for walking.    [x] Continue per plan of care [] Alter current plan (see comments)  [] Plan of care initiated [] Hold pending MD visit [] Discharge    Electronically signed by: Dorota Hays PT    Note: If patient does not return for scheduled/recommended follow up visits, this note will serve as a discharge from care along with the most recent update on progress.

## 2021-03-17 ENCOUNTER — HOSPITAL ENCOUNTER (OUTPATIENT)
Dept: PHYSICAL THERAPY | Age: 66
Setting detail: THERAPIES SERIES
Discharge: HOME | End: 2021-03-17
Payer: MEDICARE

## 2021-03-17 PROCEDURE — 97110 THERAPEUTIC EXERCISES: CPT

## 2021-03-17 PROCEDURE — 97140 MANUAL THERAPY 1/> REGIONS: CPT

## 2021-03-17 NOTE — PLAN OF CARE
Elis Onofre  Phone: [phone]   Fax:     [phone]        Physical Therapy Re-Certification Plan of Care/MD UPDATE      Dear Referring Practitioner: Bethany Crawford,    We had the pleasure of treating the following patient for physical therapy services at [address] [place] [address]. A summary of our findings can be found in the updated assessment below. This includes our plan of care. If you have any questions or concerns regarding these findings, please do not hesitate to contact me at the office phone number checked above.   Thank you for the referral.     Physician Signature:________________________________Date:__________________  By signing above (or electronic signature), therapists plan is approved by physician    Date Range Of Visits: 2021 thru 3/17/2021  Total Visits to Date: 9  Overall Response to Treatment:   [x]Patient is responding well to treatment and improvement is noted with regards  to goals   []Patient should continue to improve in reasonable time if they continue HEP   []Patient has plateaued and is no longer responding to skilled PT intervention    []Patient is getting worse and would benefit from return to referring MD   []Patient unable to adhere to initial POC   []Other:     Physical Therapy Treatment Note/ Progress Report:     Date:  3/17/2021    Patient Name:  Miladys Robertson    :  1955  MRN: [id number]  Restrictions/Precautions:    Medical/Treatment Diagnosis Information:  · Diagnosis: lumbar pain, lumbar HNP  · Treatment Diagnosis: low back pain M54.5, lumbar HNP C16.46  Insurance/Certification information:  PT Insurance Information: [facility]  Physician Information:  Referring Practitioner: Dr Titi Herrera of care signed (Y/N):     Date of Patient follow up with Physician:      Progress Report: [x]  Yes  []  No     Date Range for reporting period:  Beginning: 2/22/2021  Ending: 3/17/2021    Progress report due (10 Rx/or 30 days whichever is less): 6/78/2304     Recertification due (POC duration/ or 90 days whichever is less): 4/17/2021    Visit # Insurance Allowable Auth Needed   9 Medicare/[employer] (medicare guidelines) []Yes    [x]No     Pain level:  4/10   Functional Scale: HERVE 10% disability   Date Assessed: 3/17/2021    SUBJECTIVE:  Walking better overall, feels mid back tightness. States that he has been really stiff and sore the last day due to the weather; but overall feeling decent today. Reports feeling at least 50% improved since onset of therapy. OBJECTIVE:    Observation:    Test measurements:      ROM   Comments   Lumbar Flex toes     Lumbar Ext fair        ROM LEFT RIGHT Comments   Lumbar Side Bend Feels pulling on L  Mid thigh bilaterally   Lumbar Rotation limited WNL L>R   Quadrant         Hip Flexion 108 105     Hip Abd         Hip ER 55 55     Hip IR 25 12     Hip Extension         Knee Ext         Knee Flex         Hamstring Flex Limited bilaterally- 60 Limited bilaterally- 50    Piriformis limited limited     Renny test                       Myotomes/Strength Normal Abnormal Comments   [x]? ALL NORMAL         Hip Abd     R 4/5, L 4-/5   Hip Ext     bilat 3+/5   Hip flexion (L1-L2 femoral) []?  []?  bilat 4/5   Knee extension (L2-L4 femoral) []?  []?      Knee flexion (S1 sciatic)         Dorsiflexion (L4-L5 deep peroneal) []?  []?      Great Toe Ext (L5 deep peroneal nerve) []?  []?      Ankle Eversion (S1-S2 super peroneal) []?  []?      Ankle PF(S1-S2 tibial) []?  []?      Multifidus []?  []?      Transverse Ab []?  []?          RESTRICTIONS/PRECAUTIONS: cervical fusion    Exercises/Interventions:     Therapeutic Ex (83425)   Min: 15 min sets/sec reps notes   glute max- prone 10 10    piriformis stretch bilat      SKTC stretch bilat      Side lying LB rot 10 10 bilat   LTR 2 10    Standing band pull down  15 Red, at forearm   Standing band self care and ADLs  [] (58367) Provided training and instruction to the patient for proper core and proximal hip recruitment and positioning with ambulation re-education     Home Exercise Program:    [x] (34546) Reviewed/Progressed HEP activities related to strengthening, flexibility, endurance, ROM of core, proximal hip and LE for functional self-care, mobility, lifting and ambulation   [] (24652) Reviewed/Progressed HEP activities related to improving balance, coordination, kinesthetic sense, posture, motor skill, proprioception of core, proximal hip and LE for self care, mobility, lifting, and ambulation      Manual Treatments:  PROM / STM / Oscillations-Mobs:  G-I, II, III, IV (PA's, Inf., Post.)  [x] (81498) Provided manual therapy to mobilize proximal hip and LS spine soft tissue/joints for the purpose of modulating pain, promoting relaxation,  increasing ROM, reducing/eliminating soft tissue swelling/inflammation/restriction, improving soft tissue extensibility and allowing for proper ROM for normal function with self care, mobility, lifting and ambulation. Modalities:       Charges:  Timed Code Treatment Minutes: 36   Total Treatment Minutes: 36     [] EVAL (LOW) 63743 (typically 20 minutes face-to-face)  [] EVAL (MOD) 23911 (typically 30 minutes face-to-face)  [] EVAL (HIGH) 81760 (typically 45 minutes face-to-face)  [] RE-EVAL     [x] JM(59627) x  1   [] DRY NEEDLE 1 OR 2 MUSCLES  [] NMR (72553) x     [] DRY NEEDLE 3+ MUSCLES  [x] Manual (88676) x  1     [] TA (98261) x     [] [facility] (11821[facility]  [] ES(attended) (88548)     [] ES (un) (16610):   [] VASO (18852)  [] Other:    If BWC Please Indicate Time In/Out  CPT Code Time in Time out                                     GOALS:  Patient stated goal: decrease pain  [x] Progressing: [] Met: [] Not Met: [] Adjusted  Therapist goals for Patient:   Short Term Goals: To be achieved in: 2 weeks  1.  Independent in HEP and progression per patient tolerance, in order to prevent re-injury. [] Progressing: [x] Met: [] Not Met: [] Adjusted  2. Patient will have a decrease in pain to facilitate improvement in movement, function, and ADLs as indicated by Functional Deficits. [x] Progressing: [] Met: [] Not Met: [] Adjusted    Long Term Goals: To be achieved in: 4 weeks  1. Disability index score of 10% or less for the HERVE to assist with reaching prior level of function. [] Progressing: [x] Met: [] Not Met: [] Adjusted  2. Patient will demonstrate increased AROM to WNL, good LS mobility, good hip ROM to allow for proper joint functioning as indicated by patients Functional Deficits. [x] Progressing: [] Met: [] Not Met: [] Adjusted  3. Patient will demonstrate an increase in Strength to good proximal hip and core activation to allow for proper functional mobility as indicated by patients Functional Deficits. [x] Progressing: [] Met: [] Not Met: [] Adjusted  4. Patient will return to walking, standing, sitting functional activities without increased symptoms or restriction. [x] Progressing: [] Met: [] Not Met: [] Adjusted  5. Patient will report being able to walk dog without increased symptoms or restriction. (patient specific functional goal)    [x] Progressing: [] Met: [] Not Met: [] Adjusted     ASSESSMENT:  Patient has been seen for 9 visits of physical therapy due to back pain. Patient has been making good progress in lumbar ROM, hip ROM as well as core and proximal hip strength. Patient still needs further strengthening in glute max and med. Patient HERVE improved form 20% to 10% disability. Patient progress limited due to patient having limited use of hands for exercises due to bad wrists/hands. Patient will benefit from further skilled PT services to further address ROM, strength and NM control; as well as further establish HEP.      Treatment/Activity Tolerance:   [x] Patient tolerated treatment well [] Patient limited by fatique  [] Patient limited by pain [] Patient limited by other medical complications  [] Other:     Overall Progression Towards Functional goals/ Treatment Progress Update:  [x] Patient is progressing as expected towards functional goals listed. [] Progression is slowed due to complexities/Impairments listed. [] Progression has been slowed due to co-morbidities. [] Plan just implemented, too soon to assess goals progression <30days   [] Goals require adjustment due to lack of progress  [] Patient is not progressing as expected and requires additional follow up with physician  [] Other:    Prognosis for POC: [x] Good [] Fair  [] Poor    Patient requires continued skilled intervention: [x] Yes  [] No        PLAN: Continue mobility, endurance of proximal hip for walking. [x] Continue per plan of care [] Alter current plan (see comments)  [] Plan of care initiated [] Hold pending MD visit [] Discharge    Electronically signed by: Gamal Das PT    Note: If patient does not return for scheduled/recommended follow up visits, this note will serve as a discharge from care along with the most recent update on progress.

## 2021-03-19 ENCOUNTER — HOSPITAL ENCOUNTER (OUTPATIENT)
Dept: PHYSICAL THERAPY | Age: 66
Setting detail: THERAPIES SERIES
Discharge: HOME | End: 2021-03-19
Payer: MEDICARE

## 2021-03-19 PROCEDURE — 97140 MANUAL THERAPY 1/> REGIONS: CPT

## 2021-03-19 PROCEDURE — 97110 THERAPEUTIC EXERCISES: CPT

## 2021-03-19 NOTE — FLOWSHEET NOTE
Prasad Morin [address]  Phone: [phone]   Fax:     [phone]          Physical Therapy Treatment Note/ Progress Report:     Date:  3/19/2021    Patient Name:  Adis Pleitez    :  1955  MRN: [id number]  Restrictions/Precautions:    Medical/Treatment Diagnosis Information:  · Diagnosis: lumbar pain, lumbar HNP  · Treatment Diagnosis: low back pain M54.5, lumbar HNP P68.81  Insurance/Certification information:  PT Insurance Information: [employer]/[facility]  Physician Information:  Referring Practitioner: Dr Juan Antonio Laughlin of care signed (Y/N):     Date of Patient follow up with Physician:      Progress Report: [x]  Yes  []  No     Date Range for reporting period:  Beginnin2021  Ending: 3/17/2021    Progress report due (10 Rx/or 30 days whichever is less):      Recertification due (POC duration/ or 90 days whichever is less): 2021    Visit # Insurance Allowable Auth Needed   10 Medicare/[facility] (medicare guidelines) []Yes    [x]No     Pain level:  4/10   Functional Scale: HERVE 10% disability   Date Assessed: 3/17/2021    SUBJECTIVE:  Walking better overall,had a massage yesterday and thinks it was beneficial for sure. OBJECTIVE:    Observation:    Test measurements:      ROM   Comments   Lumbar Flex toes     Lumbar Ext fair        ROM LEFT RIGHT Comments   Lumbar Side Bend Feels pulling on L  Mid thigh bilaterally   Lumbar Rotation limited WNL L>R   Quadrant         Hip Flexion 108 105     Hip Abd         Hip ER 55 55     Hip IR 25 12     Hip Extension         Knee Ext         Knee Flex         Hamstring Flex Limited bilaterally- 60 Limited bilaterally- 50    Piriformis limited limited     Renny test                       Myotomes/Strength Normal Abnormal Comments   [x]? ALL NORMAL         Hip Abd     R 4/5, L 4-/5   Hip Ext     bilat 3+/5   Hip flexion (L1-L2 femoral) []?  []?  bilat 4/5   Knee extension (L2-L4 femoral) []?  []?      Knee flexion (S1 sciatic)         Dorsiflexion (L4-L5 deep peroneal) []?  []?      Great Toe Ext (L5 deep peroneal nerve) []?  []?      Ankle Eversion (S1-S2 super peroneal) []?  []?      Ankle PF(S1-S2 tibial) []?  []?      Multifidus []?  []?      Transverse Ab []?  []?          RESTRICTIONS/PRECAUTIONS: cervical fusion    Exercises/Interventions:     Therapeutic Ex (18814)   Min: 30 min sets/sec reps notes   glute max- prone 10 10    piriformis stretch bilat      SKTC stretch bilat      Side lying LB rot 10 10 bilat   LTR 2 10    Standing band pull down   Red, at forearm   Standing band lateral pull   Red, at forearm   1/2 kneeling down chop   Red band   Wall march  HEP    Standing hip abd  HEP    Standing hip ext  HEP    Seated march/kickout  20    Seated LS activation  20    Slide lunge       Ham string stretch  30 Seated with stool   Hip Flex stretch  30Glute Stretch      SLS Ball/wall glute      Manual Intervention (16590) Min: 21 min      DN      Prone PA 1 10    GISTM/STM  4    Lumbar Manip      SI Manip      Hip  Mobs/ROM 1 7 Prone and with belt- B hips   Hip LA distraction            NMR re-education (70100)   Min:      Mf Activation- re-ed      TrA Re-ed activation      Glute Max re-ed activation      Prone aruna Anderson            Therapeutic Activity (92038) Min:                                Access Code: [email or web address]   URL: [email or web address]   Date: 03/10/2021   Prepared by: Garret Robertsore     Exercises   Supine Lower Trunk Rotation - 10 reps - 2 sets - 1x daily - 7x weekly   Supine March - 10 reps - 2 sets - 1x daily - 7x weekly   Bent Knee Fallouts with Alternating Legs - 10 reps - 2 sets - 1x daily - 7x weekly   Standing March with Counter Support - 10 reps - 2 sets - 1x daily - 7x weekly   Standing Hip Abduction - 10 reps - 2 sets - 1x daily - 7x weekly       Therapeutic Exercise and NMR EXR  [x] (89335) Provided verbal/tactile cueing for activities related to strengthening, flexibility, endurance, ROM  for improvements in proximal hip and core control with self care, mobility, lifting and ambulation. [x] (45742) Provided verbal/tactile cueing for activities related to improving balance, coordination, kinesthetic sense, posture, motor skill, proprioception  to assist with core control in self care, mobility, lifting, and ambulation. Therapeutic Activities:    [] (54701 or 03029) Provided verbal/tactile cueing for activities related to improving balance, coordination, kinesthetic sense, posture, motor skill, proprioception and motor activation to allow for proper function  with self care and ADLs  [] (83843) Provided training and instruction to the patient for proper core and proximal hip recruitment and positioning with ambulation re-education     Home Exercise Program:    [x] (34954) Reviewed/Progressed HEP activities related to strengthening, flexibility, endurance, ROM of core, proximal hip and LE for functional self-care, mobility, lifting and ambulation   [] (09459) Reviewed/Progressed HEP activities related to improving balance, coordination, kinesthetic sense, posture, motor skill, proprioception of core, proximal hip and LE for self care, mobility, lifting, and ambulation      Manual Treatments:  PROM / STM / Oscillations-Mobs:  G-I, II, III, IV (PA's, Inf., Post.)  [x] (03056) Provided manual therapy to mobilize proximal hip and LS spine soft tissue/joints for the purpose of modulating pain, promoting relaxation,  increasing ROM, reducing/eliminating soft tissue swelling/inflammation/restriction, improving soft tissue extensibility and allowing for proper ROM for normal function with self care, mobility, lifting and ambulation.      Modalities:       Charges:  Timed Code Treatment Minutes: 51   Total Treatment Minutes: 51     [] EVAL (LOW) 96237 (typically 20 minutes face-to-face)  [] EVAL (MOD) 45787 (typically 30 minutes face-to-face) [] EVAL (HIGH) 79579 (typically 45 minutes face-to-face)  [] RE-EVAL     [x] OU(99315) x  2   [] DRY NEEDLE 1 OR 2 MUSCLES  [] NMR (97738) x     [] DRY NEEDLE 3+ MUSCLES  [x] Manual (43661) x  1     [] TA (30077) x     [] Mech Traction (72637)  [] ES(attended) (99605)     [] ES (un) (44213):   [] VASO (41872)  [] Other:    If BWC Please Indicate Time In/Out  CPT Code Time in Time out                                     GOALS:  Patient stated goal: decrease pain  [x] Progressing: [] Met: [] Not Met: [] Adjusted  Therapist goals for Patient:   Short Term Goals: To be achieved in: 2 weeks  1. Independent in HEP and progression per patient tolerance, in order to prevent re-injury. [] Progressing: [x] Met: [] Not Met: [] Adjusted  2. Patient will have a decrease in pain to facilitate improvement in movement, function, and ADLs as indicated by Functional Deficits. [x] Progressing: [] Met: [] Not Met: [] Adjusted    Long Term Goals: To be achieved in: 4 weeks  1. Disability index score of 10% or less for the HERVE to assist with reaching prior level of function. [] Progressing: [x] Met: [] Not Met: [] Adjusted  2. Patient will demonstrate increased AROM to WNL, good LS mobility, good hip ROM to allow for proper joint functioning as indicated by patients Functional Deficits. [x] Progressing: [] Met: [] Not Met: [] Adjusted  3. Patient will demonstrate an increase in Strength to good proximal hip and core activation to allow for proper functional mobility as indicated by patients Functional Deficits. [x] Progressing: [] Met: [] Not Met: [] Adjusted  4. Patient will return to walking, standing, sitting functional activities without increased symptoms or restriction. [x] Progressing: [] Met: [] Not Met: [] Adjusted  5.  Patient will report being able to walk dog without increased symptoms or restriction. (patient specific functional goal)    [x] Progressing: [] Met: [] Not Met: [] Adjusted     ASSESSMENT:  Did well with manual and activation exercises. Treatment/Activity Tolerance:   [x] Patient tolerated treatment well [] Patient limited by fatique  [] Patient limited by pain  [] Patient limited by other medical complications  [] Other:     Overall Progression Towards Functional goals/ Treatment Progress Update:  [x] Patient is progressing as expected towards functional goals listed. [] Progression is slowed due to complexities/Impairments listed. [] Progression has been slowed due to co-morbidities. [] Plan just implemented, too soon to assess goals progression <30days   [] Goals require adjustment due to lack of progress  [] Patient is not progressing as expected and requires additional follow up with physician  [] Other:    Prognosis for POC: [x] Good [] Fair  [] Poor    Patient requires continued skilled intervention: [x] Yes  [] No        PLAN: Continue mobility, endurance of proximal hip for walking. [x] Continue per plan of care [] Alter current plan (see comments)  [] Plan of care initiated [] Hold pending MD visit [] Discharge    Electronically signed by: Preston Khoury PT    Note: If patient does not return for scheduled/recommended follow up visits, this note will serve as a discharge from care along with the most recent update on progress.

## 2021-03-19 NOTE — PLAN OF CARE
[address] [place] [address] Elis Morin  Phone: [phone]   Fax:     [phone]          Physical Therapy Treatment Note/ Progress Report:     Date:  3/19/2021    Patient Name:  Dai Zuleta    :  1955  MRN: [id number]  Restrictions/Precautions:    Medical/Treatment Diagnosis Information:  · Diagnosis: lumbar pain, lumbar HNP  · Treatment Diagnosis: low back pain M54.5, lumbar HNP D80.46  Insurance/Certification information:  PT Insurance Information: [employer]/[facility]  Physician Information:  Referring Practitioner: Dr Candy Roberto of care signed (Y/N):     Date of Patient follow up with Physician:      Progress Report: [x]  Yes  []  No     Date Range for reporting period:  Beginnin2021  Ending: 3/17/2021    Progress report due (10 Rx/or 30 days whichever is less):      Recertification due (POC duration/ or 90 days whichever is less): 2021    Visit # Insurance Allowable Auth Needed   10 Medicare/[facility] (medicare guidelines) []Yes    [x]No     Pain level:  4/10   Functional Scale: HERVE 10% disability   Date Assessed: 3/17/2021    SUBJECTIVE:  Walking better overall,had a massage yesterday and thinks it was beneficial for sure. OBJECTIVE:    Observation:    Test measurements:      ROM   Comments   Lumbar Flex toes     Lumbar Ext fair        ROM LEFT RIGHT Comments   Lumbar Side Bend Feels pulling on L  Mid thigh bilaterally   Lumbar Rotation limited WNL L>R   Quadrant         Hip Flexion 108 105     Hip Abd         Hip ER 55 55     Hip IR 25 12     Hip Extension         Knee Ext         Knee Flex         Hamstring Flex Limited bilaterally- 60 Limited bilaterally- 50    Piriformis limited limited     Renny test                       Myotomes/Strength Normal Abnormal Comments   [x]? ALL NORMAL         Hip Abd     R 4/5, L 4-/5   Hip Ext     bilat 3+/5   Hip flexion (L1-L2 femoral) []?  []?  bilat 4/5   Knee extension (L2-L4 femoral) []?  []?      Knee flexion (S1 sciatic)         Dorsiflexion (L4-L5 deep peroneal) []?  []?      Great Toe Ext (L5 deep peroneal nerve) []?  []?      Ankle Eversion (S1-S2 super peroneal) []?  []?      Ankle PF(S1-S2 tibial) []?  []?      Multifidus []?  []?      Transverse Ab []?  []?          RESTRICTIONS/PRECAUTIONS: cervical fusion    Exercises/Interventions:     Therapeutic Ex (11184)   Min: 30 min sets/sec reps notes   glute max- prone 10 10    piriformis stretch bilat      SKTC stretch bilat      Side lying LB rot 10 10 bilat   LTR 2 10    Standing band pull down   Red, at forearm   Standing band lateral pull   Red, at forearm   1/2 kneeling down chop   Red band   Wall march  HEP    Standing hip abd  HEP    Standing hip ext  HEP    Seated march/kickout  20    Seated LS activation  20    Slide lunge       Ham string stretch  30 Seated with stool   Hip Flex stretch  30Glute Stretch      SLS Ball/wall glute      Manual Intervention (68841) Min: 21 min      DN      Prone PA 1 10    GISTM/STM  4    Lumbar Manip      SI Manip      Hip  Mobs/ROM 1 7 Prone and with belt- B hips   Hip LA distraction            NMR re-education (97181)   Min:      Mf Activation- re-ed      TrA Re-ed activation      Glute Max re-ed activation      Prone aruna Anderson            Therapeutic Activity (40748) Min:                                Access Code: [email or web address]   URL: [email or web address]   Date: 03/10/2021   Prepared by: Jeet Bread     Exercises   Supine Lower Trunk Rotation - 10 reps - 2 sets - 1x daily - 7x weekly   Supine March - 10 reps - 2 sets - 1x daily - 7x weekly   Bent Knee Fallouts with Alternating Legs - 10 reps - 2 sets - 1x daily - 7x weekly   Standing March with Counter Support - 10 reps - 2 sets - 1x daily - 7x weekly   Standing Hip Abduction - 10 reps - 2 sets - 1x daily - 7x weekly       Therapeutic Exercise and NMR EXR  [x] (07646) Provided verbal/tactile cueing for [] EVAL (HIGH) 42456 (typically 45 minutes face-to-face)  [] RE-EVAL     [x] RG(26570) x  2   [] DRY NEEDLE 1 OR 2 MUSCLES  [] NMR (53584) x     [] DRY NEEDLE 3+ MUSCLES  [x] Manual (97260) x  1     [] TA (39465) x     [] Mech Traction (33139)  [] ES(attended) (87814)     [] ES (un) (14084):   [] VASO (38409)  [] Other:    If BWC Please Indicate Time In/Out  CPT Code Time in Time out                                     GOALS:  Patient stated goal: decrease pain  [x] Progressing: [] Met: [] Not Met: [] Adjusted  Therapist goals for Patient:   Short Term Goals: To be achieved in: 2 weeks  1. Independent in HEP and progression per patient tolerance, in order to prevent re-injury. [] Progressing: [x] Met: [] Not Met: [] Adjusted  2. Patient will have a decrease in pain to facilitate improvement in movement, function, and ADLs as indicated by Functional Deficits. [x] Progressing: [] Met: [] Not Met: [] Adjusted    Long Term Goals: To be achieved in: 4 weeks  1. Disability index score of 10% or less for the HERVE to assist with reaching prior level of function. [] Progressing: [x] Met: [] Not Met: [] Adjusted  2. Patient will demonstrate increased AROM to WNL, good LS mobility, good hip ROM to allow for proper joint functioning as indicated by patients Functional Deficits. [x] Progressing: [] Met: [] Not Met: [] Adjusted  3. Patient will demonstrate an increase in Strength to good proximal hip and core activation to allow for proper functional mobility as indicated by patients Functional Deficits. [x] Progressing: [] Met: [] Not Met: [] Adjusted  4. Patient will return to walking, standing, sitting functional activities without increased symptoms or restriction. [x] Progressing: [] Met: [] Not Met: [] Adjusted  5.  Patient will report being able to walk dog without increased symptoms or restriction. (patient specific functional goal)    [x] Progressing: [] Met: [] Not Met: [] Adjusted     ASSESSMENT:  Did well with manual and activation exercises. Treatment/Activity Tolerance:   [x] Patient tolerated treatment well [] Patient limited by fatique  [] Patient limited by pain  [] Patient limited by other medical complications  [] Other:     Overall Progression Towards Functional goals/ Treatment Progress Update:  [x] Patient is progressing as expected towards functional goals listed. [] Progression is slowed due to complexities/Impairments listed. [] Progression has been slowed due to co-morbidities. [] Plan just implemented, too soon to assess goals progression <30days   [] Goals require adjustment due to lack of progress  [] Patient is not progressing as expected and requires additional follow up with physician  [] Other:    Prognosis for POC: [x] Good [] Fair  [] Poor    Patient requires continued skilled intervention: [x] Yes  [] No        PLAN: Continue mobility, endurance of proximal hip for walking. [x] Continue per plan of care [] Alter current plan (see comments)  [] Plan of care initiated [] Hold pending MD visit [] Discharge    Electronically signed by: Treasure Buerger, PT    Note: If patient does not return for scheduled/recommended follow up visits, this note will serve as a discharge from care along with the most recent update on progress.

## 2021-03-23 ENCOUNTER — HOSPITAL ENCOUNTER (OUTPATIENT)
Dept: PHYSICAL THERAPY | Age: 66
Setting detail: THERAPIES SERIES
Discharge: HOME | End: 2021-03-23
Payer: MEDICARE

## 2021-03-23 PROCEDURE — 97110 THERAPEUTIC EXERCISES: CPT

## 2021-03-23 PROCEDURE — 97140 MANUAL THERAPY 1/> REGIONS: CPT

## 2021-03-23 NOTE — FLOWSHEET NOTE
Prasad Morin [address]  Phone: [phone]   Fax:     [phone]          Physical Therapy Treatment Note/ Progress Report:     Date:  3/23/2021    Patient Name:  Bonnie Tam    :  1955  MRN: [id number]  Restrictions/Precautions:    Medical/Treatment Diagnosis Information:  · Diagnosis: lumbar pain, lumbar HNP  · Treatment Diagnosis: low back pain M54.5, lumbar HNP R96.43  Insurance/Certification information:  PT Insurance Information: [facility]  Physician Information:  Referring Practitioner: Dr Minh Carlson of care signed (Y/N):     Date of Patient follow up with Physician:      Progress Report: [x]  Yes  []  No     Date Range for reporting period:  Beginnin2021  Ending: 3/17/2021    Progress report due (10 Rx/or 30 days whichever is less):      Recertification due (POC duration/ or 90 days whichever is less): 2021    Visit # Insurance Allowable Auth Needed   11 Medicare/[facility] (medicare guidelines) []Yes    [x]No     Pain level:  4/10   Functional Scale: HERVE 10% disability   Date Assessed: 3/17/2021    SUBJECTIVE:  Walking better overall. States that he had a painfree day on . A little sore and achy today- had a stressful morning and car ride over the therapy. OBJECTIVE:    Observation:    Test measurements:      ROM   Comments   Lumbar Flex toes     Lumbar Ext fair        ROM LEFT RIGHT Comments   Lumbar Side Bend Feels pulling on L  Mid thigh bilaterally   Lumbar Rotation limited WNL L>R   Quadrant         Hip Flexion 108 105     Hip Abd         Hip ER 55 55     Hip IR 25 12     Hip Extension         Knee Ext         Knee Flex         Hamstring Flex Limited bilaterally- 60 Limited bilaterally- 50    Piriformis limited limited     Renny test                       Myotomes/Strength Normal Abnormal Comments   [x]? ALL NORMAL         Hip Abd     R 4/5, L 4-/5   Hip Ext     bilat 3+/5   Hip flexion (L1-L2 femoral) []?  []?  bilat 4/5   Knee extension (L2-L4 femoral) []?  []?      Knee flexion (S1 sciatic)         Dorsiflexion (L4-L5 deep peroneal) []?  []?      Great Toe Ext (L5 deep peroneal nerve) []?  []?      Ankle Eversion (S1-S2 super peroneal) []?  []?      Ankle PF(S1-S2 tibial) []?  []?      Multifidus []?  []?      Transverse Ab []?  []?          RESTRICTIONS/PRECAUTIONS: cervical fusion    Exercises/Interventions:     Therapeutic Ex (22315)   Min: 25 min sets/sec reps notes      piriformis stretch bilat 30 4    SKTC stretch bilat 30 4    bilat      Standing band pull down  20 Red, at forearm   Standing band lateral pull  20 Red, at forearm   1/2 kneeling down chop   Red band   Wall march  20    Standing hip abd  20    Standing hip ext  20    Seated march/kickout  20    Seated LS activation  20    Slide lunge       Glute Stretch      SLS Ball/wall glute      Manual Intervention (58014) Min: 21 min      DN      Prone PA 1 10    GISTM/STM  4    Lumbar Manip      SI Manip      Hip  Mobs/ROM 1 7 Prone and with belt- B hips   Hip LA distraction            NMR re-education (89119)   Min:      Mf Activation- re-ed      TrA Re-ed activation      Glute Max re-ed activation      Prone aruna Anderson            Therapeutic Activity (37747) Min:                                Access Code: [email or web address]   URL: [email or web address]   Date: 03/10/2021   Prepared by: Hans Damon     Exercises   Supine Lower Trunk Rotation - 10 reps - 2 sets - 1x daily - 7x weekly   Supine March - 10 reps - 2 sets - 1x daily - 7x weekly   Bent Knee Fallouts with Alternating Legs - 10 reps - 2 sets - 1x daily - 7x weekly   Standing March with Counter Support - 10 reps - 2 sets - 1x daily - 7x weekly   Standing Hip Abduction - 10 reps - 2 sets - 1x daily - 7x weekly       Therapeutic Exercise and NMR EXR  [x] (76232) Provided verbal/tactile cueing for activities related to strengthening, flexibility, endurance, ROM  for improvements in proximal hip and core control with self care, mobility, lifting and ambulation. [x] (88008) Provided verbal/tactile cueing for activities related to improving balance, coordination, kinesthetic sense, posture, motor skill, proprioception  to assist with core control in self care, mobility, lifting, and ambulation. Therapeutic Activities:    [] (68062 or 83501) Provided verbal/tactile cueing for activities related to improving balance, coordination, kinesthetic sense, posture, motor skill, proprioception and motor activation to allow for proper function  with self care and ADLs  [] (40793) Provided training and instruction to the patient for proper core and proximal hip recruitment and positioning with ambulation re-education     Home Exercise Program:    [x] (29336) Reviewed/Progressed HEP activities related to strengthening, flexibility, endurance, ROM of core, proximal hip and LE for functional self-care, mobility, lifting and ambulation   [] (51555) Reviewed/Progressed HEP activities related to improving balance, coordination, kinesthetic sense, posture, motor skill, proprioception of core, proximal hip and LE for self care, mobility, lifting, and ambulation      Manual Treatments:  PROM / STM / Oscillations-Mobs:  G-I, II, III, IV (PA's, Inf., Post.)  [x] (02922) Provided manual therapy to mobilize proximal hip and LS spine soft tissue/joints for the purpose of modulating pain, promoting relaxation,  increasing ROM, reducing/eliminating soft tissue swelling/inflammation/restriction, improving soft tissue extensibility and allowing for proper ROM for normal function with self care, mobility, lifting and ambulation.      Modalities:       Charges:  Timed Code Treatment Minutes: 46   Total Treatment Minutes: 46     [] EVAL (LOW) 13893 (typically 20 minutes face-to-face)  [] EVAL (MOD) 70268 (typically 30 minutes face-to-face)  [] EVAL (HIGH) 66477 (typically 45 minutes improved at conclusion of session. Treatment/Activity Tolerance:   [x] Patient tolerated treatment well [] Patient limited by fatique  [] Patient limited by pain  [] Patient limited by other medical complications  [] Other:     Overall Progression Towards Functional goals/ Treatment Progress Update:  [x] Patient is progressing as expected towards functional goals listed. [] Progression is slowed due to complexities/Impairments listed. [] Progression has been slowed due to co-morbidities. [] Plan just implemented, too soon to assess goals progression <30days   [] Goals require adjustment due to lack of progress  [] Patient is not progressing as expected and requires additional follow up with physician  [] Other:    Prognosis for POC: [x] Good [] Fair  [] Poor    Patient requires continued skilled intervention: [x] Yes  [] No        PLAN: Continue mobility, endurance of proximal hip for walking. [x] Continue per plan of care [] Alter current plan (see comments)  [] Plan of care initiated [] Hold pending MD visit [] Discharge    Electronically signed by: Gamal Das PT    Note: If patient does not return for scheduled/recommended follow up visits, this note will serve as a discharge from care along with the most recent update on progress.

## 2021-03-26 ENCOUNTER — HOSPITAL ENCOUNTER (OUTPATIENT)
Dept: PHYSICAL THERAPY | Age: 66
Setting detail: THERAPIES SERIES
Discharge: HOME | End: 2021-03-26
Payer: MEDICARE

## 2021-03-26 PROCEDURE — 97140 MANUAL THERAPY 1/> REGIONS: CPT

## 2021-03-26 PROCEDURE — 97110 THERAPEUTIC EXERCISES: CPT

## 2021-03-26 NOTE — FLOWSHEET NOTE
[address] [place] [address] Elis Morin  Phone: [phone]   Fax:     [phone]          Physical Therapy Treatment Note/ Progress Report:     Date:  3/26/2021    Patient Name:  Dai Zuleta    :  1955  MRN: [id number]  Restrictions/Precautions:    Medical/Treatment Diagnosis Information:  · Diagnosis: lumbar pain, lumbar HNP  · Treatment Diagnosis: low back pain M54.5, lumbar HNP P14.76  Insurance/Certification information:  PT Insurance Information: [employer]/[facility]  Physician Information:  Referring Practitioner: Dr Candy Roberto of care signed (Y/N):     Date of Patient follow up with Physician:      Progress Report: [x]  Yes  []  No     Date Range for reporting period:  Beginnin2021  Ending: 3/17/2021    Progress report due (10 Rx/or 30 days whichever is less): 9698     Recertification due (POC duration/ or 90 days whichever is less): 2021    Visit # Insurance Allowable Auth Needed   12 Medicare/[facility] (medicare guidelines) []Yes    [x]No     Pain level:  4/10   Functional Scale: HERVE 10% disability   Date Assessed: 3/17/2021    SUBJECTIVE:  Walking better overall. States that he had a painfree day on . Just a little sore overall today. OBJECTIVE:    Observation:    Test measurements:      ROM   Comments   Lumbar Flex toes     Lumbar Ext fair        ROM LEFT RIGHT Comments   Lumbar Side Bend Feels pulling on L  Mid thigh bilaterally   Lumbar Rotation limited WNL L>R   Quadrant         Hip Flexion 108 105     Hip Abd         Hip ER 55 55     Hip IR 25 12     Hip Extension         Knee Ext         Knee Flex         Hamstring Flex Limited bilaterally- 60 Limited bilaterally- 50    Piriformis limited limited     Renny test                       Myotomes/Strength Normal Abnormal Comments   [x]? ALL NORMAL         Hip Abd     R 4/5, L 4-/5   Hip Ext     bilat 3+/5   Hip flexion (L1-L2 femoral) []?  []?  bilat 4/5   Knee extension (L2-L4 femoral) []?  []?      Knee flexion (S1 sciatic)         Dorsiflexion (L4-L5 deep peroneal) []?  []?      Great Toe Ext (L5 deep peroneal nerve) []?  []?      Ankle Eversion (S1-S2 super peroneal) []?  []?      Ankle PF(S1-S2 tibial) []?  []?      Multifidus []?  []?      Transverse Ab []?  []?          RESTRICTIONS/PRECAUTIONS: cervical fusion    Exercises/Interventions:     Therapeutic Ex (41987)   Min: 20 min sets/sec reps notes      piriformis stretch bilat 30 4    SKTC stretch bilat 30 4    bilat      Standing band pull down  20 Red, at forearm   Standing band lateral pull  20 Red, at forearm   1/2 kneeling down chop   Red band   Wall march  20    Standing hip abd  20    Standing hip ext  20    Seated march/kickout  20    Seated LS activation  20    Slide lunge       Glute Stretch      SLS Ball/wall glute      Manual Intervention (47702) Min: 25 min      DN      Prone PA 1 10    GISTM/STM  5    Lumbar Manip      SI Manip      Hip  Mobs/ROM 1 10 Prone and with belt- B hips   Hip LA distraction            NMR re-education (53823)   Min:      Mf Activation- re-ed      TrA Re-ed activation      Glute Max re-ed activation      Prone aruna Anderson            Therapeutic Activity (42735) Min:                                Access Code: [email or web address]   URL: [email or web address]   Date: 03/10/2021   Prepared by: Kennedy Holstein     Exercises   Supine Lower Trunk Rotation - 10 reps - 2 sets - 1x daily - 7x weekly   Supine March - 10 reps - 2 sets - 1x daily - 7x weekly   Bent Knee Fallouts with Alternating Legs - 10 reps - 2 sets - 1x daily - 7x weekly   Standing March with Counter Support - 10 reps - 2 sets - 1x daily - 7x weekly   Standing Hip Abduction - 10 reps - 2 sets - 1x daily - 7x weekly       Therapeutic Exercise and NMR EXR  [x] (07620) Provided verbal/tactile cueing for activities related to strengthening, flexibility, endurance, ROM  for improvements in proximal hip and core control with self care, mobility, lifting and ambulation. [x] (80200) Provided verbal/tactile cueing for activities related to improving balance, coordination, kinesthetic sense, posture, motor skill, proprioception  to assist with core control in self care, mobility, lifting, and ambulation. Therapeutic Activities:    [] (78854 or 21139) Provided verbal/tactile cueing for activities related to improving balance, coordination, kinesthetic sense, posture, motor skill, proprioception and motor activation to allow for proper function  with self care and ADLs  [] (75362) Provided training and instruction to the patient for proper core and proximal hip recruitment and positioning with ambulation re-education     Home Exercise Program:    [x] (71345) Reviewed/Progressed HEP activities related to strengthening, flexibility, endurance, ROM of core, proximal hip and LE for functional self-care, mobility, lifting and ambulation   [] (00384) Reviewed/Progressed HEP activities related to improving balance, coordination, kinesthetic sense, posture, motor skill, proprioception of core, proximal hip and LE for self care, mobility, lifting, and ambulation      Manual Treatments:  PROM / STM / Oscillations-Mobs:  G-I, II, III, IV (PA's, Inf., Post.)  [x] (31454) Provided manual therapy to mobilize proximal hip and LS spine soft tissue/joints for the purpose of modulating pain, promoting relaxation,  increasing ROM, reducing/eliminating soft tissue swelling/inflammation/restriction, improving soft tissue extensibility and allowing for proper ROM for normal function with self care, mobility, lifting and ambulation.      Modalities:       Charges:  Timed Code Treatment Minutes: 46   Total Treatment Minutes: 46     [] EVAL (LOW) 98787 (typically 20 minutes face-to-face)  [] EVAL (MOD) 94108 (typically 30 minutes face-to-face)  [] EVAL (HIGH) 71846 (typically 45 minutes face-to-face)  [] RE-EVAL     [x] FN(78846) x  1   [] DRY NEEDLE 1 OR 2 MUSCLES  [] NMR (44577) x     [] DRY NEEDLE 3+ MUSCLES  [x] Manual (53742) x  2     [] TA (21344) x     [] Mech Traction (67306)  [] ES(attended) (95340)     [] ES (un) (21600):   [] VASO (08127)  [] Other:    If BWC Please Indicate Time In/Out  CPT Code Time in Time out                                     GOALS:  Patient stated goal: decrease pain  [x] Progressing: [] Met: [] Not Met: [] Adjusted  Therapist goals for Patient:   Short Term Goals: To be achieved in: 2 weeks  1. Independent in HEP and progression per patient tolerance, in order to prevent re-injury. [] Progressing: [x] Met: [] Not Met: [] Adjusted  2. Patient will have a decrease in pain to facilitate improvement in movement, function, and ADLs as indicated by Functional Deficits. [x] Progressing: [] Met: [] Not Met: [] Adjusted    Long Term Goals: To be achieved in: 4 weeks  1. Disability index score of 10% or less for the HERVE to assist with reaching prior level of function. [] Progressing: [x] Met: [] Not Met: [] Adjusted  2. Patient will demonstrate increased AROM to WNL, good LS mobility, good hip ROM to allow for proper joint functioning as indicated by patients Functional Deficits. [x] Progressing: [] Met: [] Not Met: [] Adjusted  3. Patient will demonstrate an increase in Strength to good proximal hip and core activation to allow for proper functional mobility as indicated by patients Functional Deficits. [x] Progressing: [] Met: [] Not Met: [] Adjusted  4. Patient will return to walking, standing, sitting functional activities without increased symptoms or restriction. [x] Progressing: [] Met: [] Not Met: [] Adjusted  5. Patient will report being able to walk dog without increased symptoms or restriction. (patient specific functional goal)    [x] Progressing: [] Met: [] Not Met: [] Adjusted     ASSESSMENT:  Did well with manual and activation exercises. Feeling improved at conclusion of session. Treatment/Activity Tolerance:   [x] Patient tolerated treatment well [] Patient limited by fatique  [] Patient limited by pain  [] Patient limited by other medical complications  [] Other:     Overall Progression Towards Functional goals/ Treatment Progress Update:  [x] Patient is progressing as expected towards functional goals listed. [] Progression is slowed due to complexities/Impairments listed. [] Progression has been slowed due to co-morbidities. [] Plan just implemented, too soon to assess goals progression <30days   [] Goals require adjustment due to lack of progress  [] Patient is not progressing as expected and requires additional follow up with physician  [] Other:    Prognosis for POC: [x] Good [] Fair  [] Poor    Patient requires continued skilled intervention: [x] Yes  [] No        PLAN: Continue mobility, endurance of proximal hip for walking. [x] Continue per plan of care [] Alter current plan (see comments)  [] Plan of care initiated [] Hold pending MD visit [] Discharge    Electronically signed by: Kassie Sebastian PT    Note: If patient does not return for scheduled/recommended follow up visits, this note will serve as a discharge from care along with the most recent update on progress.

## 2021-03-30 ENCOUNTER — HOSPITAL ENCOUNTER (OUTPATIENT)
Dept: PHYSICAL THERAPY | Age: 66
Setting detail: THERAPIES SERIES
Discharge: HOME | End: 2021-03-30
Payer: MEDICARE

## 2021-03-30 PROCEDURE — 97140 MANUAL THERAPY 1/> REGIONS: CPT

## 2021-03-30 PROCEDURE — 97110 THERAPEUTIC EXERCISES: CPT

## 2021-03-30 NOTE — FLOWSHEET NOTE
[address] [place] [address]  Phone: [phone]   Fax:     [phone]          Physical Therapy Treatment Note/ Progress Report:     Date:  3/30/2021    Patient Name:  Michael Schuler    :  1955  MRN: [id number]  Restrictions/Precautions:    Medical/Treatment Diagnosis Information:  · Diagnosis: lumbar pain, lumbar HNP  · Treatment Diagnosis: low back pain M54.5, lumbar HNP N72.44  Insurance/Certification information:  PT Insurance Information: [employer]  Physician Information:  Referring Practitioner: Dr Yen Jacinto of care signed (Y/N):     Date of Patient follow up with Physician:      Progress Report: [x]  Yes  []  No     Date Range for reporting period:  Beginnin2021  Ending: 3/17/2021    Progress report due (10 Rx/or 30 days whichever is less): 3016     Recertification due (POC duration/ or 90 days whichever is less): 2021    Visit # Insurance Allowable Auth Needed   13 Medicare/[facility] (medicare guidelines) []Yes    [x]No     Pain level:  4/10   Functional Scale: HERVE 10% disability   Date Assessed: 3/17/2021    SUBJECTIVE:  Walking better overall. Reports that he walked thru [employer] after last session and felt pretty good with this. Is quite stiff today as he had to drive to take dog to get put down, then had to drive to crematory, and then here- so has spent quite a bit of time in car. Having more better days. L knee is a bit bothersome and having some sharp pain intermittently- is going to be going to MD to have looked at further- has a known meniscus tear.       OBJECTIVE:    Observation:    Test measurements:      ROM   Comments   Lumbar Flex toes     Lumbar Ext fair        ROM LEFT RIGHT Comments   Lumbar Side Bend Feels pulling on L  Mid thigh bilaterally   Lumbar Rotation limited WNL L>R   Quadrant         Hip Flexion 108 105     Hip Abd         Hip ER 55 55     Hip IR 25 12     Hip Extension         Knee Ext         Knee Flex         Hamstring Flex Limited bilaterally- 60 Limited bilaterally- 50    Piriformis limited limited     Renny test                       Myotomes/Strength Normal Abnormal Comments   [x]? ALL NORMAL         Hip Abd     R 4/5, L 4-/5   Hip Ext     bilat 3+/5   Hip flexion (L1-L2 femoral) []?  []?  bilat 4/5   Knee extension (L2-L4 femoral) []?  []?      Knee flexion (S1 sciatic)         Dorsiflexion (L4-L5 deep peroneal) []?  []?      Great Toe Ext (L5 deep peroneal nerve) []?  []?      Ankle Eversion (S1-S2 super peroneal) []?  []?      Ankle PF(S1-S2 tibial) []?  []?      Multifidus []?  []?      Transverse Ab []?  []?          RESTRICTIONS/PRECAUTIONS: cervical fusion    Exercises/Interventions:     Therapeutic Ex (60412)   Min: 25 min sets/sec reps notes      piriformis stretch bilat 30 4    SKTC stretch bilat 30 4    bilat   LTR 2 10    Standing band pull down  20 green, at forearm   Standing band lateral pull  20 Red, at forearm   1/2 kneeling down chop   Red band   Wall march  20    Standing hip abd  20    Standing hip ext  20    Seated march/kickout  20    Seated LS activation  20    BOSU lunge + slosh tube resting on forearms  10/ea    Glute Stretch      SLS Ball/wall glute      Manual Intervention (51638) Min: 20 min      DN      Prone PA 1 10    GISTM/STM  0    Lumbar Manip      SI Manip      Hip  Mobs/ROM 1 10 Prone and with belt- B hips   Hip LA distraction            NMR re-education (00213)   Min:      Mf Activation- re-ed      TrA Re-ed activation      Glute Max re-ed activation      Prone aruna      [place]            Therapeutic Activity (42070) Min:                                Access Code: [email or web address]   URL: [email or web address]   Date: 03/10/2021   Prepared by: Isidore Breath     Exercises   Supine Lower Trunk Rotation - 10 reps - 2 sets - 1x daily - 7x weekly   Supine March - 10 reps - 2 sets - 1x daily - 7x weekly   Bent Knee Fallouts with proper ROM for normal function with self care, mobility, lifting and ambulation. Modalities:       Charges:  Timed Code Treatment Minutes: 45   Total Treatment Minutes: 45     [] EVAL (LOW) 17155 (typically 20 minutes face-to-face)  [] EVAL (MOD) 02172 (typically 30 minutes face-to-face)  [] EVAL (HIGH) 16780 (typically 45 minutes face-to-face)  [] RE-EVAL     [x] GM(98669) x  1   [] DRY NEEDLE 1 OR 2 MUSCLES  [] NMR (38003) x     [] DRY NEEDLE 3+ MUSCLES  [x] Manual (81733) x  2     [] TA (68859) x     [] Mech Traction (07586)  [] ES(attended) (54234)     [] ES (un) (92836):   [] VASO (52336)  [] Other:    If BWC Please Indicate Time In/Out  CPT Code Time in Time out                                     GOALS:  Patient stated goal: decrease pain  [x] Progressing: [] Met: [] Not Met: [] Adjusted  Therapist goals for Patient:   Short Term Goals: To be achieved in: 2 weeks  1. Independent in HEP and progression per patient tolerance, in order to prevent re-injury. [] Progressing: [x] Met: [] Not Met: [] Adjusted  2. Patient will have a decrease in pain to facilitate improvement in movement, function, and ADLs as indicated by Functional Deficits. [x] Progressing: [] Met: [] Not Met: [] Adjusted    Long Term Goals: To be achieved in: 4 weeks  1. Disability index score of 10% or less for the HERVE to assist with reaching prior level of function. [] Progressing: [x] Met: [] Not Met: [] Adjusted  2. Patient will demonstrate increased AROM to WNL, good LS mobility, good hip ROM to allow for proper joint functioning as indicated by patients Functional Deficits. [x] Progressing: [] Met: [] Not Met: [] Adjusted  3. Patient will demonstrate an increase in Strength to good proximal hip and core activation to allow for proper functional mobility as indicated by patients Functional Deficits. [x] Progressing: [] Met: [] Not Met: [] Adjusted  4.  Patient will return to walking, standing, sitting functional activities without increased symptoms or restriction. [x] Progressing: [] Met: [] Not Met: [] Adjusted  5. Patient will report being able to walk dog without increased symptoms or restriction. (patient specific functional goal)    [x] Progressing: [] Met: [] Not Met: [] Adjusted     ASSESSMENT:  Did well with manual and activation exercises. Improving hip mobility and with quicker return to normal range when stiff. Improving core and hip activation. Feeling improved at conclusion of session. Treatment/Activity Tolerance:   [x] Patient tolerated treatment well [] Patient limited by fatique  [] Patient limited by pain  [] Patient limited by other medical complications  [] Other:     Overall Progression Towards Functional goals/ Treatment Progress Update:  [x] Patient is progressing as expected towards functional goals listed. [] Progression is slowed due to complexities/Impairments listed. [] Progression has been slowed due to co-morbidities. [] Plan just implemented, too soon to assess goals progression <30days   [] Goals require adjustment due to lack of progress  [] Patient is not progressing as expected and requires additional follow up with physician  [] Other:    Prognosis for POC: [x] Good [] Fair  [] Poor    Patient requires continued skilled intervention: [x] Yes  [] No        PLAN: Continue mobility, endurance of proximal hip for walking. [x] Continue per plan of care [] Alter current plan (see comments)  [] Plan of care initiated [] Hold pending MD visit [] Discharge    Electronically signed by: Marlene Astudillo PT    Note: If patient does not return for scheduled/recommended follow up visits, this note will serve as a discharge from care along with the most recent update on progress.

## 2021-04-02 ENCOUNTER — HOSPITAL ENCOUNTER (OUTPATIENT)
Dept: PHYSICAL THERAPY | Age: 66
Setting detail: THERAPIES SERIES
Discharge: HOME | End: 2021-04-02
Payer: MEDICARE

## 2021-04-02 PROCEDURE — 97140 MANUAL THERAPY 1/> REGIONS: CPT

## 2021-04-02 PROCEDURE — 97110 THERAPEUTIC EXERCISES: CPT

## 2021-04-02 NOTE — FLOWSHEET NOTE
[address]  Phone: [phone]   Fax:     [phone]          Physical Therapy Treatment Note/ Progress Report:     Date:  2021    Patient Name:  Shantell Lomas    :  1955  MRN: [id number]  Restrictions/Precautions:    Medical/Treatment Diagnosis Information:  · Diagnosis: lumbar pain, lumbar HNP  · Treatment Diagnosis: low back pain M54.5, lumbar HNP Y47.05  Insurance/Certification information:  PT Insurance Information: [facility]  Physician Information:  Referring Practitioner: Dr Oleg Huynh of care signed (Y/N):     Date of Patient follow up with Physician:      Progress Report: [x]  Yes  []  No     Date Range for reporting period:  Beginnin2021  Ending: 3/17/2021    Progress report due (10 Rx/or 30 days whichever is less):      Recertification due (POC duration/ or 90 days whichever is less): 2021    Visit # Insurance Allowable Auth Needed   14 Medicare/[facility] (medicare guidelines) []Yes    [x]No     Pain level:  0/10   Functional Scale: HERVE 10% disability   Date Assessed: 3/17/2021    SUBJECTIVE:  Patient reports that he no longer is having any pain in back - just feeling more soreness than anything. States that he notes that he has been feeling stronger and doing better with getting up from chair. Walking better overall. Knee is hurting less as well. Was able to take his big dog on a mile walk this morning.      OBJECTIVE:    Observation:    Test measurements:      ROM   Comments   Lumbar Flex toes     Lumbar Ext fair        ROM LEFT RIGHT Comments   Lumbar Side Bend Feels pulling on L  Mid thigh bilaterally   Lumbar Rotation limited WNL L>R   Quadrant         Hip Flexion 108 105     Hip Abd         Hip ER 55 55     Hip IR 25 12     Hip Extension         Knee Ext         Knee Flex         Hamstring Flex Limited bilaterally- 60 Limited bilaterally- 50    Piriformis limited limited     Renny test                       Myotomes/Strength Normal Abnormal Comments   [x]? ALL NORMAL         Hip Abd     R 4/5, L 4-/5   Hip Ext     bilat 3+/5   Hip flexion (L1-L2 femoral) []?  []?  bilat 4/5   Knee extension (L2-L4 femoral) []?  []?      Knee flexion (S1 sciatic)         Dorsiflexion (L4-L5 deep peroneal) []?  []?      Great Toe Ext (L5 deep peroneal nerve) []?  []?      Ankle Eversion (S1-S2 super peroneal) []?  []?      Ankle PF(S1-S2 tibial) []?  []?      Multifidus []?  []?      Transverse Ab []?  []?          RESTRICTIONS/PRECAUTIONS: cervical fusion    Exercises/Interventions:     Therapeutic Ex (46441)   Min: 25 min sets/sec reps notes      piriformis stretch bilat 30 4    SKTC stretch bilat 30 4    bilat   LTR 2 10    Standing band pull down  20 green, at forearm   Standing band lateral pull  20 Red, at forearm   1/2 kneeling down chop   Red band   Wall march  20 airex   Standing hip abd  20 airex   Standing hip ext  20 airex   Sit to stand from chair- no hands  10 + airex and + yellow weighted ball   Seated march/kickout      Seated LS activation      BOSU lunge + slosh tube resting on forearms  10/ea    Glute Stretch      SLS Ball/wall glute      Manual Intervention (92058) Min: 20 min      DN      Prone PA 1 10    GISTM/STM  0    Lumbar Manip      SI Manip      Hip  Mobs/ROM 1 10 Prone and with belt- B hips   Hip LA distraction            NMR re-education (48878)   Min:      Mf Activation- re-ed      TrA Re-ed activation      Glute Max re-ed activation      Prone aruna Anderson            Therapeutic Activity (75074) Min:                                Access Code: [email or web address]   URL: [email or web address]   Date: 03/10/2021   Prepared by: Bobby Handler     Exercises   Supine Lower Trunk Rotation - 10 reps - 2 sets - 1x daily - 7x weekly   Supine March - 10 reps - 2 sets - 1x daily - 7x weekly   Bent Knee Fallouts with Alternating Legs - 10 reps - 2 sets - 1x daily - 7x weekly   Standing March with Counter Support - 10 reps - 2 sets - 1x daily - 7x weekly   Standing Hip Abduction - 10 reps - 2 sets - 1x daily - 7x weekly       Therapeutic Exercise and NMR EXR  [x] (31409) Provided verbal/tactile cueing for activities related to strengthening, flexibility, endurance, ROM  for improvements in proximal hip and core control with self care, mobility, lifting and ambulation. [x] (12182) Provided verbal/tactile cueing for activities related to improving balance, coordination, kinesthetic sense, posture, motor skill, proprioception  to assist with core control in self care, mobility, lifting, and ambulation.      Therapeutic Activities:    [] (26855 or 44034) Provided verbal/tactile cueing for activities related to improving balance, coordination, kinesthetic sense, posture, motor skill, proprioception and motor activation to allow for proper function  with self care and ADLs  [] (63296) Provided training and instruction to the patient for proper core and proximal hip recruitment and positioning with ambulation re-education     Home Exercise Program:    [x] (73880) Reviewed/Progressed HEP activities related to strengthening, flexibility, endurance, ROM of core, proximal hip and LE for functional self-care, mobility, lifting and ambulation   [] (71043) Reviewed/Progressed HEP activities related to improving balance, coordination, kinesthetic sense, posture, motor skill, proprioception of core, proximal hip and LE for self care, mobility, lifting, and ambulation      Manual Treatments:  PROM / STM / Oscillations-Mobs:  G-I, II, III, IV (PA's, Inf., Post.)  [x] (75566) Provided manual therapy to mobilize proximal hip and LS spine soft tissue/joints for the purpose of modulating pain, promoting relaxation,  increasing ROM, reducing/eliminating soft tissue swelling/inflammation/restriction, improving soft tissue extensibility and allowing for proper ROM for normal function with self care, mobility, lifting and ambulation. Modalities:       Charges:  Timed Code Treatment Minutes: 45   Total Treatment Minutes: 45     [] EVAL (LOW) 30187 (typically 20 minutes face-to-face)  [] EVAL (MOD) 04663 (typically 30 minutes face-to-face)  [] EVAL (HIGH) 63360 (typically 45 minutes face-to-face)  [] RE-EVAL     [x] QG(53739) x  2   [] DRY NEEDLE 1 OR 2 MUSCLES  [] NMR (41555) x     [] DRY NEEDLE 3+ MUSCLES  [x] Manual (87632) x  1     [] TA (80665) x     [] Mech Traction (43024)  [] ES(attended) (81204)     [] ES (un) (52041):   [] VASO (67455)  [] Other:    If BWC Please Indicate Time In/Out  CPT Code Time in Time out                                     GOALS:  Patient stated goal: decrease pain  [x] Progressing: [] Met: [] Not Met: [] Adjusted  Therapist goals for Patient:   Short Term Goals: To be achieved in: 2 weeks  1. Independent in HEP and progression per patient tolerance, in order to prevent re-injury. [] Progressing: [x] Met: [] Not Met: [] Adjusted  2. Patient will have a decrease in pain to facilitate improvement in movement, function, and ADLs as indicated by Functional Deficits. [x] Progressing: [] Met: [] Not Met: [] Adjusted    Long Term Goals: To be achieved in: 4 weeks  1. Disability index score of 10% or less for the HERVE to assist with reaching prior level of function. [] Progressing: [x] Met: [] Not Met: [] Adjusted  2. Patient will demonstrate increased AROM to WNL, good LS mobility, good hip ROM to allow for proper joint functioning as indicated by patients Functional Deficits. [x] Progressing: [] Met: [] Not Met: [] Adjusted  3. Patient will demonstrate an increase in Strength to good proximal hip and core activation to allow for proper functional mobility as indicated by patients Functional Deficits. [x] Progressing: [] Met: [] Not Met: [] Adjusted  4. Patient will return to walking, standing, sitting functional activities without increased symptoms or restriction.    [x] Progressing: [] Met: [] Not Met: [] Adjusted  5. Patient will report being able to walk dog without increased symptoms or restriction. (patient specific functional goal)    [x] Progressing: [] Met: [] Not Met: [] Adjusted     ASSESSMENT:  Did well with manual and activation exercises. Improving overall hip mobility and with quicker return to normal range when stiff. Improving core and hip activation. Feeling improved at conclusion of session. Discussed possible further reduction in frequency if continues with decreased pain levels. Treatment/Activity Tolerance:   [x] Patient tolerated treatment well [] Patient limited by fatique  [] Patient limited by pain  [] Patient limited by other medical complications  [] Other:     Overall Progression Towards Functional goals/ Treatment Progress Update:  [x] Patient is progressing as expected towards functional goals listed. [] Progression is slowed due to complexities/Impairments listed. [] Progression has been slowed due to co-morbidities. [] Plan just implemented, too soon to assess goals progression <30days   [] Goals require adjustment due to lack of progress  [] Patient is not progressing as expected and requires additional follow up with physician  [] Other:    Prognosis for POC: [x] Good [] Fair  [] Poor    Patient requires continued skilled intervention: [x] Yes  [] No        PLAN: Continue mobility, endurance of proximal hip for walking. [x] Continue per plan of care [] Alter current plan (see comments)  [] Plan of care initiated [] Hold pending MD visit [] Discharge    Electronically signed by: Giacomo Villarreal PT    Note: If patient does not return for scheduled/recommended follow up visits, this note will serve as a discharge from care along with the most recent update on progress.

## 2021-04-07 ENCOUNTER — HOSPITAL ENCOUNTER (OUTPATIENT)
Dept: PHYSICAL THERAPY | Age: 66
Setting detail: THERAPIES SERIES
Discharge: HOME | End: 2021-04-07
Payer: MEDICARE

## 2021-04-07 PROCEDURE — 97110 THERAPEUTIC EXERCISES: CPT

## 2021-04-07 PROCEDURE — 97140 MANUAL THERAPY 1/> REGIONS: CPT

## 2021-04-07 NOTE — FLOWSHEET NOTE
bilaterally- 50    Piriformis limited limited     Renny test                       Myotomes/Strength Normal Abnormal Comments   [x]? ALL NORMAL         Hip Abd     R 4/5, L 4-/5   Hip Ext     bilat 3+/5   Hip flexion (L1-L2 femoral) []?  []?  bilat 4/5   Knee extension (L2-L4 femoral) []?  []?      Knee flexion (S1 sciatic)         Dorsiflexion (L4-L5 deep peroneal) []?  []?      Great Toe Ext (L5 deep peroneal nerve) []?  []?      Ankle Eversion (S1-S2 super peroneal) []?  []?      Ankle PF(S1-S2 tibial) []?  []?      Multifidus []?  []?      Transverse Ab []?  []?          RESTRICTIONS/PRECAUTIONS: cervical fusion    Exercises/Interventions:     Therapeutic Ex (47263)   Min: 20 min sets/sec reps notes      piriformis stretch bilat 30 4    SKTC stretch bilat 30 4    bilat      Standing band pull down  20 green, at forearm   Standing band lateral pull  20 Red, at forearm   1/2 kneeling down chop   Red band   Wall march  20 airex   Standing hip abd  20 airex   Standing hip ext  20 airex   Sit to stand from chair- no hands  10 + airex and + yellow weighted ball   Seated march/kickout      Seated LS activation      BOSU lunge + slosh tube resting on forearms  10/ea    Glute Stretch      SLS Ball/wall glute      Manual Intervention (41190) Min: 30 min      L knee TF joint and PF mobs 1 5    Prone PA 1 10    GISTM/STM  5    Lumbar Manip      SI Manip      Hip  Mobs/ROM 1 10 Prone and with belt- B hips   Hip LA distraction            NMR re-education (95837)   Min:      Mf Activation- re-ed      TrA Re-ed activation      Glute Max re-ed activation      Prone aruna      [place]            Therapeutic Activity (13559) Min:                                Access Code: [email or web address]   URL: [email or web address]   Date: 03/10/2021   Prepared by: Amie Cullen     Exercises   Supine Lower Trunk Rotation - 10 reps - 2 sets - 1x daily - 7x weekly   Supine March - 10 reps - 2 sets - 1x daily - 7x weekly   Bent Knee Fallouts with Alternating Legs - 10 reps - 2 sets - 1x daily - 7x weekly   Standing March with Counter Support - 10 reps - 2 sets - 1x daily - 7x weekly   Standing Hip Abduction - 10 reps - 2 sets - 1x daily - 7x weekly       Therapeutic Exercise and NMR EXR  [x] (40868) Provided verbal/tactile cueing for activities related to strengthening, flexibility, endurance, ROM  for improvements in proximal hip and core control with self care, mobility, lifting and ambulation. [x] (91936) Provided verbal/tactile cueing for activities related to improving balance, coordination, kinesthetic sense, posture, motor skill, proprioception  to assist with core control in self care, mobility, lifting, and ambulation.      Therapeutic Activities:    [] (86939 or 94947) Provided verbal/tactile cueing for activities related to improving balance, coordination, kinesthetic sense, posture, motor skill, proprioception and motor activation to allow for proper function  with self care and ADLs  [] (81371) Provided training and instruction to the patient for proper core and proximal hip recruitment and positioning with ambulation re-education     Home Exercise Program:    [x] (17563) Reviewed/Progressed HEP activities related to strengthening, flexibility, endurance, ROM of core, proximal hip and LE for functional self-care, mobility, lifting and ambulation   [] (73546) Reviewed/Progressed HEP activities related to improving balance, coordination, kinesthetic sense, posture, motor skill, proprioception of core, proximal hip and LE for self care, mobility, lifting, and ambulation      Manual Treatments:  PROM / STM / Oscillations-Mobs:  G-I, II, III, IV (PA's, Inf., Post.)  [x] (77438) Provided manual therapy to mobilize proximal hip and LS spine soft tissue/joints for the purpose of modulating pain, promoting relaxation,  increasing ROM, reducing/eliminating soft tissue swelling/inflammation/restriction, improving soft tissue extensibility and allowing for proper ROM for normal function with self care, mobility, lifting and ambulation. Modalities:       Charges:  Timed Code Treatment Minutes: 50   Total Treatment Minutes: 50     [] EVAL (LOW) 86687 (typically 20 minutes face-to-face)  [] EVAL (MOD) 12533 (typically 30 minutes face-to-face)  [] EVAL (HIGH) 21716 (typically 45 minutes face-to-face)  [] RE-EVAL     [x] KHANNA(98141) x  1   [] DRY NEEDLE 1 OR 2 MUSCLES  [] NMR (92810) x     [] DRY NEEDLE 3+ MUSCLES  [x] Manual (14794) x  2    [] TA (41600) x     [] Mech Traction (69990)  [] ES(attended) (09292)     [] ES (un) (02741):   [] VASO (03816)  [] Other:    If BWC Please Indicate Time In/Out  CPT Code Time in Time out                                     GOALS:  Patient stated goal: decrease pain  [x] Progressing: [] Met: [] Not Met: [] Adjusted  Therapist goals for Patient:   Short Term Goals: To be achieved in: 2 weeks  1. Independent in HEP and progression per patient tolerance, in order to prevent re-injury. [] Progressing: [x] Met: [] Not Met: [] Adjusted  2. Patient will have a decrease in pain to facilitate improvement in movement, function, and ADLs as indicated by Functional Deficits. [x] Progressing: [] Met: [] Not Met: [] Adjusted    Long Term Goals: To be achieved in: 4 weeks  1. Disability index score of 10% or less for the HERVE to assist with reaching prior level of function. [] Progressing: [x] Met: [] Not Met: [] Adjusted  2. Patient will demonstrate increased AROM to WNL, good LS mobility, good hip ROM to allow for proper joint functioning as indicated by patients Functional Deficits. [x] Progressing: [] Met: [] Not Met: [] Adjusted  3. Patient will demonstrate an increase in Strength to good proximal hip and core activation to allow for proper functional mobility as indicated by patients Functional Deficits. [x] Progressing: [] Met: [] Not Met: [] Adjusted  4.  Patient will return to walking, standing, sitting functional activities visits, this note will serve as a discharge from care along with the most recent update on progress.

## 2021-04-14 ENCOUNTER — HOSPITAL ENCOUNTER (OUTPATIENT)
Dept: PHYSICAL THERAPY | Age: 66
Setting detail: THERAPIES SERIES
Discharge: HOME | End: 2021-04-14
Payer: MEDICARE

## 2021-04-14 PROCEDURE — 97140 MANUAL THERAPY 1/> REGIONS: CPT

## 2021-04-14 PROCEDURE — 97110 THERAPEUTIC EXERCISES: CPT

## 2021-04-14 NOTE — FLOWSHEET NOTE
Abd     R 4/5, L 4-/5   Hip Ext     bilat 3+/5   Hip flexion (L1-L2 femoral) []?  []?  bilat 4/5   Knee extension (L2-L4 femoral) []?  []?      Knee flexion (S1 sciatic)         Dorsiflexion (L4-L5 deep peroneal) []?  []?      Great Toe Ext (L5 deep peroneal nerve) []?  []?      Ankle Eversion (S1-S2 super peroneal) []?  []?      Ankle PF(S1-S2 tibial) []?  []?      Multifidus []?  []?      Transverse Ab []?  []?          RESTRICTIONS/PRECAUTIONS: cervical fusion    Exercises/Interventions:     Therapeutic Ex (63042)   Min: 20 min sets/sec reps notes   glute max- prone 10 10          bilat      Standing band pull down  20 green, at forearm   Standing band lateral pull  20 Red, at forearm   1/2 kneeling down chop   Red band   Wall march  20 airex   Standing hip abd  20 airex   Standing hip ext  20 airex   Sit to stand from chair- no hands  10 + airex and + yellow weighted ball   Seated march/kickout      Seated LS activation      BOSU lunge + slosh tube resting on forearms  10/ea    Glute Stretch      SLS Ball/wall glute      Manual Intervention (07094) Min: 25 min      L knee TF joint and PF mobs 1 0    Prone PA 1 10    GISTM/STM  5    Lumbar Manip      SI Manip      Hip  Mobs/ROM 1 10 Prone and with belt- B hips   Hip LA distraction            NMR re-education (16118)   Min:      Mf Activation- re-ed      TrA Re-ed activation      Glute Max re-ed activation      Prone aruna Anderson            Therapeutic Activity (32724) Min:                                Access Code: [email or web address]   URL: [email or web address]   Date: 03/10/2021   Prepared by: Lobo Urrutia     Exercises   Supine Lower Trunk Rotation - 10 reps - 2 sets - 1x daily - 7x weekly   Supine March - 10 reps - 2 sets - 1x daily - 7x weekly   Bent Knee Fallouts with Alternating Legs - 10 reps - 2 sets - 1x daily - 7x weekly   Standing March with Counter Support - 10 reps - 2 sets - 1x daily - 7x weekly   Standing Hip Abduction - 10 reps - 2 sets - 1x daily - 7x weekly       Therapeutic Exercise and NMR EXR  [x] (39234) Provided verbal/tactile cueing for activities related to strengthening, flexibility, endurance, ROM  for improvements in proximal hip and core control with self care, mobility, lifting and ambulation. [x] (58278) Provided verbal/tactile cueing for activities related to improving balance, coordination, kinesthetic sense, posture, motor skill, proprioception  to assist with core control in self care, mobility, lifting, and ambulation. Therapeutic Activities:    [] (47600 or 92569) Provided verbal/tactile cueing for activities related to improving balance, coordination, kinesthetic sense, posture, motor skill, proprioception and motor activation to allow for proper function  with self care and ADLs  [] (48800) Provided training and instruction to the patient for proper core and proximal hip recruitment and positioning with ambulation re-education     Home Exercise Program:    [x] (81685) Reviewed/Progressed HEP activities related to strengthening, flexibility, endurance, ROM of core, proximal hip and LE for functional self-care, mobility, lifting and ambulation   [] (76148) Reviewed/Progressed HEP activities related to improving balance, coordination, kinesthetic sense, posture, motor skill, proprioception of core, proximal hip and LE for self care, mobility, lifting, and ambulation      Manual Treatments:  PROM / STM / Oscillations-Mobs:  G-I, II, III, IV (PA's, Inf., Post.)  [x] (37867) Provided manual therapy to mobilize proximal hip and LS spine soft tissue/joints for the purpose of modulating pain, promoting relaxation,  increasing ROM, reducing/eliminating soft tissue swelling/inflammation/restriction, improving soft tissue extensibility and allowing for proper ROM for normal function with self care, mobility, lifting and ambulation.      Modalities:       Charges:  Timed Code Treatment Minutes: 45   Total Treatment Minutes: 45 (patient specific functional goal)    [x] Progressing: [] Met: [] Not Met: [] Adjusted     ASSESSMENT:  Patient with increased bilateral hip restriction. Tolerated all mobilization well with good improvement in hips. Continued improvement in strength and core control. Feeling improved at conclusion of session. Treatment/Activity Tolerance:   [x] Patient tolerated treatment well [] Patient limited by fatique  [] Patient limited by pain  [] Patient limited by other medical complications  [] Other:     Overall Progression Towards Functional goals/ Treatment Progress Update:  [x] Patient is progressing as expected towards functional goals listed. [] Progression is slowed due to complexities/Impairments listed. [] Progression has been slowed due to co-morbidities. [] Plan just implemented, too soon to assess goals progression <30days   [] Goals require adjustment due to lack of progress  [] Patient is not progressing as expected and requires additional follow up with physician  [] Other:    Prognosis for POC: [x] Good [] Fair  [] Poor    Patient requires continued skilled intervention: [x] Yes  [] No        PLAN: Continue mobility, endurance of proximal hip for walking. [x] Continue per plan of care [] Alter current plan (see comments)  [] Plan of care initiated [] Hold pending MD visit [] Discharge    Electronically signed by: Michael Jeffries PT    Note: If patient does not return for scheduled/recommended follow up visits, this note will serve as a discharge from care along with the most recent update on progress.

## 2021-04-21 ENCOUNTER — HOSPITAL ENCOUNTER (OUTPATIENT)
Dept: PHYSICAL THERAPY | Age: 66
Setting detail: THERAPIES SERIES
Discharge: HOME | End: 2021-04-21
Payer: MEDICARE

## 2021-04-21 ENCOUNTER — OFFICE VISIT (OUTPATIENT)
Dept: ORTHOPEDIC SURGERY | Age: 66
End: 2021-04-21
Payer: MEDICARE

## 2021-04-21 VITALS — HEIGHT: 62 IN | WEIGHT: 147 LBS | BODY MASS INDEX: 27.05 KG/M2

## 2021-04-21 DIAGNOSIS — M25.562 LEFT KNEE PAIN, UNSPECIFIED CHRONICITY: Primary | ICD-10-CM

## 2021-04-21 PROCEDURE — 99213 OFFICE O/P EST LOW 20 MIN: CPT | Performed by: ORTHOPAEDIC SURGERY

## 2021-04-21 PROCEDURE — G8417 CALC BMI ABV UP PARAM F/U: HCPCS | Performed by: ORTHOPAEDIC SURGERY

## 2021-04-21 PROCEDURE — 97161 PT EVAL LOW COMPLEX 20 MIN: CPT

## 2021-04-21 PROCEDURE — 97110 THERAPEUTIC EXERCISES: CPT

## 2021-04-21 PROCEDURE — 3017F COLORECTAL CA SCREEN DOC REV: CPT | Performed by: ORTHOPAEDIC SURGERY

## 2021-04-21 PROCEDURE — 1036F TOBACCO NON-USER: CPT | Performed by: ORTHOPAEDIC SURGERY

## 2021-04-21 PROCEDURE — G8427 DOCREV CUR MEDS BY ELIG CLIN: HCPCS | Performed by: ORTHOPAEDIC SURGERY

## 2021-04-21 PROCEDURE — 4040F PNEUMOC VAC/ADMIN/RCVD: CPT | Performed by: ORTHOPAEDIC SURGERY

## 2021-04-21 PROCEDURE — 1123F ACP DISCUSS/DSCN MKR DOCD: CPT | Performed by: ORTHOPAEDIC SURGERY

## 2021-04-21 NOTE — PLAN OF CARE
Elis Onofre  Phone: [phone]   Fax:     [phone]                                                       Physical Therapy Certification    Dear Referring Practitioner: Dr Randal Ruby,    We had the pleasure of evaluating the following patient for physical therapy services at [address] [place] [address]. A summary of our findings can be found in the initial assessment below. This includes our plan of care. If you have any questions or concerns regarding these findings, please do not hesitate to contact me at the office phone number checked above. Thank you for the referral.       Physician Signature:_______________________________Date:__________________  By signing above (or electronic signature), therapists plan is approved by physician      Patient: Boyd Carroll   : 1955   MRN: [id number]  Referring Physician: Referring Practitioner: Dr Randal Ruby      Evaluation Date: 2021      Medical Diagnosis Information:  Diagnosis: medial meniscus tear, L knee pain   Treatment Diagnosis: L knee pain M25.562                                         Insurance information: PT Insurance Information: Medicare/[facility]     Precautions/ Contra-indications: none  Latex Allergy:  [x]NO      []YES  Preferred Language for Healthcare:   [x]English       []other:    C-SSRS Triggered by Intake questionnaire (Past 2 wk assessment ):   [x] No, Questionnaire did not trigger screening.   [] Yes, Patient intake triggered C-SSRS Screening      [] C-SSRS Screening completed  [] PCP notified via Epic     SUBJECTIVE: Patient stated complaint:Patient reports that his L knee has been bothering him recently with managing stairs and sitting for long periods. Has history of knee bothering him on/off for a few years, but has noticed more recently. Has hx of med meniscus tear.  Reports that his back is feeling pretty good. Relevant Medical History:n/a  Functional Scale/Score:LEFS 37.5% disability    Pain Scale: 3-4/10  Easing factors: resting  Provocative factors: going up stairs, sitting in car for long periods, sleeping    Type: [x]Constant   []Intermittent  []Radiating []Localized []other:     Numbness/Tingling: none    Occupation/School: insurance sales    Living Status/Prior Level of Function: Independent with ADLs and IADLs, walking dogs    OBJECTIVE:     ROM LEFT RIGHT   HIP Flex     HIP Abd     HIP Ext     HIP IR     HIP ER     Knee ext 0 0   Knee Flex 125 125   Strength  LEFT RIGHT   HIP Flexors 17.9 21.6   HIP Abductors 25.3 28.4   HIP Ext 21.0 21.0   Hip ER     Knee EXT (quad) 25.9 32.0   Knee Flex (HS) 39.2 36.4     Reflexes/Sensation:    [x]Dermatomes/Myotomes intact    [x]Reflexes equal and normal bilaterally   []Other:    Joint mobility:    []Normal    [x]Hypo   []Hyper    Palpation: tender to palpate along medial and lateral joint line    Functional Mobility/Transfers: limited in sleeping, stairs and sitting for long periods for driving    Posture: WNL    Bandages/Dressings/Incisions: n/a    Gait: (include devices/WB status) WNL    Orthopedic Special Tests: n/a                       [x] Patient history, allergies, meds reviewed. Medical chart reviewed. See intake form. Review Of Systems (ROS):  [x]Performed Review of systems (Integumentary, CardioPulmonary, Neurological) by intake and observation. Intake form has been scanned into medical record. Patient has been instructed to contact their primary care physician regarding ROS issues if not already being addressed at this time.       Co-morbidities/Complexities (which will affect course of rehabilitation):   []None           Arthritic conditions   []Rheumatoid arthritis (M05.9)  [x]Osteoarthritis (M19.91)   Cardiovascular conditions   [x]Hypertension (I10)  []Hyperlipidemia (E78.5)  []Angina pectoris (I20)  []Atherosclerosis (I70)  []CVA Musculoskeletal conditions   []Disc pathology   []Congenital spine pathologies   []Prior surgical intervention  []Osteoporosis (M81.8)  []Osteopenia (M85.8)   Endocrine conditions   []Hypothyroid (E03.9)  []Hyperthyroid Gastrointestinal conditions   []Constipation (E75.95)   Metabolic conditions   []Morbid obesity (E66.01)  []Diabetes type 1(E10.65) or 2 (E11.65)   []Neuropathy (G60.9)     Pulmonary conditions   []Asthma (J45)  []Coughing   []COPD (J44.9)   Psychological Disorders  [x]Anxiety (F41.9)  []Depression (F32.9)   []Other:   []Other:          Barriers to/and or personal factors that will affect rehab potential:              []Age  []Sex    []Smoker              []Motivation/Lack of Motivation                        []Co-Morbidities              []Cognitive Function, education/learning barriers              []Environmental, home barriers              []profession/work barriers  []past PT/medical experience  []other:  Justification:     Falls Risk Assessment (30 days):   [x] Falls Risk assessed and no intervention required. [] Falls Risk assessed and Patient requires intervention due to being higher risk   TUG score (>12s at risk):     [] Falls education provided, including         ASSESSMENT: Patient is a 79 yo male who presents to therapy with acute flare up of  L knee pain. Upon assessment, patient with mild soft tissue restrictions, as well as decreased strength and NM control of L knee. Patient will benefit from further skilled PT services to address L knee pain.       Functional Impairments:     []Noted lumbar/proximal hip/LE hypomobility   []Decreased LE functional ROM   []Decreased core/proximal hip strength and neuromuscular control   [x]Decreased LE functional strength   [x]Reduced balance/proprioceptive control   []other:      Functional Activity Limitations (from functional questionnaire and intake)   []Reduced ability to tolerate prolonged functional positions   []Reduced ability or difficulty with changes of positions or transfers between positions   []Reduced ability to maintain good posture and demonstrate good body mechanics with sitting, bending, and lifting   [x]Reduced ability to sleep   [x] Reduced ability or tolerance with driving and/or computer work   []Reduced ability to perform lifting, carrying tasks   []Reduced ability to squat   []Reduced ability to forward bend   [x]Reduced ability to ambulate prolonged functional periods/distances/surfaces   [x]Reduced ability to ascend/descend stairs   []Reduced ability to run, hop or jump   []other:     Participation Restrictions   []Reduced participation in self care activities   [x]Reduced participation in home management activities   []Reduced participation in work activities   [x]Reduced participation in social activities. []Reduced participation in sport activities. Classification :    []Signs/symptoms consistent with post-surgical status including decreased ROM, strength and function.    []Signs/symptoms consistent with joint sprain/strain   []Signs/symptoms consistent with patella-femoral syndrome   []Signs/symptoms consistent with knee OA/hip OA   [x]Signs/symptoms consistent with internal derangement of knee/Hip   []Signs/symptoms consistent with functional hip weakness/NMR control      []Signs/symptoms consistent with tendinitis/tendinosis    []signs/symptoms consistent with pathology which may benefit from Dry needling      []other:      Prognosis/Rehab Potential:      []Excellent   [x]Good    []Fair   []Poor    Tolerance of evaluation/treatment:    []Excellent   [x]Good    []Fair   []Poor    Physical Therapy Evaluation Complexity Justification  [x] A history of present problem with:  [x] no personal factors and/or comorbidities that impact the plan of care;  []1-2 personal factors and/or comorbidities that impact the plan of care  []3 personal factors and/or comorbidities that impact the plan of care  [x] An examination of body systems using standardized tests and measures addressing any of the following: body structures and functions (impairments), activity limitations, and/or participation restrictions;:  [x] a total of 1-2 or more elements   [] a total of 3 or more elements   [] a total of 4 or more elements   [x] A clinical presentation with:  [x] stable and/or uncomplicated characteristics   [] evolving clinical presentation with changing characteristics  [] unstable and unpredictable characteristics;   [x] Clinical decision making of [x] low, [] moderate, [] high complexity using standardized patient assessment instrument and/or measurable assessment of functional outcome. [x] EVAL (LOW) 67799 (typically 20 minutes face-to-face)  [] EVAL (MOD) 37614 (typically 30 minutes face-to-face)  [] EVAL (HIGH) 59124 (typically 45 minutes face-to-face)  [] RE-EVAL     PLAN:  Frequency/Duration:  1-2 days per week for 6 Weeks:  Interventions:  [x]  Therapeutic exercise including: strength training, ROM, for Lower extremity and core   [x]  NMR activation and proprioception for LE, Glutes and Core   [x]  Manual therapy as indicated for LE, Hip and spine to include: Dry Needling/IASTM, STM, PROM, Gr I-IV mobilizations, manipulation. [x] Modalities as needed that may include: thermal agents, E-stim, Biofeedback, US, iontophoresis as indicated  [x] Patient education on joint protection, postural re-education, activity modification, progression of HEP. HEP instruction: (see scanned forms)    GOALS:  Patient stated goal: reduce knee pain   [] Progressing: [] Met: [] Not Met: [] Adjusted    Therapist goals for Patient:   Short Term Goals: To be achieved in: 2 weeks  1. Independent in HEP and progression per patient tolerance, in order to prevent re-injury. [] Progressing: [] Met: [] Not Met: [] Adjusted  2.  Patient will have a decrease in pain to facilitate improvement in movement, function, and ADLs as indicated by Functional Deficits. [] Progressing: [] Met: [] Not Met: [] Adjusted    Long Term Goals: To be achieved in: 6 weeks  1. Disability index score of 25% or less for the LEFS to assist with reaching prior level of function. [] Progressing: [] Met: [] Not Met: [] Adjusted  2. Patient will demonstrate an increase in Strength to good proximal hip strength and control, within 5lb HHD in LE to allow for proper functional mobility as indicated by patients Functional Deficits. [] Progressing: [] Met: [] Not Met: [] Adjusted  3. Patient will return to standing and sitting functional activities without increased symptoms or restriction. [] Progressing: [] Met: [] Not Met: [] Adjusted  4.  Patient will report being able to perform stairs without increased symptoms or restriction. (patient specific functional goal)    [] Progressing: [] Met: [] Not Met: [] Adjusted     Electronically signed by:  Ginger Hall, PT

## 2021-04-21 NOTE — FLOWSHEET NOTE
[facility]  [address] [facility] [address]  Phone: [phone] Fax: [phone]    Physical Therapy Treatment Note/ Progress Report:     Date:  2021    Patient Name:  Lucas Glover    :  1955  MRN: [id number]  Restrictions/Precautions:    Medical/Treatment Diagnosis Information:  Diagnosis: medial meniscus tear, L knee pain  Treatment Diagnosis: L knee pain L40.906  Insurance/Certification information:  PT Insurance Information: [facility]  Physician Information:  Referring Practitioner: Dr Yessy Renae of care signed (Y/N):     Date of Patient follow up with Physician:      Progress Report: [x]  Yes  []  No     Date Range for reporting period:  Beginnin2021  Ending:  -    Progress report due (10 Rx/or 30 days whichever is less):      Recertification due (POC duration/ or 90 days whichever is less): 6/15/2021     Visit # Insurance Allowable Auth Needed   1 Medicare/[facility] (med guidelines) []Yes   [x]No     Latex Allergy:  [x]NO      []YES  Preferred Language for Healthcare:   [x]English       []other:  Functional Scale: LEFS 37.5% disability Date assessed:2021    Pain level:  3-4/10     SUBJECTIVE:  See eval    OBJECTIVE: See eval   Observation:    Test measurements:      RESTRICTIONS/PRECAUTIONS: none    Exercises/Interventions:     Therapeutic Ex (08612)   Min: 15 Sets/sec Reps Notes/CUES   Retro Stepper/BIKE      BOSU lunge 10 10                                  Hip Ext Monika Sequin      BOSU fwd/side lunge      BOSU squat      Leg Press Iso/Con/Ecc 0-   *   Cybex HS curl      TKE      Glute side walks 2 2    RDL      Slide Lunge 5 10    Slide HS eccentrics      Step ups/ecc step down 1 15    Swissball wall rolls- in SLS- hip drive      Quad hip ext/wall-ball rolls                  Manual Intervention (89775)  Min:      Knee mobs/PROM      Tib/Fem Mobs      Patella Mobs      Ankle mobs                  NMR re-education (74635)  Min:   CUES NEEDED   Armenian/Biofeedback 10/10      BFR      G. Med activation      Hip Ext full ROM/ G. Activation      Bosu Bal and Prop- G Med      Single leg stance/Balance/Prop      Bosu Retro G. Med act      Prone Hip froggers- sliders/elevated            Therapeutic Activity (43583)  Min:      Ladders      Plyos      Dynamic Balance                            Therapeutic Exercise and NMR EXR  [x] (00413) Provided verbal/tactile cueing for activities related to strengthening, flexibility, endurance, ROM for improvements in LE, proximal hip, and core control with self care, mobility, lifting, ambulation. [x] (57568) Provided verbal/tactile cueing for activities related to improving balance, coordination, kinesthetic sense, posture, motor skill, proprioception  to assist with LE, proximal hip, and core control in self care, mobility, lifting, ambulation and eccentric single leg control.      NMR and Therapeutic Activities:    [x] (85332 or 38979) Provided verbal/tactile cueing for activities related to improving balance, coordination, kinesthetic sense, posture, motor skill, proprioception and motor activation to allow for proper function of core, proximal hip and LE with self care and ADLs and functional mobility.   [] (72341) Gait Re-education- Provided training and instruction to the patient for proper LE, core and proximal hip recruitment and positioning and eccentric body weight control with ambulation re-education including up and down stairs     Home Exercise Program:    [x] (06957) Reviewed/Progressed HEP activities related to strengthening, flexibility, endurance, ROM of core, proximal hip and LE for functional self-care, mobility, lifting and ambulation/stair navigation   [] (85582)Reviewed/Progressed HEP activities related to improving balance, coordination, kinesthetic sense, posture, motor skill, proprioception of core, proximal hip and LE for self care, mobility, lifting, and ambulation/stair navigation      Manual Treatments:  PROM / STM / Oscillations-Mobs:  G-I, II, III, IV (PA's, Inf., Post.)  [x] (73567) Provided manual therapy to mobilize LE, proximal hip and/or LS spine soft tissue/joints for the purpose of modulating pain, promoting relaxation,  increasing ROM, reducing/eliminating soft tissue swelling/inflammation/restriction, improving soft tissue extensibility and allowing for proper ROM for normal function with self care, mobility, lifting and ambulation. Modalities:     [] GAME READY (VASO)- for significant edema, swelling, pain control. Charges:  Timed Code Treatment Minutes: 15   Total Treatment Minutes: 45      [x] EVAL (LOW) 84955 (typically 20 minutes face-to-face)  [] EVAL (MOD) 50188 (typically 30 minutes face-to-face)  [] EVAL (HIGH) 75777 (typically 45 minutes face-to-face)  [] RE-EVAL     [x] MS(26974) x  1   [] DRY NEEDLE 1 OR 2 MUSCLES  [] NMR (71792) x     [] DRY NEEDLE 3+ MUSCLES  [] Manual (41913) x       [] TA (49525) x     [] Mech Traction (31861)  [] ES(attended) (07536)     [] ES (un) (72063):   [] VASO (34751)  [] Other:    If [facility] Please Indicate Time In/Out  CPT Code Time in Time out                                   GOALS:  Patient stated goal: reduce knee pain   [] Progressing: [] Met: [] Not Met: [] Adjusted    Therapist goals for Patient:   Short Term Goals: To be achieved in: 2 weeks  1. Independent in HEP and progression per patient tolerance, in order to prevent re-injury. [] Progressing: [] Met: [] Not Met: [] Adjusted  2. Patient will have a decrease in pain to facilitate improvement in movement, function, and ADLs as indicated by Functional Deficits. [] Progressing: [] Met: [] Not Met: [] Adjusted    Long Term Goals: To be achieved in: 6 weeks  1. Disability index score of 25% or less for the LEFS to assist with reaching prior level of function. [] Progressing: [] Met: [] Not Met: [] Adjusted  2.  Patient will demonstrate an

## 2021-04-22 NOTE — PROGRESS NOTES
4/21/2021     Reason for visit:  Left knee pain    History of Present Illness: The patient is a 40-year-old male who presents for evaluation of his left knee. He reports on and off left knee pain. No traumatic injury. He localized the pain to the anterior as well as the medial aspect of the knee. The pain is made worse with standing for prolonged periods of time, walking, stairs. No catching or locking. No instability. Of note, according to him he underwent a knee arthroscopy back in 2005. Medical History:  Past Medical History:   Diagnosis Date    Anesthesia     F/H  of psuedocholenesterase  intolerance    Arthritis     Hyperlipidemia     Hypertension       Past Surgical History:   Procedure Laterality Date    CERVICAL FUSION      FINGER TRIGGER RELEASE      several    HAND TENDON SURGERY Left 4/15/2019    LEFT THUMB CARPOMETACARPAL ARTHROPALSTY INCLUDING TRAPEZIECTOMY, LIGAMENT RECONSTRUCTION AND TENDON INTERPOSITION WITH FLEXOR CARPI RADIALIS TENDON performed by Maximiliano Youngblood MD at [address] [place] [address] Left       No family history on file.    Social History     Socioeconomic History    Marital status:      Spouse name: Not on file    Number of children: Not on file    Years of education: Not on file    Highest education level: Not on file   Occupational History    Not on file   Social Needs    Financial resource strain: Not on file    Food insecurity     Worry: Not on file     Inability: Not on file    Transportation needs     Medical: Not on file     Non-medical: Not on file   Tobacco Use    Smoking status: Never Smoker    Smokeless tobacco: Never Used   Substance and Sexual Activity    Alcohol use: Yes     Comment: 1 to 2 drinks/week    Drug use: Never    Sexual activity: Not on file   Lifestyle    Physical activity     Days per week: Not on file     Minutes per session: Not on file    Stress: Not on file   Relationships    Social connections     Talks on phone: Not on file     Gets together: Not on file     Attends Spiritism service: Not on file     Active member of club or organization: Not on file     Attends meetings of clubs or organizations: Not on file     Relationship status: Not on file    Intimate partner violence     Fear of current or ex partner: Not on file     Emotionally abused: Not on file     Physically abused: Not on file     Forced sexual activity: Not on file   Other Topics Concern    Not on file   Social History Narrative    Not on file      Current Outpatient Medications on File Prior to Visit   Medication Sig Dispense Refill    methocarbamol (ROBAXIN) 500 MG tablet Take 500 mg by mouth daily      acetaminophen (TYLENOL) 500 MG tablet Take 1,000 mg by mouth as needed for Pain (pt rarely takes)      Coenzyme Q10 (COQ-10) 200 MG CAPS Take 1 capsule by mouth daily      traMADol (ULTRAM) 50 MG tablet Take 50 mg by mouth 2 times daily.  Cinnamon 500 MG CAPS Take 2 capsules by mouth daily      ondansetron (ZOFRAN) 4 MG tablet Take 1 tablet by mouth every 8 hours as needed for Nausea 20 tablet 0    diclofenac sodium 1 % GEL Apply 2 g topically 2 times daily      Cholecalciferol (VITAMIN D) 2000 units CAPS capsule Take 1 capsule by mouth daily       SUMAtriptan (IMITREX) 100 MG tablet Take 100 mg by mouth once as needed for Migraine      amLODIPine-benazepril (LOTREL) 5-10 MG per capsule Take 1 capsule by mouth daily.  fexofenadine (ALLEGRA) 180 MG tablet Take 180 mg by mouth nightly       atorvastatin (LIPITOR) 10 MG tablet Take 10 mg by mouth nightly       BUSPIRONE HCL PO Take 10 mg by mouth 2 times daily       fluticasone (FLONASE) 50 MCG/ACT nasal spray 2 sprays by Nasal route nightly       ALPRAZolam (XANAX) 1 MG tablet Take 1 mg by mouth daily.        Omeprazole (PRILOSEC PO) Take 20 mg by mouth daily       Chromium 1000 MCG TABS Take 2 tablets by mouth daily       meloxicam (MOBIC) 15 MG tablet Take 15 mg by mouth daily. No current facility-administered medications on file prior to visit. Allergies   Allergen Reactions    Bee Venom Anaphylaxis    Anectine [Succinylcholine]     Blue Dyes (Parenteral)      In clothing  brilliant blue FCF (fd&Blue 31         Review of Systems:  Constitutional: Patient is adequately groomed with no evidence of malnutrition  Mental Status: The patient is oriented to time, place and person. The patient's mood and affect are appropriate. Lymphatic: The lymphatic examination bilaterally reveals all areas to be without enlargement or induration. Vascular: Examination reveals no swelling or calf tenderness. Peripheral pulses are palpable and 2+. Neurological: The patient has good coordination. There is no weakness or sensory deficit. Skin:  Head/Neck: inspection reveals no rashes, ulcerations or lesions. Trunk: inspection reveals no rashes, ulcerations or lesions. Objective:  Ht 5' 2\" (1.575 m)   Wt 147 lb (66.7 kg)   BMI 26.89 kg/m²      Physical Exam:  The patient is well-appearing and in no apparent distress  Examination of the left knee   There is no effusion, no gross deformity or skin changes  Range of motion reveals 0 to 130  neg lachman, negative posterior drawer, no pain or laxity with varus or valgus stress at 0 degrees and 30 degrees of flexion  Mild medial joint line tenderness  5 out of 5 strength throughout distal muscle groups  Sensation is intact to light touch throughout all distributions  There is no calf swelling or tenderness  Palpable DP pulse, brisk cap refill, 2+ symmetric reflexes    Imaging:  X-rays of the left knee were reviewed. There is no fracture, dislocation, or other abnormality. MRI of the left knee was reviewed. Signal abnormality within the posterior horn of the medial meniscus that likely represents degeneration. No discrete tear noted. Early chondral thinning of the patellofemoral medial compartments.   Otherwise unremarkable MRI    Assessment:  Left knee pain    Plan:  I had a long discussion with the patient. Based on his x-rays and his MRI his knee overall appears to be quite healthy. I do not recommend surgical intervention at this time. I would recommend nonoperative management. Nonoperative treatment options include activity modification, anti-inflammatory medications, physical therapy, and injections. He wishes to proceed with physical therapy. He will return as needed in the future. Jamia Grimaldo MD            Orthopaedic Surgery Sports Medicine and [address] [facility]            Team Physician [facility] ([place]      Disclaimer: This note was dictated with voice recognition software. Though review and correction are routine, we apologize for any errors.

## 2021-04-28 ENCOUNTER — HOSPITAL ENCOUNTER (OUTPATIENT)
Dept: PHYSICAL THERAPY | Age: 66
Setting detail: THERAPIES SERIES
Discharge: HOME | End: 2021-04-28
Payer: MEDICARE

## 2021-04-28 PROCEDURE — 97140 MANUAL THERAPY 1/> REGIONS: CPT

## 2021-04-28 PROCEDURE — 97110 THERAPEUTIC EXERCISES: CPT

## 2021-04-28 NOTE — FLOWSHEET NOTE
[facility] [address] [facility]  Phone: [phone] Fax: [phone]    Physical Therapy Treatment Note/ Progress Report:     Date:  2021    Patient Name:  Francisco Oconnor    :  1955  MRN: [id number]  Restrictions/Precautions:    Medical/Treatment Diagnosis Information:  Diagnosis: medial meniscus tear, L knee pain  Treatment Diagnosis: L knee pain T72.309  Insurance/Certification information:  PT Insurance Information: [facility]  Physician Information:  Referring Practitioner: Dr Dave Mcdonough of care signed (Y/N):     Date of Patient follow up with Physician:      Progress Report: [x]  Yes  []  No     Date Range for reporting period:  Beginnin2021  Ending:  -    Progress report due (10 Rx/or 30 days whichever is less):      Recertification due (POC duration/ or 90 days whichever is less): 6/15/2021     Visit # Insurance Allowable Auth Needed   2 [facility] (med guidelines) []Yes   [x]No     Latex Allergy:  [x]NO      []YES  Preferred Language for Healthcare:   [x]English       []other:  Functional Scale: LEFS 37.5% disability Date assessed:2021    Pain level:  210     SUBJECTIVE:  Patient reports that knee is feeling pretty good. Had to jog/trot on it over the weekend to keep from losing dog and didn't bother. States that knee was pretty achy last night.      OBJECTIVE: See eval   Observation:    Test measurements:      RESTRICTIONS/PRECAUTIONS: none    Exercises/Interventions:     Therapeutic Ex (82092)   Min: 30 Sets/sec Reps Notes/CUES   Retro Stepper/BIKE      BOSU lunge 10 10     2 20                            Hip Ext /table      BOSU fwd/side lunge      BOSU squat 1 15    Leg Press Iso/Con/Ecc 0- 2 10 50 lb, 2 to 1   Cybex HS curl      TKE      Glute side walks 2 2 maroon   RDL      Slide Lunge 5 10    Slide HS eccentrics      Step ups/ecc step down 1 15 6 in   Swissball wall rolls- in SLS- hip drive      Quad hip ext/wall-ball rolls                  Manual Intervention (14916)  Min: 15 min      Knee mobs/PROM 1 5    Tib/Fem Mobs      Patella Mobs 1 5    Ankle mobs      IASTM 1 5          NMR re-education (64308)  Min:   CUES NEEDED   [place]/Biofeedback 10/10      BFR      G. Med activation      Hip Ext full ROM/ G. Activation      Bosu Bal and Prop- G Med      Single leg stance/Balance/Prop      Bosu Retro G. Med act      Prone Hip froggers- sliders/elevated            Therapeutic Activity (70828)  Min:      Ladders      Plyos      Dynamic Balance                            Therapeutic Exercise and NMR EXR  [x] (62296) Provided verbal/tactile cueing for activities related to strengthening, flexibility, endurance, ROM for improvements in LE, proximal hip, and core control with self care, mobility, lifting, ambulation. [x] (62917) Provided verbal/tactile cueing for activities related to improving balance, coordination, kinesthetic sense, posture, motor skill, proprioception  to assist with LE, proximal hip, and core control in self care, mobility, lifting, ambulation and eccentric single leg control.      NMR and Therapeutic Activities:    [x] (39526 or 30811) Provided verbal/tactile cueing for activities related to improving balance, coordination, kinesthetic sense, posture, motor skill, proprioception and motor activation to allow for proper function of core, proximal hip and LE with self care and ADLs and functional mobility.   [] (90775) Gait Re-education- Provided training and instruction to the patient for proper LE, core and proximal hip recruitment and positioning and eccentric body weight control with ambulation re-education including up and down stairs     Home Exercise Program:    [x] (85452) Reviewed/Progressed HEP activities related to strengthening, flexibility, endurance, ROM of core, proximal hip and LE for functional self-care, mobility, lifting and ambulation/stair navigation [] (58213)Reviewed/Progressed HEP activities related to improving balance, coordination, kinesthetic sense, posture, motor skill, proprioception of core, proximal hip and LE for self care, mobility, lifting, and ambulation/stair navigation      Manual Treatments:  PROM / STM / Oscillations-Mobs:  G-I, II, III, IV (PA's, Inf., Post.)  [x] (20669) Provided manual therapy to mobilize LE, proximal hip and/or LS spine soft tissue/joints for the purpose of modulating pain, promoting relaxation,  increasing ROM, reducing/eliminating soft tissue swelling/inflammation/restriction, improving soft tissue extensibility and allowing for proper ROM for normal function with self care, mobility, lifting and ambulation. Modalities:     [] GAME READY (VASO)- for significant edema, swelling, pain control. Charges:  Timed Code Treatment Minutes: 45   Total Treatment Minutes: 45      [] EVAL (LOW) 82420 (typically 20 minutes face-to-face)  [] EVAL (MOD) 19980 (typically 30 minutes face-to-face)  [] EVAL (HIGH) 87504 (typically 45 minutes face-to-face)  [] RE-EVAL     [x] HB(52818) x  2   [] DRY NEEDLE 1 OR 2 MUSCLES  [] NMR (42434) x     [] DRY NEEDLE 3+ MUSCLES  [x] Manual (43420) x 1      [] TA (71039) x     [] Mech Traction (53342)  [] ES(attended) (98965)     [] ES (un) (53479):   [] VASO (11921)  [] Other:    If [facility] Please Indicate Time In/Out  CPT Code Time in Time out                                   GOALS:  Patient stated goal: reduce knee pain   [] Progressing: [] Met: [] Not Met: [] Adjusted    Therapist goals for Patient:   Short Term Goals: To be achieved in: 2 weeks  1. Independent in HEP and progression per patient tolerance, in order to prevent re-injury. [] Progressing: [] Met: [] Not Met: [] Adjusted  2. Patient will have a decrease in pain to facilitate improvement in movement, function, and ADLs as indicated by Functional Deficits. [] Progressing: [] Met: [] Not Met: [] Adjusted    Long Term Goals:  To be achieved in: 6 weeks  1. Disability index score of 25% or less for the LEFS to assist with reaching prior level of function. [] Progressing: [] Met: [] Not Met: [] Adjusted  2. Patient will demonstrate an increase in Strength to good proximal hip strength and control, within 5lb HHD in LE to allow for proper functional mobility as indicated by patients Functional Deficits. [] Progressing: [] Met: [] Not Met: [] Adjusted  3. Patient will return to standing and sitting functional activities without increased symptoms or restriction. [] Progressing: [] Met: [] Not Met: [] Adjusted  4. Patient will report being able to perform stairs without increased symptoms or restriction. (patient specific functional goal)    [] Progressing: [] Met: [] Not Met: [] Adjusted    ASSESSMENT:  Patient with good tolerance to all ROM and strengthening today. Mild discomfort with slide lunge. Cues for improved form. Return to Play: (if applicable)   []  Stage 1: Intro to Strength   []  Stage 2: Return to Run and Strength   []  Stage 3: Return to Jump and Strength   []  Stage 4: Dynamic Strength and Agility   []  Stage 5: Sport Specific Training     []  Ready to Return to Play, Meets All Above Stages   []  Not Ready for Return to Sports   Comments:            Treatment/Activity Tolerance:  [x] Patient tolerated treatment well [] Patient limited by fatique  [] Patient limited by pain  [] Patient limited by other medical complications  [] Other:     Overall Progression Towards Functional goals/ Treatment Progress Update:  [] Patient is progressing as expected towards functional goals listed. [] Progression is slowed due to complexities/Impairments listed. [] Progression has been slowed due to co-morbidities.   [x] Plan just implemented, too soon to assess goals progression <30days   [] Goals require adjustment due to lack of progress  [] Patient is not progressing as expected and requires additional follow up with physician  [] Other    Prognosis for POC: [x] Good [] Fair  [] Poor    Patient requires continued skilled intervention: [x] Yes  [] No        PLAN: See eval  [] Continue per plan of care [] Alter current plan (see comments)  [x] Plan of care initiated [] Hold pending MD visit [] Discharge    Electronically signed by: [place] PT    Note: If patient does not return for scheduled/recommended follow up visits, this note will serve as a discharge from care along with the most recent update on progress.

## 2021-05-06 ENCOUNTER — HOSPITAL ENCOUNTER (OUTPATIENT)
Dept: PHYSICAL THERAPY | Age: 66
Setting detail: THERAPIES SERIES
Discharge: HOME | End: 2021-05-06
Payer: MEDICARE

## 2021-05-06 PROCEDURE — 97110 THERAPEUTIC EXERCISES: CPT

## 2021-05-06 PROCEDURE — 97140 MANUAL THERAPY 1/> REGIONS: CPT

## 2021-05-06 NOTE — FLOWSHEET NOTE
navigation   [] (39135)Reviewed/Progressed HEP activities related to improving balance, coordination, kinesthetic sense, posture, motor skill, proprioception of core, proximal hip and LE for self care, mobility, lifting, and ambulation/stair navigation      Manual Treatments:  PROM / STM / Oscillations-Mobs:  G-I, II, III, IV (PA's, Inf., Post.)  [x] (75874) Provided manual therapy to mobilize LE, proximal hip and/or LS spine soft tissue/joints for the purpose of modulating pain, promoting relaxation,  increasing ROM, reducing/eliminating soft tissue swelling/inflammation/restriction, improving soft tissue extensibility and allowing for proper ROM for normal function with self care, mobility, lifting and ambulation. Modalities:     [] GAME READY (VASO)- for significant edema, swelling, pain control. Charges:  Timed Code Treatment Minutes: 45   Total Treatment Minutes: 45      [] EVAL (LOW) 36988 (typically 20 minutes face-to-face)  [] EVAL (MOD) 65499 (typically 30 minutes face-to-face)  [] EVAL (HIGH) 71134 (typically 45 minutes face-to-face)  [] RE-EVAL     [x] JG(26818) x  2   [] DRY NEEDLE 1 OR 2 MUSCLES  [] NMR (26194) x     [] DRY NEEDLE 3+ MUSCLES  [x] Manual (53668) x 1      [] TA (18223) x     [] Mech Traction (51868)  [] ES(attended) (46032)     [] ES (un) (04476):   [] VASO (62555)  [] Other:    If [facility] Please Indicate Time In/Out  CPT Code Time in Time out                                   GOALS:  Patient stated goal: reduce knee pain   [] Progressing: [] Met: [] Not Met: [] Adjusted    Therapist goals for Patient:   Short Term Goals: To be achieved in: 2 weeks  1. Independent in HEP and progression per patient tolerance, in order to prevent re-injury. [] Progressing: [] Met: [] Not Met: [] Adjusted  2. Patient will have a decrease in pain to facilitate improvement in movement, function, and ADLs as indicated by Functional Deficits.   [] Progressing: [] Met: [] Not Met: [] Adjusted    Long Term Goals: To be achieved in: 6 weeks  1. Disability index score of 25% or less for the LEFS to assist with reaching prior level of function. [] Progressing: [] Met: [] Not Met: [] Adjusted  2. Patient will demonstrate an increase in Strength to good proximal hip strength and control, within 5lb HHD in LE to allow for proper functional mobility as indicated by patients Functional Deficits. [] Progressing: [] Met: [] Not Met: [] Adjusted  3. Patient will return to standing and sitting functional activities without increased symptoms or restriction. [] Progressing: [] Met: [] Not Met: [] Adjusted  4. Patient will report being able to perform stairs without increased symptoms or restriction. (patient specific functional goal)    [] Progressing: [] Met: [] Not Met: [] Adjusted    ASSESSMENT:  Patient with good tolerance to all ROM and strengthening today. Less discomfort in anterior knee post IASTM and mobilization. No discomfort noted with slide lunge. Held off on BOSU squat today. Patient reporting knee feeling good at conclusion. Return to Play: (if applicable)   []  Stage 1: Intro to Strength   []  Stage 2: Return to Run and Strength   []  Stage 3: Return to Jump and Strength   []  Stage 4: Dynamic Strength and Agility   []  Stage 5: Sport Specific Training     []  Ready to Return to Play, Meets All Above Stages   []  Not Ready for Return to Sports   Comments:            Treatment/Activity Tolerance:  [x] Patient tolerated treatment well [] Patient limited by fatique  [] Patient limited by pain  [] Patient limited by other medical complications  [] Other:     Overall Progression Towards Functional goals/ Treatment Progress Update:  [] Patient is progressing as expected towards functional goals listed. [] Progression is slowed due to complexities/Impairments listed. [] Progression has been slowed due to co-morbidities.   [x] Plan just implemented, too soon to assess goals progression <30days   [] Goals require adjustment due to lack of progress  [] Patient is not progressing as expected and requires additional follow up with physician  [] Other    Prognosis for POC: [x] Good [] Fair  [] Poor    Patient requires continued skilled intervention: [x] Yes  [] No        PLAN: See eval  [] Continue per plan of care [] Alter current plan (see comments)  [x] Plan of care initiated [] Hold pending MD visit [] Discharge    Electronically signed by: Kelsey South PT    Note: If patient does not return for scheduled/recommended follow up visits, this note will serve as a discharge from care along with the most recent update on progress.

## 2021-05-12 ENCOUNTER — HOSPITAL ENCOUNTER (OUTPATIENT)
Dept: PHYSICAL THERAPY | Age: 66
Setting detail: THERAPIES SERIES
Discharge: HOME | End: 2021-05-12
Payer: MEDICARE

## 2021-05-12 PROCEDURE — 97110 THERAPEUTIC EXERCISES: CPT

## 2021-05-12 PROCEDURE — 97140 MANUAL THERAPY 1/> REGIONS: CPT

## 2021-05-12 NOTE — FLOWSHEET NOTE
[facility] [address] [facility] [address]  Phone: [phone] Fax: [phone]    Physical Therapy Treatment Note/ Progress Report:     Date:  2021    Patient Name:  Elisa Leyva    :  1955  MRN: [id number]  Restrictions/Precautions:    Medical/Treatment Diagnosis Information:  Diagnosis: medial meniscus tear, L knee pain  Treatment Diagnosis: L knee pain D74.901  Insurance/Certification information:  PT Insurance Information: [facility]  Physician Information:  Referring Practitioner: Dr Barrett Marshall of care signed (Y/N):     Date of Patient follow up with Physician:      Progress Report: [x]  Yes  []  No     Date Range for reporting period:  Beginnin2021  Ending:  -    Progress report due (10 Rx/or 30 days whichever is less):      Recertification due (POC duration/ or 90 days whichever is less): 6/15/2021     Visit # Insurance Allowable Auth Needed   4 [facility] (med guidelines) []Yes   [x]No     Latex Allergy:  [x]NO      []YES  Preferred Language for Healthcare:   [x]English       []other:  Functional Scale: LEFS 37.5% disability Date assessed:2021    Pain level:  210     SUBJECTIVE:  Patient reports that knee felt good after last session. Has had a few bad days due to the weather changes and being cold. Felt more of a twinge in the lower inside part of the knee. Hasn't felt like the brace is helping the knee, so hasn't been wearing itl.         OBJECTIVE: See eval   Observation:    Test measurements:      RESTRICTIONS/PRECAUTIONS: none    Exercises/Interventions:     Therapeutic Ex (44784)   Min: 30 Sets/sec Reps Notes/CUES   Retro Stepper/BIKE 1 5    BOSU lunge 10 10 Cues form   SC                             Hip Ext /table      BOSU fwd/side lunge      BOSU squat held     Leg Press Iso/Con/Ecc 0- 2 10 50 lb, 2 to 1   Cybex HS curl      TKE      Glute side walks 2 2 maroon   RDL      Slide Lunge 5 20    Slide HS eccentrics      Step ups/ecc step down 0  6 in   Swissball wall rolls- in SLS- hip drive      Quad hip ext/wall-ball rolls                  Manual Intervention (70562)  Min: 15 min      Knee mobs/PROM 1 5    Tib/Fem Mobs      Patella Mobs 1 5    Ankle mobs      IASTM 1 5          NMR re-education (06060)  Min:   CUES NEEDED   [place]ian/Biofeedback 10/10      BFR      G. Med activation      Hip Ext full ROM/ G. Activation      Bosu Bal and Prop- G Med      Single leg stance/Balance/Prop      Bosu Retro G. Med act      Prone Hip froggers- sliders/elevated            Therapeutic Activity (29266)  Min:      Ladders      Plyos      Dynamic Balance                            Therapeutic Exercise and NMR EXR  [x] (64684) Provided verbal/tactile cueing for activities related to strengthening, flexibility, endurance, ROM for improvements in LE, proximal hip, and core control with self care, mobility, lifting, ambulation. [x] (83345) Provided verbal/tactile cueing for activities related to improving balance, coordination, kinesthetic sense, posture, motor skill, proprioception  to assist with LE, proximal hip, and core control in self care, mobility, lifting, ambulation and eccentric single leg control.      NMR and Therapeutic Activities:    [x] (26185 or 04953) Provided verbal/tactile cueing for activities related to improving balance, coordination, kinesthetic sense, posture, motor skill, proprioception and motor activation to allow for proper function of core, proximal hip and LE with self care and ADLs and functional mobility.   [] (48933) Gait Re-education- Provided training and instruction to the patient for proper LE, core and proximal hip recruitment and positioning and eccentric body weight control with ambulation re-education including up and down stairs     Home Exercise Program:    [x] (78432) Reviewed/Progressed HEP activities related to strengthening, flexibility, endurance, ROM of core, proximal hip and LE for functional self-care, mobility, lifting and ambulation/stair navigation   [] (70169)Reviewed/Progressed HEP activities related to improving balance, coordination, kinesthetic sense, posture, motor skill, proprioception of core, proximal hip and LE for self care, mobility, lifting, and ambulation/stair navigation      Manual Treatments:  PROM / STM / Oscillations-Mobs:  G-I, II, III, IV (PA's, Inf., Post.)  [x] (58328) Provided manual therapy to mobilize LE, proximal hip and/or LS spine soft tissue/joints for the purpose of modulating pain, promoting relaxation,  increasing ROM, reducing/eliminating soft tissue swelling/inflammation/restriction, improving soft tissue extensibility and allowing for proper ROM for normal function with self care, mobility, lifting and ambulation. Modalities:     [] GAME READY (VASO)- for significant edema, swelling, pain control. Charges:  Timed Code Treatment Minutes: 45   Total Treatment Minutes: 45      [] EVAL (LOW) 82458 (typically 20 minutes face-to-face)  [] EVAL (MOD) 44959 (typically 30 minutes face-to-face)  [] EVAL (HIGH) 39697 (typically 45 minutes face-to-face)  [] RE-EVAL     [x] YB(90521) x  2   [] DRY NEEDLE 1 OR 2 MUSCLES  [] NMR (07003) x     [] DRY NEEDLE 3+ MUSCLES  [x] Manual (78727) x 1      [] TA (12897) x     [] Mech Traction (36943)  [] ES(attended) (19752)     [] ES (un) (33013):   [] VASO (55144)  [] Other:    If [facility] Please Indicate Time In/Out  CPT Code Time in Time out                                   GOALS:  Patient stated goal: reduce knee pain   [] Progressing: [] Met: [] Not Met: [] Adjusted    Therapist goals for Patient:   Short Term Goals: To be achieved in: 2 weeks  1. Independent in HEP and progression per patient tolerance, in order to prevent re-injury. [] Progressing: [] Met: [] Not Met: [] Adjusted  2.  Patient will have a decrease in pain to facilitate improvement in movement, function, and ADLs as indicated by complexities/Impairments listed. [] Progression has been slowed due to co-morbidities. [x] Plan just implemented, too soon to assess goals progression <30days   [] Goals require adjustment due to lack of progress  [] Patient is not progressing as expected and requires additional follow up with physician  [] Other    Prognosis for POC: [x] Good [] Fair  [] Poor    Patient requires continued skilled intervention: [x] Yes  [] No        PLAN: See eval  [] Continue per plan of care [] Alter current plan (see comments)  [x] Plan of care initiated [] Hold pending MD visit [] Discharge    Electronically signed by: Avelino Heard PT    Note: If patient does not return for scheduled/recommended follow up visits, this note will serve as a discharge from care along with the most recent update on progress.

## 2021-05-18 ENCOUNTER — HOSPITAL ENCOUNTER (OUTPATIENT)
Dept: PHYSICAL THERAPY | Age: 66
Setting detail: THERAPIES SERIES
Discharge: HOME | End: 2021-05-18
Payer: MEDICARE

## 2021-05-18 PROCEDURE — 97140 MANUAL THERAPY 1/> REGIONS: CPT

## 2021-05-18 PROCEDURE — 97110 THERAPEUTIC EXERCISES: CPT

## 2021-05-18 NOTE — FLOWSHEET NOTE
[place] [address] [facility] [address] [address]  Phone: [phone] Fax: [phone]    Physical Therapy Treatment Note/ Progress Report:     Date:  2021    Patient Name:  Rafael Isidro    :  1955  MRN: [id number]  Restrictions/Precautions:    Medical/Treatment Diagnosis Information:  Diagnosis: medial meniscus tear, L knee pain  Treatment Diagnosis: L knee pain G32.280  Insurance/Certification information:  PT Insurance Information: [facility]  Physician Information:  Referring Practitioner: Dr Butler Core of care signed (Y/N):     Date of Patient follow up with Physician:      Progress Report: [x]  Yes  []  No     Date Range for reporting period:  Beginnin2021  Ending:  -    Progress report due (10 Rx/or 30 days whichever is less):      Recertification due (POC duration/ or 90 days whichever is less): 6/15/2021     Visit # Insurance Allowable Auth Needed   5 Medicare/[facility] (med guidelines) []Yes   [x]No     Latex Allergy:  [x]NO      []YES  Preferred Language for Healthcare:   [x]English       []other:  Functional Scale: LEFS 37.5% disability Date assessed:2021    Pain level:  4/10     SUBJECTIVE:  Patient reports that knee felt good after last session, but didn't last for a very long time. Feeling more stiff into inside of knee. Planning for injection  week of .         OBJECTIVE: See eval   Observation:    Test measurements:      RESTRICTIONS/PRECAUTIONS: none    Exercises/Interventions:     Therapeutic Ex (79332)   Min: 25 Sets/sec Reps Notes/CUES   Retro Stepper/BIKE 1 5    BOSU lunge 10 10 Cues form   SC  20                            Hip Ext /table      BOSU fwd/side lunge      BOSU squat held     Leg Press Iso/Con/Ecc 0- 2 10 50 lb, 2 to 1   Cybex HS curl      TKE      Glute side walks 2 2 maroon   RDL      Slide Lunge 5 20    Slide HS eccentrics      Step ups/ecc step down 0  6 in   Yahoo wall rolls- in SLS- hip drive      Quad hip ext/wall-ball rolls                  Manual Intervention (97441)  Min: 20 min      Knee mobs/PROM 1 3    Tib/Fem Mobs      Patella Mobs 1 2    Ankle mobs      IASTM 1 15          NMR re-education (08937)  Min:   CUES NEEDED   Irish/Biofeedback 10/10      BFR      G. Med activation      Hip Ext full ROM/ G. Activation      Bosu Bal and Prop- G Med      Single leg stance/Balance/Prop      Bosu Retro G. Med act      Prone Hip froggers- sliders/elevated            Therapeutic Activity (98678)  Min:      Ladders      Plyos      Dynamic Balance                            Therapeutic Exercise and NMR EXR  [x] (98348) Provided verbal/tactile cueing for activities related to strengthening, flexibility, endurance, ROM for improvements in LE, proximal hip, and core control with self care, mobility, lifting, ambulation. [x] (93731) Provided verbal/tactile cueing for activities related to improving balance, coordination, kinesthetic sense, posture, motor skill, proprioception  to assist with LE, proximal hip, and core control in self care, mobility, lifting, ambulation and eccentric single leg control.      NMR and Therapeutic Activities:    [x] (36590 or 94942) Provided verbal/tactile cueing for activities related to improving balance, coordination, kinesthetic sense, posture, motor skill, proprioception and motor activation to allow for proper function of core, proximal hip and LE with self care and ADLs and functional mobility.   [] (03769) Gait Re-education- Provided training and instruction to the patient for proper LE, core and proximal hip recruitment and positioning and eccentric body weight control with ambulation re-education including up and down stairs     Home Exercise Program:    [x] (46230) Reviewed/Progressed HEP activities related to strengthening, flexibility, endurance, ROM of core, proximal hip and LE for functional self-care, mobility, lifting and ambulation/stair navigation   [] (02133)Reviewed/Progressed HEP activities related to improving balance, coordination, kinesthetic sense, posture, motor skill, proprioception of core, proximal hip and LE for self care, mobility, lifting, and ambulation/stair navigation      Manual Treatments:  PROM / STM / Oscillations-Mobs:  G-I, II, III, IV (PA's, Inf., Post.)  [x] (24923) Provided manual therapy to mobilize LE, proximal hip and/or LS spine soft tissue/joints for the purpose of modulating pain, promoting relaxation,  increasing ROM, reducing/eliminating soft tissue swelling/inflammation/restriction, improving soft tissue extensibility and allowing for proper ROM for normal function with self care, mobility, lifting and ambulation. Modalities:     [] GAME READY (VASO)- for significant edema, swelling, pain control. Charges:  Timed Code Treatment Minutes: 45   Total Treatment Minutes: 45      [] EVAL (LOW) 72659 (typically 20 minutes face-to-face)  [] EVAL (MOD) 28736 (typically 30 minutes face-to-face)  [] EVAL (HIGH) 95872 (typically 45 minutes face-to-face)  [] RE-EVAL     [x] MB(03346) x  2   [] DRY NEEDLE 1 OR 2 MUSCLES  [] NMR (76079) x     [] DRY NEEDLE 3+ MUSCLES  [x] Manual (52915) x 1      [] TA (81715) x     [] Mech Traction (24434)  [] ES(attended) (80605)     [] ES (un) (06105):   [] VASO (43294)  [] Other:    If [facility] Please Indicate Time In/Out  CPT Code Time in Time out                                   GOALS:  Patient stated goal: reduce knee pain   [] Progressing: [] Met: [] Not Met: [] Adjusted    Therapist goals for Patient:   Short Term Goals: To be achieved in: 2 weeks  1. Independent in HEP and progression per patient tolerance, in order to prevent re-injury. [] Progressing: [] Met: [] Not Met: [] Adjusted  2. Patient will have a decrease in pain to facilitate improvement in movement, function, and ADLs as indicated by Functional Deficits.   [] Progressing: [] Met: [] Not Met: []

## 2021-05-25 ENCOUNTER — HOSPITAL ENCOUNTER (OUTPATIENT)
Dept: PHYSICAL THERAPY | Age: 66
Setting detail: THERAPIES SERIES
Discharge: HOME | End: 2021-05-25
Payer: MEDICARE

## 2021-05-25 PROCEDURE — 97140 MANUAL THERAPY 1/> REGIONS: CPT

## 2021-05-25 PROCEDURE — 97110 THERAPEUTIC EXERCISES: CPT

## 2021-05-25 PROCEDURE — 97032 APPL MODALITY 1+ESTIM EA 15: CPT

## 2021-05-25 PROCEDURE — 20560 NDL INSJ W/O NJX 1 OR 2 MUSC: CPT

## 2021-05-25 NOTE — FLOWSHEET NOTE
Lunge 5     Slide HS eccentrics      Step ups/ecc step down 0  6 in   Swissball wall rolls- in SLS- hip drive      Quad hip ext/wall-ball rolls                  Manual Intervention (23616)  Min: 15 min      Knee mobs/PROM 1 3    Tib/Fem Mobs      Patella Mobs 1 2    Ankle mobs      IASTM 1 10          NMR re-education (71739)  Min:   CUES NEEDED   Albanian/Biofeedback 10/10      BFR      G. Med activation      Hip Ext full ROM/ G. Activation      Bosu Bal and Prop- G Med      Single leg stance/Balance/Prop      Bosu Retro G. Med act      Prone Hip froggers- sliders/elevated            Therapeutic Activity (32753)  Min:      Ladders      Plyos      Dynamic Balance            DN: 5 min 1 5 min    ESTIM: 10  1 10 min      Spoke with   regarding the use of Dry Needling     Dry needling manual therapy: consisted on the placement of 4 needles in the following muscles:  medial patella, medial knee. A 30-50 mm needle was inserted, piston, rotated, and coned to produce intramuscular mobilization. These techniques were used to restore functional range of motion, reduce muscle spasm and induce healing in the corresponding musculature. (79094)  Clean Technique was utilized today while applying Dry needling treatment. The treatment sites where cleaned with 70% solution of  isopropyl alcohol . The PT washed their hands and utilized treatment gloves along with hand  prior to inserting the needles. All needles where removed and discarded in the appropriate sharps container. MD has given verbal and/or written approval for this treatment. Attended low frequency (1-20Hz) electrical stimulation was utilized in conjunction with Dry Needling:  the Estim was manipulated between all above needles for a period of 10 min. at 5-6 volts. The low frequency electrical stimulation was used to help reduce muscle spasm and help to interrupt /Morton the pain cycle.  (98324)         Therapeutic Exercise and NMR EXR  [x] (09651) Provided verbal/tactile cueing for activities related to strengthening, flexibility, endurance, ROM for improvements in LE, proximal hip, and core control with self care, mobility, lifting, ambulation. [x] (94819) Provided verbal/tactile cueing for activities related to improving balance, coordination, kinesthetic sense, posture, motor skill, proprioception  to assist with LE, proximal hip, and core control in self care, mobility, lifting, ambulation and eccentric single leg control.      NMR and Therapeutic Activities:    [x] (92223 or 68014) Provided verbal/tactile cueing for activities related to improving balance, coordination, kinesthetic sense, posture, motor skill, proprioception and motor activation to allow for proper function of core, proximal hip and LE with self care and ADLs and functional mobility.   [] (45314) Gait Re-education- Provided training and instruction to the patient for proper LE, core and proximal hip recruitment and positioning and eccentric body weight control with ambulation re-education including up and down stairs     Home Exercise Program:    [x] (89339) Reviewed/Progressed HEP activities related to strengthening, flexibility, endurance, ROM of core, proximal hip and LE for functional self-care, mobility, lifting and ambulation/stair navigation   [] (59076)Reviewed/Progressed HEP activities related to improving balance, coordination, kinesthetic sense, posture, motor skill, proprioception of core, proximal hip and LE for self care, mobility, lifting, and ambulation/stair navigation      Manual Treatments:  PROM / STM / Oscillations-Mobs:  G-I, II, III, IV (PA's, Inf., Post.)  [x] (04490) Provided manual therapy to mobilize LE, proximal hip and/or LS spine soft tissue/joints for the purpose of modulating pain, promoting relaxation,  increasing ROM, reducing/eliminating soft tissue swelling/inflammation/restriction, improving soft tissue extensibility and allowing for proper ROM for normal function with self care, mobility, lifting and ambulation. Modalities:     [] GAME READY (VASO)- for significant edema, swelling, pain control. Charges:  Timed Code Treatment Minutes: 25   Total Treatment Minutes: 40      [] EVAL (LOW) 60260 (typically 20 minutes face-to-face)  [] EVAL (MOD) 54079 (typically 30 minutes face-to-face)  [] EVAL (HIGH) 21240 (typically 45 minutes face-to-face)  [] RE-EVAL     [x] AE(04619) x  1  [x] DRY NEEDLE 1 OR 2 MUSCLES  [] NMR (50919) x     [] DRY NEEDLE 3+ MUSCLES  [x] Manual (25360) x 1      [] TA (99936) x     [] Mech Traction (96322)  [x] ES(attended) (16097)     [] ES (un) (49251):   [] VASO (34573)  [] Other:    If [facility] Please Indicate Time In/Out  CPT Code Time in Time out                                   GOALS:  Patient stated goal: reduce knee pain   [] Progressing: [] Met: [] Not Met: [] Adjusted    Therapist goals for Patient:   Short Term Goals: To be achieved in: 2 weeks  1. Independent in HEP and progression per patient tolerance, in order to prevent re-injury. [] Progressing: [] Met: [] Not Met: [] Adjusted  2. Patient will have a decrease in pain to facilitate improvement in movement, function, and ADLs as indicated by Functional Deficits. [] Progressing: [] Met: [] Not Met: [] Adjusted    Long Term Goals: To be achieved in: 6 weeks  1. Disability index score of 25% or less for the LEFS to assist with reaching prior level of function. [] Progressing: [] Met: [] Not Met: [] Adjusted  2. Patient will demonstrate an increase in Strength to good proximal hip strength and control, within 5lb HHD in LE to allow for proper functional mobility as indicated by patients Functional Deficits. [] Progressing: [] Met: [] Not Met: [] Adjusted  3. Patient will return to standing and sitting functional activities without increased symptoms or restriction. [] Progressing: [] Met: [] Not Met: [] Adjusted  4.  Patient will report being able to perform stairs without increased symptoms or restriction. (patient specific functional goal)    [] Progressing: [] Met: [] Not Met: [] Adjusted    ASSESSMENT:  Patient with no pain after DN. Good tolerance to IASTM. No medial soft tissue pain. Patient knee ROM is full and no limitation with mobilization into flexion or extension. Patient tolerated all strengthening well. Less need for cues for gluteal activation with improved balance after DN. Return to Play: (if applicable)   []  Stage 1: Intro to Strength   []  Stage 2: Return to Run and Strength   []  Stage 3: Return to Jump and Strength   []  Stage 4: Dynamic Strength and Agility   []  Stage 5: Sport Specific Training     []  Ready to Return to Play, Meets All Above Stages   []  Not Ready for Return to Sports   Comments:            Treatment/Activity Tolerance:  [x] Patient tolerated treatment well [] Patient limited by fatique  [] Patient limited by pain  [] Patient limited by other medical complications  [] Other:     Overall Progression Towards Functional goals/ Treatment Progress Update:  [] Patient is progressing as expected towards functional goals listed. [] Progression is slowed due to complexities/Impairments listed. [] Progression has been slowed due to co-morbidities.   [x] Plan just implemented, too soon to assess goals progression <30days   [] Goals require adjustment due to lack of progress  [] Patient is not progressing as expected and requires additional follow up with physician  [] Other    Prognosis for POC: [x] Good [] Fair  [] Poor    Patient requires continued skilled intervention: [x] Yes  [] No        PLAN: See eval  [] Continue per plan of care [] Alter current plan (see comments)  [x] Plan of care initiated [] Hold pending MD visit [] Discharge    Electronically signed by: Jass Ross PT    Note: If patient does not return for scheduled/recommended follow up visits, this note will serve as a discharge from care along with the most recent update on progress.

## 2021-06-01 ENCOUNTER — HOSPITAL ENCOUNTER (OUTPATIENT)
Dept: PHYSICAL THERAPY | Age: 66
Setting detail: THERAPIES SERIES
Discharge: HOME | End: 2021-06-01
Payer: MEDICARE

## 2021-06-01 PROCEDURE — 97110 THERAPEUTIC EXERCISES: CPT

## 2021-06-01 PROCEDURE — 97140 MANUAL THERAPY 1/> REGIONS: CPT

## 2021-06-01 NOTE — PLAN OF CARE
[place] [address] [address]  Phone: [phone] Fax: [phone]        Physical Therapy Re-Certification Plan of Care/MD UPDATE      Dear Referring Practitioner: Dr Leigh Quintana,    We had the pleasure of treating the following patient for physical therapy services at [address] [place] [address]. A summary of our findings can be found in the updated assessment below. This includes our plan of care. If you have any questions or concerns regarding these findings, please do not hesitate to contact me at the office phone number checked above.   Thank you for the referral.     Physician Signature:________________________________Date:__________________  By signing above (or electronic signature), therapists plan is approved by physician    Date Range Of Visits: 2021 thru 2021  Total Visits to Date: 7  Overall Response to Treatment:   [x]Patient is responding well to treatment and improvement is noted with regards  to goals   []Patient should continue to improve in reasonable time if they continue HEP   []Patient has plateaued and is no longer responding to skilled PT intervention    []Patient is getting worse and would benefit from return to referring MD   []Patient unable to adhere to initial POC   []Other:       Physical Therapy Treatment Note/ Progress Report:     Date:  2021    Patient Name:  Sarah Glez    :  1955  MRN: [id number]  Restrictions/Precautions:    Medical/Treatment Diagnosis Information:  Diagnosis: medial meniscus tear, L knee pain  Treatment Diagnosis: L knee pain N14.716  Insurance/Certification information:  PT Insurance Information: [facility]  Physician Information:  Referring Practitioner: Dr Jessica Perez of care signed (Y/N):     Date of Patient follow up with Physician:      Progress Report: [x]  Yes  []  No     Date Range for reporting period:  Beginnin2021  Ending: 6/1/2021    Progress report due (10 Rx/or 30 days whichever is less): 2/62/60     Recertification due (POC duration/ or 90 days whichever is less): 6/15/2021     Visit # Insurance Allowable Auth Needed   7 Medicare/[facility] (med guidelines) []Yes   [x]No     Latex Allergy:  [x]NO      []YES  Preferred Language for Healthcare:   [x]English       []other:  Functional Scale: LEFS 22.5% disability Date assessed:6/1/2021    Pain level:  2-3/10     SUBJECTIVE:  Patient reports that he was exhausted for about 8 hours after DN. Doesn't feel as much of silviano feel in knee; but still has some discomfort. Doesn't feel DN was super helpful and would like to just do hands on instead of DN today. All joints feeling a bit achy from weather changes last week. Overall patient feeling balance has improved significantly, and having no real issues with his back . OBJECTIVE:    Observation:    Test measurements:      RESTRICTIONS/PRECAUTIONS: none    Exercises/Interventions:     Therapeutic Ex (25571)   Min: 25 Sets/sec Reps Notes/CUES   Retro Stepper/BIKE 1 5    BOSU lunge 10 10 Cues form   SC march 2 20                            Hip Ext /table      BOSU fwd/side lunge      BOSU squat held     Leg Press Iso/Con/Ecc 0- 2 10 50 lb, 2 to 1   Cybex HS curl      TKE      Glute side walks 2 2 maroon   RDL      Slide Lunge 5 10    Slide HS eccentrics      Step ups/ecc step down 0  6 in   Swissball wall rolls- in SLS- hip drive      Quad hip ext/wall-ball rolls                  Manual Intervention (83759)  Min: 20 min      Knee mobs/PROM 1 8    Tib/Fem Mobs      Patella Mobs 1 2    Ankle mobs      IASTM 1 10          NMR re-education (69114)  Min:   CUES NEEDED   Libyan/Biofeedback 10/10      BFR      G. Med activation      Hip Ext full ROM/ G.  Activation      Bosu Bal and Prop- G Med      Single leg stance/Balance/Prop      Bosu Retro G. Med act      Prone Hip froggers- sliders/elevated            Therapeutic Activity (42607)  Min: Ladders      Plyos      Dynamic Balance                    Therapeutic Exercise and NMR EXR  [x] (18827) Provided verbal/tactile cueing for activities related to strengthening, flexibility, endurance, ROM for improvements in LE, proximal hip, and core control with self care, mobility, lifting, ambulation. [x] (21570) Provided verbal/tactile cueing for activities related to improving balance, coordination, kinesthetic sense, posture, motor skill, proprioception  to assist with LE, proximal hip, and core control in self care, mobility, lifting, ambulation and eccentric single leg control.      NMR and Therapeutic Activities:    [x] (77156 or 22474) Provided verbal/tactile cueing for activities related to improving balance, coordination, kinesthetic sense, posture, motor skill, proprioception and motor activation to allow for proper function of core, proximal hip and LE with self care and ADLs and functional mobility.   [] (85317) Gait Re-education- Provided training and instruction to the patient for proper LE, core and proximal hip recruitment and positioning and eccentric body weight control with ambulation re-education including up and down stairs     Home Exercise Program:    [x] (96886) Reviewed/Progressed HEP activities related to strengthening, flexibility, endurance, ROM of core, proximal hip and LE for functional self-care, mobility, lifting and ambulation/stair navigation   [] (47471)Reviewed/Progressed HEP activities related to improving balance, coordination, kinesthetic sense, posture, motor skill, proprioception of core, proximal hip and LE for self care, mobility, lifting, and ambulation/stair navigation      Manual Treatments:  PROM / STM / Oscillations-Mobs:  G-I, II, III, IV (PA's, Inf., Post.)  [x] (41720) Provided manual therapy to mobilize LE, proximal hip and/or LS spine soft tissue/joints for the purpose of modulating pain, promoting relaxation,  increasing ROM, reducing/eliminating soft tissue swelling/inflammation/restriction, improving soft tissue extensibility and allowing for proper ROM for normal function with self care, mobility, lifting and ambulation. Modalities:     [] GAME READY (VASO)- for significant edema, swelling, pain control. Charges:  Timed Code Treatment Minutes: 45   Total Treatment Minutes: 45      [] EVAL (LOW) 50559 (typically 20 minutes face-to-face)  [] EVAL (MOD) 74230 (typically 30 minutes face-to-face)  [] EVAL (HIGH) 78463 (typically 45 minutes face-to-face)  [] RE-EVAL     [x] KF(00848) x  2  [] DRY NEEDLE 1 OR 2 MUSCLES  [] NMR (68974) x     [] DRY NEEDLE 3+ MUSCLES  [x] Manual (75721) x 1      [] TA (00678) x     [] Mech Traction (82249)  [] ES(attended) (58361)     [] ES (un) (69969):   [] VASO (53145)  [] Other:    If [facility] Please Indicate Time In/Out  CPT Code Time in Time out                                   GOALS:  Patient stated goal: reduce knee pain   [x] Progressing: [] Met: [] Not Met: [] Adjusted    Therapist goals for Patient:   Short Term Goals: To be achieved in: 2 weeks  1. Independent in HEP and progression per patient tolerance, in order to prevent re-injury. [] Progressing: [x] Met: [] Not Met: [] Adjusted  2. Patient will have a decrease in pain to facilitate improvement in movement, function, and ADLs as indicated by Functional Deficits. [x] Progressing: [] Met: [] Not Met: [] Adjusted    Long Term Goals: To be achieved in: 6 weeks  1. Disability index score of 25% or less for the LEFS to assist with reaching prior level of function. [] Progressing: [x] Met: [] Not Met: [] Adjusted  2. Patient will demonstrate an increase in Strength to good proximal hip strength and control, within 5lb HHD in LE to allow for proper functional mobility as indicated by patients Functional Deficits. [x] Progressing: [] Met: [] Not Met: [] Adjusted  3.  Patient will return to standing and sitting functional activities without increased symptoms or restriction. [] Progressing: [x] Met: [] Not Met: [] Adjusted  4. Patient will report being able to perform stairs without increased symptoms or restriction. (patient specific functional goal)    [x] Progressing: [] Met: [] Not Met: [] Adjusted    ASSESSMENT:  Patient has been seen for 7 visits of physical therapy to address L knee pain. Patient continues to improve in regards to strength and ROM. DN to medial knee with fair improvements. Patient still with primary reports of discomfort along medial knee and inferior patella. Patient needs cues for ipmroved gluteal activation during exercises. Patient will benefit from further PT services, will continue 1x week. Patient to have f/u with MD 6/4. Return to Play: (if applicable)   []  Stage 1: Intro to Strength   []  Stage 2: Return to Run and Strength   []  Stage 3: Return to Jump and Strength   []  Stage 4: Dynamic Strength and Agility   []  Stage 5: Sport Specific Training     []  Ready to Return to Play, Meets All Above Stages   []  Not Ready for Return to Sports   Comments:            Treatment/Activity Tolerance:  [x] Patient tolerated treatment well [] Patient limited by fatique  [] Patient limited by pain  [] Patient limited by other medical complications  [] Other:     Overall Progression Towards Functional goals/ Treatment Progress Update:  [x] Patient is progressing as expected towards functional goals listed. [] Progression is slowed due to complexities/Impairments listed. [] Progression has been slowed due to co-morbidities.   [] Plan just implemented, too soon to assess goals progression <30days   [] Goals require adjustment due to lack of progress  [] Patient is not progressing as expected and requires additional follow up with physician  [] Other    Prognosis for POC: [x] Good [] Fair  [] Poor    Patient requires continued skilled intervention: [x] Yes  [] No        PLAN: See libertad  [x] Continue per plan of care [] Alter current plan (see comments)  [] Plan of care initiated [] Hold pending MD visit [] Discharge    Electronically signed by: Lea Puente, PT    Note: If patient does not return for scheduled/recommended follow up visits, this note will serve as a discharge from care along with the most recent update on progress.

## 2021-06-09 ENCOUNTER — HOSPITAL ENCOUNTER (OUTPATIENT)
Dept: PHYSICAL THERAPY | Age: 66
Setting detail: THERAPIES SERIES
Discharge: HOME | End: 2021-06-09
Payer: MEDICARE

## 2021-06-09 PROCEDURE — 97110 THERAPEUTIC EXERCISES: CPT

## 2021-06-09 PROCEDURE — 97140 MANUAL THERAPY 1/> REGIONS: CPT

## 2021-06-09 NOTE — FLOWSHEET NOTE
[facility] [address] [facility] [address]  Phone: [phone] Fax: [phone]       Physical Therapy Treatment Note/ Progress Report:     Date:  2021    Patient Name:  France Ybarra    :  1955  MRN: [id number]  Restrictions/Precautions:    Medical/Treatment Diagnosis Information:  Diagnosis: medial meniscus tear, L knee pain  Treatment Diagnosis: L knee pain T58.780  Insurance/Certification information:  PT Insurance Information: [facility]  Physician Information:  Referring Practitioner: Dr Mao Lacy of care signed (Y/N):     Date of Patient follow up with Physician:      Progress Report: [x]  Yes  []  No     Date Range for reporting period:  Beginnin2021  Endin2021    Progress report due (10 Rx/or 30 days whichever is less):      Recertification due (POC duration/ or 90 days whichever is less): 6/15/2021     Visit # Insurance Allowable Auth Needed   8 Medicare/[facility] (med guidelines) []Yes   [x]No     Latex Allergy:  [x]NO      []YES  Preferred Language for Healthcare:   [x]English       []other:  Functional Scale: LEFS 22.5% disability Date assessed:2021    Pain level:  2-3/10     SUBJECTIVE:  Patient reports that his knee felt ok after last session. Had follow up with MD and he put him on a dose pack last week- is on day 3. Was able to go down stairs reciprocally today. Still a bit sore along medial joint line. Doc feels he needs to get stronger.          OBJECTIVE:    Observation:    Test measurements:      RESTRICTIONS/PRECAUTIONS: none    Exercises/Interventions:     Therapeutic Ex (33686)   Min: 25 Sets/sec Reps Notes/CUES   Retro Stepper/BIKE 1 5    BOSU lunge 10 10 Cues form   SC     Wall sit- cues heels 5sec 20 80-90 degrees                     Hip Ext /table      BOSU fwd/side lunge      BOSU squat held     Leg Press Iso/Con/Ecc 0- 2 10 50 lb, 2 to 1   Cybex HS curl      TKE Home Exercise Program:    [x] (81721) Reviewed/Progressed HEP activities related to strengthening, flexibility, endurance, ROM of core, proximal hip and LE for functional self-care, mobility, lifting and ambulation/stair navigation   [] (92767)Reviewed/Progressed HEP activities related to improving balance, coordination, kinesthetic sense, posture, motor skill, proprioception of core, proximal hip and LE for self care, mobility, lifting, and ambulation/stair navigation      Manual Treatments:  PROM / STM / Oscillations-Mobs:  G-I, II, III, IV (PA's, Inf., Post.)  [x] (92422) Provided manual therapy to mobilize LE, proximal hip and/or LS spine soft tissue/joints for the purpose of modulating pain, promoting relaxation,  increasing ROM, reducing/eliminating soft tissue swelling/inflammation/restriction, improving soft tissue extensibility and allowing for proper ROM for normal function with self care, mobility, lifting and ambulation. Modalities:     [] GAME READY (VASO)- for significant edema, swelling, pain control. Charges:  Timed Code Treatment Minutes: 45   Total Treatment Minutes: 45      [] EVAL (LOW) 77315 (typically 20 minutes face-to-face)  [] EVAL (MOD) 09885 (typically 30 minutes face-to-face)  [] EVAL (HIGH) 32855 (typically 45 minutes face-to-face)  [] RE-EVAL     [x] AY(92293) x  2  [] DRY NEEDLE 1 OR 2 MUSCLES  [] NMR (66917) x     [] DRY NEEDLE 3+ MUSCLES  [x] Manual (36332) x 1      [] TA (23349) x     [] Mech Traction (54858)  [] ES(attended) (64091)     [] ES (un) (84902):   [] VASO (14062)  [] Other:    If [facility] Please Indicate Time In/Out  CPT Code Time in Time out                                   GOALS:  Patient stated goal: reduce knee pain   [x] Progressing: [] Met: [] Not Met: [] Adjusted    Therapist goals for Patient:   Short Term Goals: To be achieved in: 2 weeks  1. Independent in HEP and progression per patient tolerance, in order to prevent re-injury.    [] Progressing: [x] Met: [] Not Met: [] Adjusted  2. Patient will have a decrease in pain to facilitate improvement in movement, function, and ADLs as indicated by Functional Deficits. [x] Progressing: [] Met: [] Not Met: [] Adjusted    Long Term Goals: To be achieved in: 6 weeks  1. Disability index score of 25% or less for the LEFS to assist with reaching prior level of function. [] Progressing: [x] Met: [] Not Met: [] Adjusted  2. Patient will demonstrate an increase in Strength to good proximal hip strength and control, within 5lb HHD in LE to allow for proper functional mobility as indicated by patients Functional Deficits. [x] Progressing: [] Met: [] Not Met: [] Adjusted  3. Patient will return to standing and sitting functional activities without increased symptoms or restriction. [] Progressing: [x] Met: [] Not Met: [] Adjusted  4. Patient will report being able to perform stairs without increased symptoms or restriction. (patient specific functional goal)    [x] Progressing: [] Met: [] Not Met: [] Adjusted    ASSESSMENT:  Patient with less soft tissue restriction along medial joint line to palpate; but still noticeable with IASTM. Patient doing well with strengthening, but still lacking eccentric control with lowering on stairs. Patient needs cues for ipmroved gluteal activation during exercises. Patient will benefit from further PT services, will continue 1x week.       Return to Play: (if applicable)   []  Stage 1: Intro to Strength   []  Stage 2: Return to Run and Strength   []  Stage 3: Return to Jump and Strength   []  Stage 4: Dynamic Strength and Agility   []  Stage 5: Sport Specific Training     []  Ready to Return to Play, Meets All Above Stages   []  Not Ready for Return to Sports   Comments:            Treatment/Activity Tolerance:  [x] Patient tolerated treatment well [] Patient limited by fatique  [] Patient limited by pain  [] Patient limited by other medical complications  [] Other:     Overall

## 2021-06-16 ENCOUNTER — HOSPITAL ENCOUNTER (OUTPATIENT)
Dept: PHYSICAL THERAPY | Age: 66
Setting detail: THERAPIES SERIES
Discharge: HOME | End: 2021-06-16
Payer: MEDICARE

## 2021-06-16 PROCEDURE — 97140 MANUAL THERAPY 1/> REGIONS: CPT

## 2021-06-16 PROCEDURE — 97110 THERAPEUTIC EXERCISES: CPT

## 2021-06-16 NOTE — FLOWSHEET NOTE
[address]ad [address] [place] [address]  Phone: [phone] Fax: [phone]       Physical Therapy Treatment Note/ Progress Report:     Date:  2021    Patient Name:  Mariah Clark    :  1955  MRN: [id number]  Restrictions/Precautions:    Medical/Treatment Diagnosis Information:  Diagnosis: medial meniscus tear, L knee pain  Treatment Diagnosis: L knee pain R27.724  Insurance/Certification information:  PT Insurance Information: [facility]  Physician Information:  Referring Practitioner: Dr Narvaez Loss of care signed (Y/N):     Date of Patient follow up with Physician:       Progress Report: [x]  Yes  []  No     Date Range for reporting period:  Beginnin2021  Endin2021    Progress report due (10 Rx/or 30 days whichever is less):      Recertification due (POC duration/ or 90 days whichever is less): 6/15/2021     Visit # Insurance Allowable Auth Needed   9 Medicare/[facility] (med guidelines) []Yes   [x]No     Latex Allergy:  [x]NO      []YES  Preferred Language for Healthcare:   [x]English       []other:  Functional Scale: LEFS 22.5% disability Date assessed:2021    Pain level:  2-3/10     SUBJECTIVE:  Patient reports that his knee has been feeling okay, still has some pain at medial and lateral knee. States that he has a follow up with Dr. Bowen Chappell a week from Friday. Reports that steroids gave him and headache and migraine for several days but has recovered from that now.         OBJECTIVE:    Observation:    Test measurements:      RESTRICTIONS/PRECAUTIONS: none    Exercises/Interventions:     Therapeutic Ex (27616)   Min: 25 Sets/sec Reps Notes/CUES   Retro Stepper/BIKE 1 5    BOSU lunge 10 10 Cues form   SC  20    Wall sit- cues heels 5sec 20 80-90 degrees                     Hip Ext /table      BOSU fwd/side lunge      BOSU squat held     Leg Press Iso/Con/Ecc 0- 2 10 50 lb, 2 to 1   Cybex HS curl      TKE      Glute side walks 2 2 maroon   RDL      Slide Lunge 5 10    Slide HS eccentrics      Step ups/ecc step down 1 15 6 in- difficulty with eccentric control   Swissball wall rolls- in SLS- hip drive      Quad hip ext/wall-ball rolls                  Manual Intervention (75081)  Min: 20 min      Knee mobs/PROM 1 8 Rotational TF mobs, flexion mobs   Tib/Fem Mobs      Patella Mobs 1 2    Ankle mobs      IASTM 1 10 Medial joint line, patellar tendon         NMR re-education (58012)  Min:   CUES NEEDED   Iranian/Biofeedback 10/10      BFR      G. Med activation      Hip Ext full ROM/ G. Activation      Bosu Bal and Prop- G Med      Single leg stance/Balance/Prop      Bosu Retro G. Med act      Prone Hip froggers- sliders/elevated            Therapeutic Activity (39034)  Min:      Ladders      Plyos      Dynamic Balance                    Therapeutic Exercise and NMR EXR  [x] (29881) Provided verbal/tactile cueing for activities related to strengthening, flexibility, endurance, ROM for improvements in LE, proximal hip, and core control with self care, mobility, lifting, ambulation. [x] (52372) Provided verbal/tactile cueing for activities related to improving balance, coordination, kinesthetic sense, posture, motor skill, proprioception  to assist with LE, proximal hip, and core control in self care, mobility, lifting, ambulation and eccentric single leg control.      NMR and Therapeutic Activities:    [x] (19382 or 95140) Provided verbal/tactile cueing for activities related to improving balance, coordination, kinesthetic sense, posture, motor skill, proprioception and motor activation to allow for proper function of core, proximal hip and LE with self care and ADLs and functional mobility.   [] (62438) Gait Re-education- Provided training and instruction to the patient for proper LE, core and proximal hip recruitment and positioning and eccentric body weight control with ambulation re-education including up and down stairs     Home Exercise Program:    [x] (58728) Reviewed/Progressed HEP activities related to strengthening, flexibility, endurance, ROM of core, proximal hip and LE for functional self-care, mobility, lifting and ambulation/stair navigation   [] (27087)Reviewed/Progressed HEP activities related to improving balance, coordination, kinesthetic sense, posture, motor skill, proprioception of core, proximal hip and LE for self care, mobility, lifting, and ambulation/stair navigation      Manual Treatments:  PROM / STM / Oscillations-Mobs:  G-I, II, III, IV (PA's, Inf., Post.)  [x] (35788) Provided manual therapy to mobilize LE, proximal hip and/or LS spine soft tissue/joints for the purpose of modulating pain, promoting relaxation,  increasing ROM, reducing/eliminating soft tissue swelling/inflammation/restriction, improving soft tissue extensibility and allowing for proper ROM for normal function with self care, mobility, lifting and ambulation. Modalities:     [] GAME READY (VASO)- for significant edema, swelling, pain control. Charges:  Timed Code Treatment Minutes: 45   Total Treatment Minutes: 45      [] EVAL (LOW) 06404 (typically 20 minutes face-to-face)  [] EVAL (MOD) 20459 (typically 30 minutes face-to-face)  [] EVAL (HIGH) 33355 (typically 45 minutes face-to-face)  [] RE-EVAL     [x] PP(28767) x  2  [] DRY NEEDLE 1 OR 2 MUSCLES  [] NMR (67055) x     [] DRY NEEDLE 3+ MUSCLES  [x] Manual (43231) x 1      [] TA (03917) x     [] Mech Traction (23759)  [] ES(attended) (64933)     [] ES (un) (08492):   [] VASO (46559)  [] Other:    If [facility] Please Indicate Time In/Out  CPT Code Time in Time out                                   GOALS:  Patient stated goal: reduce knee pain   [x] Progressing: [] Met: [] Not Met: [] Adjusted    Therapist goals for Patient:   Short Term Goals: To be achieved in: 2 weeks  1. Independent in HEP and progression per patient tolerance, in order to prevent re-injury.    [] Progressing: [x] Met: [] Not Met: [] Adjusted  2. Patient will have a decrease in pain to facilitate improvement in movement, function, and ADLs as indicated by Functional Deficits. [x] Progressing: [] Met: [] Not Met: [] Adjusted    Long Term Goals: To be achieved in: 6 weeks  1. Disability index score of 25% or less for the LEFS to assist with reaching prior level of function. [] Progressing: [x] Met: [] Not Met: [] Adjusted  2. Patient will demonstrate an increase in Strength to good proximal hip strength and control, within 5lb HHD in LE to allow for proper functional mobility as indicated by patients Functional Deficits. [x] Progressing: [] Met: [] Not Met: [] Adjusted  3. Patient will return to standing and sitting functional activities without increased symptoms or restriction. [] Progressing: [x] Met: [] Not Met: [] Adjusted  4. Patient will report being able to perform stairs without increased symptoms or restriction. (patient specific functional goal)    [x] Progressing: [] Met: [] Not Met: [] Adjusted    ASSESSMENT: Good tolerance to treatment. Appropriately fatigued at conclusion. Decreased soreness with manual therapy. Return to Play: (if applicable)   []  Stage 1: Intro to Strength   []  Stage 2: Return to Run and Strength   []  Stage 3: Return to Jump and Strength   []  Stage 4: Dynamic Strength and Agility   []  Stage 5: Sport Specific Training     []  Ready to Return to Play, Meets All Above Stages   []  Not Ready for Return to Sports   Comments:            Treatment/Activity Tolerance:  [x] Patient tolerated treatment well [] Patient limited by fatique  [] Patient limited by pain  [] Patient limited by other medical complications  [] Other:     Overall Progression Towards Functional goals/ Treatment Progress Update:  [x] Patient is progressing as expected towards functional goals listed. [] Progression is slowed due to complexities/Impairments listed.   [] Progression has been slowed due to co-morbidities. [] Plan just implemented, too soon to assess goals progression <30days   [] Goals require adjustment due to lack of progress  [] Patient is not progressing as expected and requires additional follow up with physician  [] Other    Prognosis for POC: [x] Good [] Fair  [] Poor    Patient requires continued skilled intervention: [x] Yes  [] No        PLAN: See eval  [x] Continue per plan of care [] Alter current plan (see comments)  [] Plan of care initiated [] Hold pending MD visit [] Discharge    Electronically signed by: Leda Rebolledo PTA    Note: If patient does not return for scheduled/recommended follow up visits, this note will serve as a discharge from care along with the most recent update on progress.

## 2021-06-23 ENCOUNTER — HOSPITAL ENCOUNTER (OUTPATIENT)
Dept: PHYSICAL THERAPY | Age: 66
Setting detail: THERAPIES SERIES
Discharge: HOME | End: 2021-06-23
Payer: MEDICARE

## 2021-06-23 ENCOUNTER — APPOINTMENT (OUTPATIENT)
Dept: PHYSICAL THERAPY | Age: 66
End: 2021-06-23
Payer: MEDICARE

## 2021-06-23 PROCEDURE — 97110 THERAPEUTIC EXERCISES: CPT

## 2021-06-23 PROCEDURE — 97140 MANUAL THERAPY 1/> REGIONS: CPT

## 2021-06-23 NOTE — FLOWSHEET NOTE
[facility] [address] [facility]  Phone: [phone] Fax: [phone]       Physical Therapy Treatment Note/ Progress Report:     Date:  2021    Patient Name:  Isabella Gorman    :  1955  MRN: [id number]  Restrictions/Precautions:    Medical/Treatment Diagnosis Information:  Diagnosis: medial meniscus tear, L knee pain  Treatment Diagnosis: L knee pain B73.670  Insurance/Certification information:  PT Insurance Information: [facility]  Physician Information:  Referring Practitioner: Dr Aramis Valdivia of care signed (Y/N):     Date of Patient follow up with Physician:       Progress Report: [x]  Yes  []  No     Date Range for reporting period:  Beginnin2021  Endin2021    Progress report due (10 Rx/or 30 days whichever is less):      Recertification due (POC duration/ or 90 days whichever is less): 6/15/2021     Visit # Insurance Allowable Auth Needed   10 Medicare/[facility] (med guidelines) []Yes   [x]No     Latex Allergy:  [x]NO      []YES  Preferred Language for Healthcare:   [x]English       []other:  Functional Scale: LEFS 22.5% disability Date assessed:2021    Pain level:  2-3/10     SUBJECTIVE:  Patient reports that his knee has been really sore over the past week. Had a day or so in which he had shooting pain along knee for periods of time. At times was with walking and sometimes at rest. Will be following up with Aleksander Khan on Friday.          OBJECTIVE:    Observation:    Test measurements:      RESTRICTIONS/PRECAUTIONS: none    Exercises/Interventions:     Therapeutic Ex (97569)   Min: 25 Sets/sec Reps Notes/CUES   Retro Stepper/BIKE 1 5    BOSU lunge 10 10 Cues form   SC     Wall sit- cues heels 5sec 20 80-90 degrees                     Hip Ext /table      BOSU fwd/side lunge      BOSU squat held     Leg Press Iso/Con/Ecc 0- 2 10 50 lb, 2 to 1   Cybex HS curl      TKE      Glute side walks 2 2 maroon   RDL      Slide Lunge 5 10    Slide HS eccentrics      Step ups/ecc step down 1 15 6 in- difficulty with eccentric control   Swissball wall rolls- in SLS- hip drive      Quad hip ext/wall-ball rolls                  Manual Intervention (01593)  Min: 20 min      Knee mobs/PROM 1 8 Rotational TF mobs, flexion mobs   Tib/Fem Mobs      Patella Mobs 1 2    Ankle mobs      IASTM 1 10 Medial joint line, patellar tendon         NMR re-education (06946)  Min:   CUES NEEDED   Omani/Biofeedback 10/10      BFR      G. Med activation      Hip Ext full ROM/ G. Activation      Bosu Bal and Prop- G Med      Single leg stance/Balance/Prop      Bosu Retro G. Med act      Prone Hip froggers- sliders/elevated            Therapeutic Activity (85058)  Min:      Ladders      Plyos      Dynamic Balance                    Therapeutic Exercise and NMR EXR  [x] (22603) Provided verbal/tactile cueing for activities related to strengthening, flexibility, endurance, ROM for improvements in LE, proximal hip, and core control with self care, mobility, lifting, ambulation. [x] (99669) Provided verbal/tactile cueing for activities related to improving balance, coordination, kinesthetic sense, posture, motor skill, proprioception  to assist with LE, proximal hip, and core control in self care, mobility, lifting, ambulation and eccentric single leg control.      NMR and Therapeutic Activities:    [x] (83264 or 03174) Provided verbal/tactile cueing for activities related to improving balance, coordination, kinesthetic sense, posture, motor skill, proprioception and motor activation to allow for proper function of core, proximal hip and LE with self care and ADLs and functional mobility.   [] (11067) Gait Re-education- Provided training and instruction to the patient for proper LE, core and proximal hip recruitment and positioning and eccentric body weight control with ambulation re-education including up and down stairs     Home Exercise Program:    [x] (07152) Reviewed/Progressed HEP activities related to strengthening, flexibility, endurance, ROM of core, proximal hip and LE for functional self-care, mobility, lifting and ambulation/stair navigation   [] (20038)Reviewed/Progressed HEP activities related to improving balance, coordination, kinesthetic sense, posture, motor skill, proprioception of core, proximal hip and LE for self care, mobility, lifting, and ambulation/stair navigation      Manual Treatments:  PROM / STM / Oscillations-Mobs:  G-I, II, III, IV (PA's, Inf., Post.)  [x] (39365) Provided manual therapy to mobilize LE, proximal hip and/or LS spine soft tissue/joints for the purpose of modulating pain, promoting relaxation,  increasing ROM, reducing/eliminating soft tissue swelling/inflammation/restriction, improving soft tissue extensibility and allowing for proper ROM for normal function with self care, mobility, lifting and ambulation. Modalities:     [] GAME READY (VASO)- for significant edema, swelling, pain control. Charges:  Timed Code Treatment Minutes: 45   Total Treatment Minutes: 45      [] EVAL (LOW) 67594 (typically 20 minutes face-to-face)  [] EVAL (MOD) 73450 (typically 30 minutes face-to-face)  [] EVAL (HIGH) 81610 (typically 45 minutes face-to-face)  [] RE-EVAL     [x] CH(21889) x  2  [] DRY NEEDLE 1 OR 2 MUSCLES  [] NMR (24797) x     [] DRY NEEDLE 3+ MUSCLES  [x] Manual (41032) x 1      [] TA (93978) x     [] Mech Traction (78796)  [] ES(attended) (58902)     [] ES (un) (11834):   [] VASO (27387)  [] Other:    If [facility] Please Indicate Time In/Out  CPT Code Time in Time out                                   GOALS:  Patient stated goal: reduce knee pain   [x] Progressing: [] Met: [] Not Met: [] Adjusted    Therapist goals for Patient:   Short Term Goals: To be achieved in: 2 weeks  1. Independent in HEP and progression per patient tolerance, in order to prevent re-injury.    [] Progressing: [x] Met: [] Not Met: [] Adjusted  2. Patient will have a decrease in pain to facilitate improvement in movement, function, and ADLs as indicated by Functional Deficits. [x] Progressing: [] Met: [] Not Met: [] Adjusted    Long Term Goals: To be achieved in: 6 weeks  1. Disability index score of 25% or less for the LEFS to assist with reaching prior level of function. [] Progressing: [x] Met: [] Not Met: [] Adjusted  2. Patient will demonstrate an increase in Strength to good proximal hip strength and control, within 5lb HHD in LE to allow for proper functional mobility as indicated by patients Functional Deficits. [x] Progressing: [] Met: [] Not Met: [] Adjusted  3. Patient will return to standing and sitting functional activities without increased symptoms or restriction. [] Progressing: [x] Met: [] Not Met: [] Adjusted  4. Patient will report being able to perform stairs without increased symptoms or restriction. (patient specific functional goal)    [x] Progressing: [] Met: [] Not Met: [] Adjusted    ASSESSMENT: Good tolerance to treatment. Mild improvement in discomfort at conclusion of session. Pain still primarily along medial joint line and medial joint soft tissue. Appropriately fatigued at conclusion. Will follow up with MD this week and call with further plans.      Return to Play: (if applicable)   []  Stage 1: Intro to Strength   []  Stage 2: Return to Run and Strength   []  Stage 3: Return to Jump and Strength   []  Stage 4: Dynamic Strength and Agility   []  Stage 5: Sport Specific Training     []  Ready to Return to Play, Meets All Above Stages   []  Not Ready for Return to Sports   Comments:            Treatment/Activity Tolerance:  [x] Patient tolerated treatment well [] Patient limited by fatique  [] Patient limited by pain  [] Patient limited by other medical complications  [] Other:     Overall Progression Towards Functional goals/ Treatment Progress Update:  [x] Patient is progressing as expected towards functional goals listed. [] Progression is slowed due to complexities/Impairments listed. [] Progression has been slowed due to co-morbidities. [] Plan just implemented, too soon to assess goals progression <30days   [] Goals require adjustment due to lack of progress  [] Patient is not progressing as expected and requires additional follow up with physician  [] Other    Prognosis for POC: [x] Good [] Fair  [] Poor    Patient requires continued skilled intervention: [x] Yes  [] No        PLAN: See eval  [x] Continue per plan of care [] Alter current plan (see comments)  [] Plan of care initiated [] Hold pending MD visit [] Discharge    Electronically signed by: Weston Balderrama, PT    Note: If patient does not return for scheduled/recommended follow up visits, this note will serve as a discharge from care along with the most recent update on progress.

## 2021-06-24 ENCOUNTER — APPOINTMENT (OUTPATIENT)
Dept: PHYSICAL THERAPY | Age: 66
End: 2021-06-24
Payer: MEDICARE

## 2021-07-01 ENCOUNTER — HOSPITAL ENCOUNTER (OUTPATIENT)
Dept: PHYSICAL THERAPY | Age: 66
Setting detail: THERAPIES SERIES
Discharge: HOME | End: 2021-07-01
Payer: MEDICARE

## 2021-07-01 PROCEDURE — 97110 THERAPEUTIC EXERCISES: CPT

## 2021-07-01 PROCEDURE — 97140 MANUAL THERAPY 1/> REGIONS: CPT

## 2021-07-01 NOTE — FLOWSHEET NOTE
difficulty with eccentric control   Swissball wall rolls- in SLS- hip drive      Quad hip ext/wall-ball rolls                  Manual Intervention (95687)  Min: 20 min      Knee mobs/PROM 1 8 Rotational TF mobs, flexion mobs   Tib/Fem Mobs      Patella Mobs 1 2    Ankle mobs      IASTM 1 10 Medial joint line, patellar tendon         NMR re-education (50721)  Min:   CUES NEEDED   [place]ibian/Biofeedback 10/10      BFR      G. Med activation      Hip Ext full ROM/ G. Activation      Bosu Bal and Prop- G Med      Single leg stance/Balance/Prop      Bosu Retro G. Med act      Prone Hip froggers- sliders/elevated            Therapeutic Activity (45182)  Min:      Ladders      Plyos      Dynamic Balance                    Therapeutic Exercise and NMR EXR  [x] (64423) Provided verbal/tactile cueing for activities related to strengthening, flexibility, endurance, ROM for improvements in LE, proximal hip, and core control with self care, mobility, lifting, ambulation. [x] (73076) Provided verbal/tactile cueing for activities related to improving balance, coordination, kinesthetic sense, posture, motor skill, proprioception  to assist with LE, proximal hip, and core control in self care, mobility, lifting, ambulation and eccentric single leg control.      NMR and Therapeutic Activities:    [x] (51711 or 78483) Provided verbal/tactile cueing for activities related to improving balance, coordination, kinesthetic sense, posture, motor skill, proprioception and motor activation to allow for proper function of core, proximal hip and LE with self care and ADLs and functional mobility.   [] (78065) Gait Re-education- Provided training and instruction to the patient for proper LE, core and proximal hip recruitment and positioning and eccentric body weight control with ambulation re-education including up and down stairs     Home Exercise Program:    [x] (17600) Reviewed/Progressed HEP activities related to strengthening, flexibility, endurance, ROM of core, proximal hip and LE for functional self-care, mobility, lifting and ambulation/stair navigation   [] (33850)Reviewed/Progressed HEP activities related to improving balance, coordination, kinesthetic sense, posture, motor skill, proprioception of core, proximal hip and LE for self care, mobility, lifting, and ambulation/stair navigation      Manual Treatments:  PROM / STM / Oscillations-Mobs:  G-I, II, III, IV (PA's, Inf., Post.)  [x] (97920) Provided manual therapy to mobilize LE, proximal hip and/or LS spine soft tissue/joints for the purpose of modulating pain, promoting relaxation,  increasing ROM, reducing/eliminating soft tissue swelling/inflammation/restriction, improving soft tissue extensibility and allowing for proper ROM for normal function with self care, mobility, lifting and ambulation. Modalities:     [] GAME READY (VASO)- for significant edema, swelling, pain control. Charges:  Timed Code Treatment Minutes: 45   Total Treatment Minutes: 45      [] EVAL (LOW) 66720 (typically 20 minutes face-to-face)  [] EVAL (MOD) 55475 (typically 30 minutes face-to-face)  [] EVAL (HIGH) 46369 (typically 45 minutes face-to-face)  [] RE-EVAL     [x] GW(51614) x  2  [] DRY NEEDLE 1 OR 2 MUSCLES  [] NMR (17581) x     [] DRY NEEDLE 3+ MUSCLES  [x] Manual (20811) x 1      [] TA (15627) x     [] Mech Traction (84697)  [] ES(attended) (26060)     [] ES (un) (71706):   [] VASO (94253)  [] Other:    If [facility] Please Indicate Time In/Out  CPT Code Time in Time out                                   GOALS:  Patient stated goal: reduce knee pain   [x] Progressing: [] Met: [] Not Met: [] Adjusted    Therapist goals for Patient:   Short Term Goals: To be achieved in: 2 weeks  1. Independent in HEP and progression per patient tolerance, in order to prevent re-injury. [] Progressing: [x] Met: [] Not Met: [] Adjusted  2.  Patient will have a decrease in pain to facilitate improvement in movement, function, and ADLs as indicated by Functional Deficits. [x] Progressing: [] Met: [] Not Met: [] Adjusted    Long Term Goals: To be achieved in: 6 weeks  1. Disability index score of 25% or less for the LEFS to assist with reaching prior level of function. [] Progressing: [x] Met: [] Not Met: [] Adjusted  2. Patient will demonstrate an increase in Strength to good proximal hip strength and control, within 5lb HHD in LE to allow for proper functional mobility as indicated by patients Functional Deficits. [x] Progressing: [] Met: [] Not Met: [] Adjusted  3. Patient will return to standing and sitting functional activities without increased symptoms or restriction. [] Progressing: [x] Met: [] Not Met: [] Adjusted  4. Patient will report being able to perform stairs without increased symptoms or restriction. (patient specific functional goal)    [x] Progressing: [] Met: [] Not Met: [] Adjusted    ASSESSMENT: Good tolerance to treatment. Improvement in discomfort at conclusion of session. Pain still primarily along medial joint line and medial joint soft tissue. Appropriately fatigued at conclusion. Return to Play: (if applicable)   []  Stage 1: Intro to Strength   []  Stage 2: Return to Run and Strength   []  Stage 3: Return to Jump and Strength   []  Stage 4: Dynamic Strength and Agility   []  Stage 5: Sport Specific Training     []  Ready to Return to Play, Meets All Above Stages   []  Not Ready for Return to Sports   Comments:            Treatment/Activity Tolerance:  [x] Patient tolerated treatment well [] Patient limited by fatique  [] Patient limited by pain  [] Patient limited by other medical complications  [] Other:     Overall Progression Towards Functional goals/ Treatment Progress Update:  [x] Patient is progressing as expected towards functional goals listed. [] Progression is slowed due to complexities/Impairments listed. [] Progression has been slowed due to co-morbidities.   [] Plan just implemented, too soon to assess goals progression <30days   [] Goals require adjustment due to lack of progress  [] Patient is not progressing as expected and requires additional follow up with physician  [] Other    Prognosis for POC: [x] Good [] Fair  [] Poor    Patient requires continued skilled intervention: [x] Yes  [] No        PLAN: See eval  [x] Continue per plan of care [] Alter current plan (see comments)  [] Plan of care initiated [] Hold pending MD visit [] Discharge    Electronically signed by: Hilary Dennis PT    Note: If patient does not return for scheduled/recommended follow up visits, this note will serve as a discharge from care along with the most recent update on progress.

## 2021-07-09 ENCOUNTER — HOSPITAL ENCOUNTER (OUTPATIENT)
Dept: PHYSICAL THERAPY | Age: 66
Setting detail: THERAPIES SERIES
Discharge: HOME | End: 2021-07-09
Payer: MEDICARE

## 2021-07-09 PROCEDURE — 97110 THERAPEUTIC EXERCISES: CPT

## 2021-07-09 PROCEDURE — 97140 MANUAL THERAPY 1/> REGIONS: CPT

## 2021-07-09 NOTE — FLOWSHEET NOTE
[facility] [address] [address]  Phone: [phone] Fax: [phone]       Physical Therapy Treatment Note/ Progress Report:     Date:  2021    Patient Name:  Yo An    :  1955  MRN: [id number]  Restrictions/Precautions:    Medical/Treatment Diagnosis Information:  Diagnosis: medial meniscus tear, L knee pain  Treatment Diagnosis: L knee pain Y96.926  Insurance/Certification information:  PT Insurance Information: [facility]  Physician Information:  Referring Practitioner: Dr Thony Alexander of care signed (Y/N):     Date of Patient follow up with Physician:       Progress Report: [x]  Yes  []  No     Date Range for reporting period:  Beginnin2021  Endin2021    Progress report due (10 Rx/or 30 days whichever is less): 33     Recertification due (POC duration/ or 90 days whichever is less): 6/15/2021     Visit # Insurance Allowable Auth Needed   11 Medicare/[facility] (med guidelines) []Yes   [x]No     Latex Allergy:  [x]NO      []YES  Preferred Language for Healthcare:   [x]English       []other:  Functional Scale: LEFS 22.5% disability Date assessed:2021    Pain level:  2-3/10     SUBJECTIVE:  Patient reports that his knee has been more sore today than it was last week. States that it is sore along medial aspect of knee. Did a lot of exercises in the pool on Wed and had massage yesterday.            OBJECTIVE:    Observation:    Test measurements:      RESTRICTIONS/PRECAUTIONS: none    Exercises/Interventions:     Therapeutic Ex (85394)   Min: 25 Sets/sec Reps Notes/CUES   Retro Stepper/BIKE 1 5    BOSU lunge 10 10 Cues form   SC  20    Wall sit- cues heels 5sec 20 80-90 degrees                     Hip Ext /table      BOSU fwd/side lunge      BOSU squat held     Leg Press Iso/Con/Ecc 0- 2 10 70 lb, 2 to 1   Cybex HS curl      TKE      Glute side walks 2 2 maroon   RDL      Slide Lunge 5 10 Slide HS eccentrics      Step ups/ecc step down 1 15 6 in- difficulty with eccentric control   Swissball wall rolls- in SLS- hip drive      Quad hip ext/wall-ball rolls                  Manual Intervention (55868)  Min: 22 min      Knee mobs/PROM 1 6 Rotational TF mobs, flexion mobs   Tib/Fem Mobs      Patella Mobs 1 2    L hip belt mobs 1 4    IASTM 1 10 Medial joint line, patellar tendon         NMR re-education (06643)  Min:   CUES NEEDED   [place]renadian/Biofeedback 10/10      BFR      G. Med activation      Hip Ext full ROM/ G. Activation      Bosu Bal and Prop- G Med      Single leg stance/Balance/Prop      Bosu Retro G. Med act      Prone Hip froggers- sliders/elevated            Therapeutic Activity (26451)  Min:      Ladders      Plyos      Dynamic Balance                    Therapeutic Exercise and NMR EXR  [x] (39912) Provided verbal/tactile cueing for activities related to strengthening, flexibility, endurance, ROM for improvements in LE, proximal hip, and core control with self care, mobility, lifting, ambulation. [x] (95598) Provided verbal/tactile cueing for activities related to improving balance, coordination, kinesthetic sense, posture, motor skill, proprioception  to assist with LE, proximal hip, and core control in self care, mobility, lifting, ambulation and eccentric single leg control.      NMR and Therapeutic Activities:    [x] (42019 or 61083) Provided verbal/tactile cueing for activities related to improving balance, coordination, kinesthetic sense, posture, motor skill, proprioception and motor activation to allow for proper function of core, proximal hip and LE with self care and ADLs and functional mobility.   [] (14949) Gait Re-education- Provided training and instruction to the patient for proper LE, core and proximal hip recruitment and positioning and eccentric body weight control with ambulation re-education including up and down stairs     Home Exercise Program:    [x] (70526) Reviewed/Progressed HEP activities related to strengthening, flexibility, endurance, ROM of core, proximal hip and LE for functional self-care, mobility, lifting and ambulation/stair navigation   [] (05524)Reviewed/Progressed HEP activities related to improving balance, coordination, kinesthetic sense, posture, motor skill, proprioception of core, proximal hip and LE for self care, mobility, lifting, and ambulation/stair navigation      Manual Treatments:  PROM / STM / Oscillations-Mobs:  G-I, II, III, IV (PA's, Inf., Post.)  [x] (63037) Provided manual therapy to mobilize LE, proximal hip and/or LS spine soft tissue/joints for the purpose of modulating pain, promoting relaxation,  increasing ROM, reducing/eliminating soft tissue swelling/inflammation/restriction, improving soft tissue extensibility and allowing for proper ROM for normal function with self care, mobility, lifting and ambulation. Modalities:     [] GAME READY (VASO)- for significant edema, swelling, pain control. Charges:  Timed Code Treatment Minutes: 47   Total Treatment Minutes: 47      [] EVAL (LOW) 98738 (typically 20 minutes face-to-face)  [] EVAL (MOD) 39667 (typically 30 minutes face-to-face)  [] EVAL (HIGH) 29425 (typically 45 minutes face-to-face)  [] RE-EVAL     [x] UY(60029) x  2  [] DRY NEEDLE 1 OR 2 MUSCLES  [] NMR (43851) x     [] DRY NEEDLE 3+ MUSCLES  [x] Manual (32844) x 1      [] TA (48264) x     [] Mech Traction (02048)  [] ES(attended) (39750)     [] ES (un) (19139):   [] VASO (04315)  [] Other:    If [facility] Please Indicate Time In/Out  CPT Code Time in Time out                                   GOALS:  Patient stated goal: reduce knee pain   [x] Progressing: [] Met: [] Not Met: [] Adjusted    Therapist goals for Patient:   Short Term Goals: To be achieved in: 2 weeks  1. Independent in HEP and progression per patient tolerance, in order to prevent re-injury. [] Progressing: [x] Met: [] Not Met: [] Adjusted  2.  Patient slowed due to complexities/Impairments listed. [] Progression has been slowed due to co-morbidities. [] Plan just implemented, too soon to assess goals progression <30days   [] Goals require adjustment due to lack of progress  [] Patient is not progressing as expected and requires additional follow up with physician  [] Other    Prognosis for POC: [x] Good [] Fair  [] Poor    Patient requires continued skilled intervention: [x] Yes  [] No        PLAN: See eval  [x] Continue per plan of care [] Alter current plan (see comments)  [] Plan of care initiated [] Hold pending MD visit [] Discharge    Electronically signed by: Kevin Dorman PT    Note: If patient does not return for scheduled/recommended follow up visits, this note will serve as a discharge from care along with the most recent update on progress.

## 2021-07-13 ENCOUNTER — HOSPITAL ENCOUNTER (OUTPATIENT)
Dept: PHYSICAL THERAPY | Age: 66
Setting detail: THERAPIES SERIES
Discharge: HOME | End: 2021-07-13
Payer: MEDICARE

## 2021-07-13 PROCEDURE — 97110 THERAPEUTIC EXERCISES: CPT

## 2021-07-13 PROCEDURE — 97140 MANUAL THERAPY 1/> REGIONS: CPT

## 2021-07-13 NOTE — FLOWSHEET NOTE
[address] [address] [address]  Phone: [phone] Fax: [phone]       Physical Therapy Treatment Note/ Progress Report:     Date:  2021    Patient Name:  Bo Back    :  1955  MRN: [id number]  Restrictions/Precautions:    Medical/Treatment Diagnosis Information:  Diagnosis: medial meniscus tear, L knee pain  Treatment Diagnosis: L knee pain D44.347  Insurance/Certification information:  PT Insurance Information: [facility]  Physician Information:  Referring Practitioner: Dr Marta Junior  care signed (Y/N):     Date of Patient follow up with Physician:       Progress Report: [x]  Yes  []  No     Date Range for reporting period:  Beginnin2021  Endin2021    Progress report due (10 Rx/or 30 days whichever is less): 3/23/79     Recertification due (POC duration/ or 90 days whichever is less): 6/15/2021     Visit # Insurance Allowable Auth Needed   13 Medicare/[facility] (med guidelines) []Yes   [x]No     Latex Allergy:  [x]NO      []YES  Preferred Language for Healthcare:   [x]English       []other:  Functional Scale: LEFS 22.5% disability Date assessed:2021    Pain level:  2-3/10     SUBJECTIVE:  Patient reports that his knee was less sore after last session. Not as much pain as he did the week prior. Didn't need knee brace as much. Did use yesterday and knee felt good. Notes that his knee was very sore driving to therapy today due to the angle of the seat. Hasn't been able to do as many exercises in the pool due to the weather.   .           OBJECTIVE:    Observation:    Test measurements:      RESTRICTIONS/PRECAUTIONS: none    Exercises/Interventions:     Therapeutic Ex (09850)   Min: 15 Sets/sec Reps Notes/CUES   Retro Stepper/BIKE 1 5    BOSU lunge 10 10 Cues form   SC     Wall sit- cues heels 5sec 20 80-90 degrees                     Hip Ext /table      BOSU fwd/side lunge      BOSU squat held     Leg Press Iso/Con/Ecc 0- 2 10 70 lb, 2 to 1   Cybex HS curl      TKE      Glute side walks 2 2 maroon   RDL      Slide Lunge 5 10    Slide HS eccentrics      Step ups/ecc step down 1 15 6 in- difficulty with eccentric control   Swissball wall rolls- in SLS- hip drive      Quad hip ext/wall-ball rolls                  Manual Intervention (77009)  Min: 20 min      Knee mobs/PROM 1 4 Rotational TF mobs, flexion mobs   Tib/Fem Mobs      Patella Mobs 1 2    L hip belt mobs 1 4    IASTM 1 10 Medial joint line, patellar tendon         NMR re-education (68697)  Min:   CUES NEEDED   Singaporean/Biofeedback 10/10      BFR      G. Med activation      Hip Ext full ROM/ G. Activation      Bosu Bal and Prop- G Med      Single leg stance/Balance/Prop      Bosu Retro G. Med act      Prone Hip froggers- sliders/elevated            Therapeutic Activity (88297)  Min:      Ladders      Plyos      Dynamic Balance                    Therapeutic Exercise and NMR EXR  [x] (55426) Provided verbal/tactile cueing for activities related to strengthening, flexibility, endurance, ROM for improvements in LE, proximal hip, and core control with self care, mobility, lifting, ambulation. [x] (32526) Provided verbal/tactile cueing for activities related to improving balance, coordination, kinesthetic sense, posture, motor skill, proprioception  to assist with LE, proximal hip, and core control in self care, mobility, lifting, ambulation and eccentric single leg control.      NMR and Therapeutic Activities:    [x] (65751 or 61774) Provided verbal/tactile cueing for activities related to improving balance, coordination, kinesthetic sense, posture, motor skill, proprioception and motor activation to allow for proper function of core, proximal hip and LE with self care and ADLs and functional mobility.   [] (40462) Gait Re-education- Provided training and instruction to the patient for proper LE, core and proximal hip recruitment and positioning and eccentric body weight control with ambulation re-education including up and down stairs     Home Exercise Program:    [x] (73199) Reviewed/Progressed HEP activities related to strengthening, flexibility, endurance, ROM of core, proximal hip and LE for functional self-care, mobility, lifting and ambulation/stair navigation   [] (62996)Reviewed/Progressed HEP activities related to improving balance, coordination, kinesthetic sense, posture, motor skill, proprioception of core, proximal hip and LE for self care, mobility, lifting, and ambulation/stair navigation      Manual Treatments:  PROM / STM / Oscillations-Mobs:  G-I, II, III, IV (PA's, Inf., Post.)  [x] (42618) Provided manual therapy to mobilize LE, proximal hip and/or LS spine soft tissue/joints for the purpose of modulating pain, promoting relaxation,  increasing ROM, reducing/eliminating soft tissue swelling/inflammation/restriction, improving soft tissue extensibility and allowing for proper ROM for normal function with self care, mobility, lifting and ambulation. Modalities:     [] GAME READY (VASO)- for significant edema, swelling, pain control. Charges:  Timed Code Treatment Minutes: 35   Total Treatment Minutes: 35      [] EVAL (LOW) 97584 (typically 20 minutes face-to-face)  [] EVAL (MOD) 98907 (typically 30 minutes face-to-face)  [] EVAL (HIGH) 32293 (typically 45 minutes face-to-face)  [] RE-EVAL     [x] TL(96421) x  1 KX [] DRY NEEDLE 1 OR 2 MUSCLES  [] NMR (52812) x     [] DRY NEEDLE 3+ MUSCLES  [x] Manual (07131) x 1   KX   [] TA (39230) x     [facility] (16713[facility]  [] ES(attended) (93083)     [] ES (un) (91036):   [] VASO (30247)  [] Other:    If [facility] Please Indicate Time In/Out  CPT Code Time in Time out                                   GOALS:  Patient stated goal: reduce knee pain   [x] Progressing: [] Met: [] Not Met: [] Adjusted    Therapist goals for Patient:   Short Term Goals: To be achieved in: 2 weeks  1. Independent in HEP and progression per patient tolerance, in order to prevent re-injury. [] Progressing: [x] Met: [] Not Met: [] Adjusted  2. Patient will have a decrease in pain to facilitate improvement in movement, function, and ADLs as indicated by Functional Deficits. [x] Progressing: [] Met: [] Not Met: [] Adjusted    Long Term Goals: To be achieved in: 6 weeks  1. Disability index score of 25% or less for the LEFS to assist with reaching prior level of function. [] Progressing: [x] Met: [] Not Met: [] Adjusted  2. Patient will demonstrate an increase in Strength to good proximal hip strength and control, within 5lb HHD in LE to allow for proper functional mobility as indicated by patients Functional Deficits. [x] Progressing: [] Met: [] Not Met: [] Adjusted  3. Patient will return to standing and sitting functional activities without increased symptoms or restriction. [] Progressing: [x] Met: [] Not Met: [] Adjusted  4. Patient will report being able to perform stairs without increased symptoms or restriction. (patient specific functional goal)    [x] Progressing: [] Met: [] Not Met: [] Adjusted    ASSESSMENT: Good tolerance to treatment and manual therapy with improvement in overall mobility and restriction in TF joint. Continues to progress in regards to strength in knee. Less overall discomfort at conclusion of session.-not needing knee brace at conclusion of session. Patient appropriately fatigued at conclusion.       Return to Play: (if applicable)   []  Stage 1: Intro to Strength   []  Stage 2: Return to Run and Strength   []  Stage 3: Return to Jump and Strength   []  Stage 4: Dynamic Strength and Agility   []  Stage 5: Sport Specific Training     []  Ready to Return to Play, Meets All Above Stages   []  Not Ready for Return to Sports   Comments:            Treatment/Activity Tolerance:  [x] Patient tolerated treatment well [] Patient limited by fatique  [] Patient limited by pain  [] Patient limited by other medical complications  [] Other:     Overall Progression Towards Functional goals/ Treatment Progress Update:  [x] Patient is progressing as expected towards functional goals listed. [] Progression is slowed due to complexities/Impairments listed. [] Progression has been slowed due to co-morbidities. [] Plan just implemented, too soon to assess goals progression <30days   [] Goals require adjustment due to lack of progress  [] Patient is not progressing as expected and requires additional follow up with physician  [] Other    Prognosis for POC: [x] Good [] Fair  [] Poor    Patient requires continued skilled intervention: [x] Yes  [] No        PLAN: See eval  [x] Continue per plan of care [] Alter current plan (see comments)  [] Plan of care initiated [] Hold pending MD visit [] Discharge    Electronically signed by: [place] PT    Note: If patient does not return for scheduled/recommended follow up visits, this note will serve as a discharge from care along with the most recent update on progress.

## 2021-07-21 ENCOUNTER — APPOINTMENT (OUTPATIENT)
Dept: PHYSICAL THERAPY | Age: 66
End: 2021-07-21
Payer: MEDICARE

## 2021-07-30 ENCOUNTER — HOSPITAL ENCOUNTER (OUTPATIENT)
Dept: PHYSICAL THERAPY | Age: 66
Setting detail: THERAPIES SERIES
Discharge: HOME | End: 2021-07-30
Payer: MEDICARE

## 2021-07-30 PROCEDURE — 97110 THERAPEUTIC EXERCISES: CPT

## 2021-07-30 PROCEDURE — 97140 MANUAL THERAPY 1/> REGIONS: CPT

## 2021-07-30 NOTE — FLOWSHEET NOTE
[facility] [address] [facility] [address]  Phone: [phone] Fax: [phone]       Physical Therapy Treatment Note/ Progress Report:     Date:  2021    Patient Name:  Aurea Cranker    :  1955  MRN: [id number]  Restrictions/Precautions:    Medical/Treatment Diagnosis Information:  Diagnosis: medial meniscus tear, L knee pain  Treatment Diagnosis: L knee pain D87.352  Insurance/Certification information:  PT Insurance Information: [facility]  Physician Information:  Referring Practitioner: Dr Breanna Millan  care signed (Y/N):     Date of Patient follow up with Physician:       Progress Report: [x]  Yes  []  No     Date Range for reporting period:  Beginnin2021  Endin2021    Progress report due (10 Rx/or 30 days whichever is less):      Recertification due (POC duration/ or 90 days whichever is less): 6/15/2021     Visit # Insurance Allowable Auth Needed   14 Medicare/[facility] (med guidelines) []Yes   [x]No     Latex Allergy:  [x]NO      []YES  Preferred Language for Healthcare:   [x]English       []other:  Functional Scale: LEFS 22.5% disability Date assessed:2021    Pain level:  2-3/10     SUBJECTIVE:  Patient reports that his knee was less sore after last session. Feels more stiff today as he hasn't been seen for 2 weeks. Feeling more along the inside of his L knee. Reports that he has been trying to do more exercises in the pool. .           OBJECTIVE:    Observation:    Test measurements:      RESTRICTIONS/PRECAUTIONS: none    Exercises/Interventions:     Therapeutic Ex (25836)   Min: 15 Sets/sec Reps Notes/CUES   Retro Stepper/BIKE 1 5    BOSU lunge 10 10 Cues form   SC     Wall sit- cues heels 5sec 20 80-90 degrees                     Hip Ext /table      BOSU fwd/side lunge      BOSU squat held     Leg Press Iso/Con/Ecc 0- 2 10 70 lb, 2 to 1   Cybex HS curl      TRX partial squat 1 15 Glute side walks 2 2 maroon   RDL      Slide Lunge 5 10    Slide HS eccentrics      Step ups/ecc step down 1 15 6 in- difficulty with eccentric control   Swissball wall rolls- in SLS- hip drive      Quad hip ext/wall-ball rolls                  Manual Intervention (42250)  Min: 20 min      Knee mobs/PROM 1 4 Rotational TF mobs, flexion mobs   Tib/Fem Mobs      Patella Mobs 1 2    L hip belt mobs 1 4    IASTM 1 10 Medial joint line, patellar tendon         NMR re-education (47101)  Min:   CUES NEEDED   Ukrainian/Biofeedback 10/10      BFR      G. Med activation      Hip Ext full ROM/ G. Activation      Bosu Bal and Prop- G Med      Single leg stance/Balance/Prop      Bosu Retro G. Med act      Prone Hip froggers- sliders/elevated            Therapeutic Activity (63991)  Min:      Ladders      Plyos      Dynamic Balance                    Therapeutic Exercise and NMR EXR  [x] (35537) Provided verbal/tactile cueing for activities related to strengthening, flexibility, endurance, ROM for improvements in LE, proximal hip, and core control with self care, mobility, lifting, ambulation. [x] (27179) Provided verbal/tactile cueing for activities related to improving balance, coordination, kinesthetic sense, posture, motor skill, proprioception  to assist with LE, proximal hip, and core control in self care, mobility, lifting, ambulation and eccentric single leg control.      NMR and Therapeutic Activities:    [x] (09821 or 16871) Provided verbal/tactile cueing for activities related to improving balance, coordination, kinesthetic sense, posture, motor skill, proprioception and motor activation to allow for proper function of core, proximal hip and LE with self care and ADLs and functional mobility.   [] (50354) Gait Re-education- Provided training and instruction to the patient for proper LE, core and proximal hip recruitment and positioning and eccentric body weight control with ambulation re-education including up and down stairs     Home Exercise Program:    [x] (05332) Reviewed/Progressed HEP activities related to strengthening, flexibility, endurance, ROM of core, proximal hip and LE for functional self-care, mobility, lifting and ambulation/stair navigation   [] (82366)Reviewed/Progressed HEP activities related to improving balance, coordination, kinesthetic sense, posture, motor skill, proprioception of core, proximal hip and LE for self care, mobility, lifting, and ambulation/stair navigation      Manual Treatments:  PROM / STM / Oscillations-Mobs:  G-I, II, III, IV (PA's, Inf., Post.)  [x] (62942) Provided manual therapy to mobilize LE, proximal hip and/or LS spine soft tissue/joints for the purpose of modulating pain, promoting relaxation,  increasing ROM, reducing/eliminating soft tissue swelling/inflammation/restriction, improving soft tissue extensibility and allowing for proper ROM for normal function with self care, mobility, lifting and ambulation. Modalities:     [] GAME READY (VASO)- for significant edema, swelling, pain control. Charges:  Timed Code Treatment Minutes: 35   Total Treatment Minutes: 35      [] EVAL (LOW) 23879 (typically 20 minutes face-to-face)  [] EVAL (MOD) 72797 (typically 30 minutes face-to-face)  [] EVAL (HIGH) 37217 (typically 45 minutes face-to-face)  [] RE-EVAL     [x] LD(13818) x  1 KX [] DRY NEEDLE 1 OR 2 MUSCLES  [] NMR (44102) x     [] DRY NEEDLE 3+ MUSCLES  [x] Manual (49811) x 1   KX   [] TA (76641) x     [] Mech Traction (78484)  [] ES(attended) (57799)     [] ES (un) (25057):   [] VASO (39261)  [] Other:    If BW Please Indicate Time In/Out  CPT Code Time in Time out                                   GOALS:  Patient stated goal: reduce knee pain   [x] Progressing: [] Met: [] Not Met: [] Adjusted    Therapist goals for Patient:   Short Term Goals: To be achieved in: 2 weeks  1. Independent in HEP and progression per patient tolerance, in order to prevent re-injury.    [] Progressing: [x] Met: [] Not Met: [] Adjusted  2. Patient will have a decrease in pain to facilitate improvement in movement, function, and ADLs as indicated by Functional Deficits. [x] Progressing: [] Met: [] Not Met: [] Adjusted    Long Term Goals: To be achieved in: 6 weeks  1. Disability index score of 25% or less for the LEFS to assist with reaching prior level of function. [] Progressing: [x] Met: [] Not Met: [] Adjusted  2. Patient will demonstrate an increase in Strength to good proximal hip strength and control, within 5lb HHD in LE to allow for proper functional mobility as indicated by patients Functional Deficits. [x] Progressing: [] Met: [] Not Met: [] Adjusted  3. Patient will return to standing and sitting functional activities without increased symptoms or restriction. [] Progressing: [x] Met: [] Not Met: [] Adjusted  4. Patient will report being able to perform stairs without increased symptoms or restriction. (patient specific functional goal)    [x] Progressing: [] Met: [] Not Met: [] Adjusted    ASSESSMENT: Good tolerance to treatment and manual therapy with improvement in overall mobility and restriction in TF joint. Reduction in medial knee pain at conclusion. Continues to progress in regards to strength in knee. Challenged appropriately with exercises. Not needing knee brace at conclusion of session. Patient appropriately fatigued at conclusion.       Return to Play: (if applicable)   []  Stage 1: Intro to Strength   []  Stage 2: Return to Run and Strength   []  Stage 3: Return to Jump and Strength   []  Stage 4: Dynamic Strength and Agility   []  Stage 5: Sport Specific Training     []  Ready to Return to Play, Meets All Above Stages   []  Not Ready for Return to Sports   Comments:            Treatment/Activity Tolerance:  [x] Patient tolerated treatment well [] Patient limited by fatique  [] Patient limited by pain  [] Patient limited by other medical complications  [] Other: Overall Progression Towards Functional goals/ Treatment Progress Update:  [x] Patient is progressing as expected towards functional goals listed. [] Progression is slowed due to complexities/Impairments listed. [] Progression has been slowed due to co-morbidities. [] Plan just implemented, too soon to assess goals progression <30days   [] Goals require adjustment due to lack of progress  [] Patient is not progressing as expected and requires additional follow up with physician  [] Other    Prognosis for POC: [x] Good [] Fair  [] Poor    Patient requires continued skilled intervention: [x] Yes  [] No        PLAN: See eval  [x] Continue per plan of care [] Alter current plan (see comments)  [] Plan of care initiated [] Hold pending MD visit [] Discharge    Electronically signed by: Mukesh Peralta PT    Note: If patient does not return for scheduled/recommended follow up visits, this note will serve as a discharge from care along with the most recent update on progress.

## 2021-08-04 ENCOUNTER — APPOINTMENT (OUTPATIENT)
Dept: PHYSICAL THERAPY | Age: 66
End: 2021-08-04
Payer: MEDICARE

## 2021-08-11 ENCOUNTER — HOSPITAL ENCOUNTER (OUTPATIENT)
Dept: PHYSICAL THERAPY | Age: 66
Setting detail: THERAPIES SERIES
Discharge: HOME | End: 2021-08-11
Payer: MEDICARE

## 2021-08-11 PROCEDURE — 97110 THERAPEUTIC EXERCISES: CPT

## 2021-08-11 PROCEDURE — 97140 MANUAL THERAPY 1/> REGIONS: CPT

## 2021-08-11 NOTE — FLOWSHEET NOTE
[place] [address] [facility] [address]  Phone: [phone] Fax: [phone]       Physical Therapy Treatment Note/ Progress Report:     Date:  2021    Patient Name:  Odilon Oneil    :  1955  MRN: [id number]  Restrictions/Precautions:    Medical/Treatment Diagnosis Information:  Diagnosis: medial meniscus tear, L knee pain  Treatment Diagnosis: L knee pain A03.979  Insurance/Certification information:  PT Insurance Information: [facility]  Physician Information:  Referring Practitioner: Dr Mae France of care signed (Y/N):     Date of Patient follow up with Physician:       Progress Report: [x]  Yes  []  No     Date Range for reporting period:  Beginnin2021  Endin2021    Progress report due (10 Rx/or 30 days whichever is less): 05     Recertification due (POC duration/ or 90 days whichever is less): 6/15/2021     Visit # Insurance Allowable Auth Needed   15 Medicare/[facility] (med guidelines) []Yes   [x]No     Latex Allergy:  [x]NO      []YES  Preferred Language for Healthcare:   [x]English       []other:  Functional Scale: LEFS 22.5% disability Date assessed:2021    Pain level:  2-3/10     SUBJECTIVE:  Patient reports that his L knee isn't feeling too poorly. Having some tenderness lower on the inside of his knee from where he normally feels it.              OBJECTIVE:    Observation:    Test measurements:      RESTRICTIONS/PRECAUTIONS: none    Exercises/Interventions:     Therapeutic Ex (88046)   Min: 15 Sets/sec Reps Notes/CUES   Retro Stepper/BIKE 1 5    BOSU lunge- fwd and lateral 10 10 Cues form   SC  20    Wall sit- cues heels 5sec 20 80-90 degrees   Standing adductor stretch 30 4 left   Hamstring stretch 30 4 left         Hip Ext /table      BOSU fwd/side lunge      BOSU squat held     Leg Press Iso/Con/Ecc 0- 2 10 70 lb, 2 to 1   Cybex HS curl      TRX partial squat 1 15    Glute side walks 2 2 maroon   RDL      Slide Lunge 5 10    Slide HS eccentrics      Step ups/ecc step down 1 15 6 in- difficulty with eccentric control   Swissball wall rolls- in SLS- hip drive      Quad hip ext/wall-ball rolls                  Manual Intervention (48855)  Min: 16 min      Knee mobs/PROM 1 4 Rotational TF mobs, flexion mobs   Tib/Fem Mobs      Patella Mobs 1 2    L hip belt mobs 1 0    IASTM 1 10 Medial joint line, patellar tendon         NMR re-education (04669)  Min:   CUES NEEDED   Serbian/Biofeedback 10/10      BFR      G. Med activation      Hip Ext full ROM/ G. Activation      Bosu Bal and Prop- G Med      Single leg stance/Balance/Prop      Bosu Retro G. Med act      Prone Hip froggers- sliders/elevated            Therapeutic Activity (03394)  Min:      Ladders      Plyos      Dynamic Balance                    Therapeutic Exercise and NMR EXR  [x] (06220) Provided verbal/tactile cueing for activities related to strengthening, flexibility, endurance, ROM for improvements in LE, proximal hip, and core control with self care, mobility, lifting, ambulation. [x] (48788) Provided verbal/tactile cueing for activities related to improving balance, coordination, kinesthetic sense, posture, motor skill, proprioception  to assist with LE, proximal hip, and core control in self care, mobility, lifting, ambulation and eccentric single leg control.      NMR and Therapeutic Activities:    [x] (77019 or 86212) Provided verbal/tactile cueing for activities related to improving balance, coordination, kinesthetic sense, posture, motor skill, proprioception and motor activation to allow for proper function of core, proximal hip and LE with self care and ADLs and functional mobility.   [] (61135) Gait Re-education- Provided training and instruction to the patient for proper LE, core and proximal hip recruitment and positioning and eccentric body weight control with ambulation re-education including up and down stairs     Home Exercise Program:    [x] (80721) Reviewed/Progressed HEP activities related to strengthening, flexibility, endurance, ROM of core, proximal hip and LE for functional self-care, mobility, lifting and ambulation/stair navigation   [] (47370)Reviewed/Progressed HEP activities related to improving balance, coordination, kinesthetic sense, posture, motor skill, proprioception of core, proximal hip and LE for self care, mobility, lifting, and ambulation/stair navigation      Manual Treatments:  PROM / STM / Oscillations-Mobs:  G-I, II, III, IV (PA's, Inf., Post.)  [x] (29629) Provided manual therapy to mobilize LE, proximal hip and/or LS spine soft tissue/joints for the purpose of modulating pain, promoting relaxation,  increasing ROM, reducing/eliminating soft tissue swelling/inflammation/restriction, improving soft tissue extensibility and allowing for proper ROM for normal function with self care, mobility, lifting and ambulation. Modalities:     [] GAME READY (VASO)- for significant edema, swelling, pain control. Charges:  Timed Code Treatment Minutes: 31   Total Treatment Minutes: 32      [] EVAL (LOW) 64620 (typically 20 minutes face-to-face)  [] EVAL (MOD) 66040 (typically 30 minutes face-to-face)  [] EVAL (HIGH) 36221 (typically 45 minutes face-to-face)  [] RE-EVAL     [x] OT(63693) x  1 KX [] DRY NEEDLE 1 OR 2 MUSCLES  [] NMR (43174) x     [] DRY NEEDLE 3+ MUSCLES  [x] Manual (71202) x 1   KX   [] TA (92249) x     [] [facility] (42969[facility]  [] ES(attended) (28936)     [] ES (un) (29437):   [] VASO (31241)  [] Other:    If BW Please Indicate Time In/Out  CPT Code Time in Time out                                   GOALS:  Patient stated goal: reduce knee pain   [x] Progressing: [] Met: [] Not Met: [] Adjusted    Therapist goals for Patient:   Short Term Goals: To be achieved in: 2 weeks  1. Independent in HEP and progression per patient tolerance, in order to prevent re-injury.    [] Progressing: [x] Met: [] Not Met: [] Adjusted  2. Patient will have a decrease in pain to facilitate improvement in movement, function, and ADLs as indicated by Functional Deficits. [x] Progressing: [] Met: [] Not Met: [] Adjusted    Long Term Goals: To be achieved in: 6 weeks  1. Disability index score of 25% or less for the LEFS to assist with reaching prior level of function. [] Progressing: [x] Met: [] Not Met: [] Adjusted  2. Patient will demonstrate an increase in Strength to good proximal hip strength and control, within 5lb HHD in LE to allow for proper functional mobility as indicated by patients Functional Deficits. [x] Progressing: [] Met: [] Not Met: [] Adjusted  3. Patient will return to standing and sitting functional activities without increased symptoms or restriction. [] Progressing: [x] Met: [] Not Met: [] Adjusted  4. Patient will report being able to perform stairs without increased symptoms or restriction. (patient specific functional goal)    [x] Progressing: [] Met: [] Not Met: [] Adjusted    ASSESSMENT: Good tolerance to treatment and manual therapy with improvement in overall mobility and restriction in TF joint. Reduction in medial knee pain at conclusion. Continues to progress in regards to strength in knee. Challenged appropriately with exercises. Added more lateral strengthening today. Patient appropriately fatigued at conclusion.       Return to Play: (if applicable)   []  Stage 1: Intro to Strength   []  Stage 2: Return to Run and Strength   []  Stage 3: Return to Jump and Strength   []  Stage 4: Dynamic Strength and Agility   []  Stage 5: Sport Specific Training     []  Ready to Return to Play, Meets All Above Stages   []  Not Ready for Return to Sports   Comments:            Treatment/Activity Tolerance:  [x] Patient tolerated treatment well [] Patient limited by fatique  [] Patient limited by pain  [] Patient limited by other medical complications  [] Other:     Overall Progression Towards Functional goals/ Treatment Progress Update:  [x] Patient is progressing as expected towards functional goals listed. [] Progression is slowed due to complexities/Impairments listed. [] Progression has been slowed due to co-morbidities. [] Plan just implemented, too soon to assess goals progression <30days   [] Goals require adjustment due to lack of progress  [] Patient is not progressing as expected and requires additional follow up with physician  [] Other    Prognosis for POC: [x] Good [] Fair  [] Poor    Patient requires continued skilled intervention: [x] Yes  [] No        PLAN: See eval  [x] Continue per plan of care [] Alter current plan (see comments)  [] Plan of care initiated [] Hold pending MD visit [] Discharge    Electronically signed by: Elsa Monte PT    Note: If patient does not return for scheduled/recommended follow up visits, this note will serve as a discharge from care along with the most recent update on progress.

## 2021-08-23 ENCOUNTER — HOSPITAL ENCOUNTER (OUTPATIENT)
Dept: PHYSICAL THERAPY | Age: 66
Setting detail: THERAPIES SERIES
Discharge: HOME | End: 2021-08-23
Payer: MEDICARE

## 2021-08-23 PROCEDURE — 97140 MANUAL THERAPY 1/> REGIONS: CPT

## 2021-08-23 PROCEDURE — 97110 THERAPEUTIC EXERCISES: CPT

## 2021-08-23 NOTE — FLOWSHEET NOTE
[place] [address] [address]  Phone: [phone] Fax: [phone]       Physical Therapy Treatment Note/ Progress Report:     Date:  2021    Patient Name:  Bo Back    :  1955  MRN: [id number]  Restrictions/Precautions:    Medical/Treatment Diagnosis Information:  Diagnosis: medial meniscus tear, L knee pain  Treatment Diagnosis: L knee pain L22.004  Insurance/Certification information:  PT Insurance Information: [facility]  Physician Information:  Referring Practitioner: Dr Marta Junior of care signed (Y/N):     Date of Patient follow up with Physician:       Progress Report: [x]  Yes  []  No     Date Range for reporting period:  Beginnin2021  Endin2021    Progress report due (10 Rx/or 30 days whichever is less):      Recertification due (POC duration/ or 90 days whichever is less): 6/15/2021     Visit # Insurance Allowable Auth Needed   16 Medicare/[employer] (med guidelines) []Yes   [x]No     Latex Allergy:  [x]NO      []YES  Preferred Language for Healthcare:   [x]English       []other:  Functional Scale: LEFS 22.5% disability Date assessed:2021    Pain level:  2-3/10     SUBJECTIVE:  Patient reports that his L knee is feeling pretty good and has been for the last few days. Hasn't been doing pool exercises for the past few days either because of heat. Was really sore for about 2 days after last session. Stopped using CBD drops as feels this wasn't helping anything and knee felt just as good without as with.               OBJECTIVE:    Observation:    Test measurements:      RESTRICTIONS/PRECAUTIONS: none    Exercises/Interventions:     Therapeutic Ex (84494)   Min: 15 Sets/sec Reps Notes/CUES   Retro Stepper/BIKE 1 5    BOSU lunge- fwd and lateral 10 10 Cues form   SC     Wall sit- cues heels 5sec 20 80-90 degrees   Standing adductor stretch 30 4 left   Hamstring stretch 30 4 left         Hip Ext [place]      BOSU fwd/side lunge      BOSU squat held     Leg Press Iso/Con/Ecc 0- 2 10 70 lb, 2 to 1   Cybex HS curl      TRX partial squat 1 15    Glute side walks 2 2 maroon   RDL      Slide Lunge 5 10    Slide HS eccentrics      Step ups/ecc step down 1 15 6 in- difficulty with eccentric control   Swissball wall rolls- in SLS- hip drive      Quad hip ext/wall-ball rolls                  Manual Intervention (70647)  Min: 16 min      Knee mobs/PROM 1 4 Rotational TF mobs, flexion mobs   Tib/Fem Mobs      Patella Mobs 1 2    L hip belt mobs 1 0    IASTM 1 10 Medial joint line, patellar tendon         NMR re-education (70607)  Min:   CUES NEEDED   [place]est[place]/Biofeedback 10/10      BFR      G. Med activation      Hip Ext full ROM/ G. Activation      Bosu Bal and Prop- G Med      Single leg stance/Balance/Prop      Bosu Retro G. Med act      Prone Hip froggers- sliders/elevated            Therapeutic Activity (89893)  Min:      Ladders      [place]      Dynamic Balance                    Therapeutic Exercise and NMR EXR  [x] (81742) Provided verbal/tactile cueing for activities related to strengthening, flexibility, endurance, ROM for improvements in LE, proximal hip, and core control with self care, mobility, lifting, ambulation. [x] (65456) Provided verbal/tactile cueing for activities related to improving balance, coordination, kinesthetic sense, posture, motor skill, proprioception  to assist with LE, proximal hip, and core control in self care, mobility, lifting, ambulation and eccentric single leg control.      NMR and Therapeutic Activities:    [x] (42364 or 32063) Provided verbal/tactile cueing for activities related to improving balance, coordination, kinesthetic sense, posture, motor skill, proprioception and motor activation to allow for proper function of core, proximal hip and LE with self care and ADLs and functional mobility.   [] (04172) Gait Re-education- Provided training and instruction to the patient for proper LE, core and proximal hip recruitment and positioning and eccentric body weight control with ambulation re-education including up and down stairs     Home Exercise Program:    [x] (21844) Reviewed/Progressed HEP activities related to strengthening, flexibility, endurance, ROM of core, proximal hip and LE for functional self-care, mobility, lifting and ambulation/stair navigation   [] (71169)Reviewed/Progressed HEP activities related to improving balance, coordination, kinesthetic sense, posture, motor skill, proprioception of core, proximal hip and LE for self care, mobility, lifting, and ambulation/stair navigation      Manual Treatments:  PROM / STM / Oscillations-Mobs:  G-I, II, III, IV (PA's, Inf., Post.)  [x] (47436) Provided manual therapy to mobilize LE, proximal hip and/or LS spine soft tissue/joints for the purpose of modulating pain, promoting relaxation,  increasing ROM, reducing/eliminating soft tissue swelling/inflammation/restriction, improving soft tissue extensibility and allowing for proper ROM for normal function with self care, mobility, lifting and ambulation. Modalities:     [] GAME READY (VASO)- for significant edema, swelling, pain control.      Charges:  Timed Code Treatment Minutes: 31   Total Treatment Minutes: 32      [] EVAL (LOW) 28121 (typically 20 minutes face-to-face)  [] EVAL (MOD) 53188 (typically 30 minutes face-to-face)  [] EVAL (HIGH) 86600 (typically 45 minutes face-to-face)  [] RE-EVAL     [x] EG(22943) x  1 KX [] DRY NEEDLE 1 OR 2 MUSCLES  [] NMR (04385) x     [] DRY NEEDLE 3+ MUSCLES  [x] Manual (59881) x 1   KX   [] TA (60559) x     [] Mech Traction (88887)  [] ES(attended) (55907)     [] ES (un) (95906):   [] VASO (39875)  [] Other:    If BWC Please Indicate Time In/Out  CPT Code Time in Time out                                   GOALS:  Patient stated goal: reduce knee pain   [x] Progressing: [] Met: [] Not Met: [] Adjusted    Therapist goals for Patient:   Short Term Goals: To be achieved in: 2 weeks  1. Independent in HEP and progression per patient tolerance, in order to prevent re-injury. [] Progressing: [x] Met: [] Not Met: [] Adjusted  2. Patient will have a decrease in pain to facilitate improvement in movement, function, and ADLs as indicated by Functional Deficits. [x] Progressing: [] Met: [] Not Met: [] Adjusted    Long Term Goals: To be achieved in: 6 weeks  1. Disability index score of 25% or less for the LEFS to assist with reaching prior level of function. [] Progressing: [x] Met: [] Not Met: [] Adjusted  2. Patient will demonstrate an increase in Strength to good proximal hip strength and control, within 5lb HHD in LE to allow for proper functional mobility as indicated by patients Functional Deficits. [x] Progressing: [] Met: [] Not Met: [] Adjusted  3. Patient will return to standing and sitting functional activities without increased symptoms or restriction. [] Progressing: [x] Met: [] Not Met: [] Adjusted  4. Patient will report being able to perform stairs without increased symptoms or restriction. (patient specific functional goal)    [x] Progressing: [] Met: [] Not Met: [] Adjusted    ASSESSMENT: Patient with increased soft tissue restriction in medial aspect of L knee joint line. Tender at hamstring insertion and pes anserine. Good tolerance to treatment and manual therapy with improvement in overall mobility and restriction in TF joint. Mild soreness reported with exercises today. No pain or soreness with ambulation at conclusion. Patient appropriately fatigued at conclusion.       Return to Play: (if applicable)   []  Stage 1: Intro to Strength   []  Stage 2: Return to Run and Strength   []  Stage 3: Return to Jump and Strength   []  Stage 4: Dynamic Strength and Agility   []  Stage 5: Sport Specific Training     []  Ready to Return to Play, Meets All Above Stages   []  Not Ready for Return to Sports   Comments:            Treatment/Activity Tolerance:  [x] Patient tolerated treatment well [] Patient limited by fatique  [] Patient limited by pain  [] Patient limited by other medical complications  [] Other:     Overall Progression Towards Functional goals/ Treatment Progress Update:  [x] Patient is progressing as expected towards functional goals listed. [] Progression is slowed due to complexities/Impairments listed. [] Progression has been slowed due to co-morbidities. [] Plan just implemented, too soon to assess goals progression <30days   [] Goals require adjustment due to lack of progress  [] Patient is not progressing as expected and requires additional follow up with physician  [] Other    Prognosis for POC: [x] Good [] Fair  [] Poor    Patient requires continued skilled intervention: [x] Yes  [] No        PLAN: See eval  [x] Continue per plan of care [] Alter current plan (see comments)  [] Plan of care initiated [] Hold pending MD visit [] Discharge    Electronically signed by: Desiree Anderson PT    Note: If patient does not return for scheduled/recommended follow up visits, this note will serve as a discharge from care along with the most recent update on progress.

## 2021-08-30 ENCOUNTER — OFFICE VISIT (OUTPATIENT)
Dept: ORTHOPEDIC SURGERY | Age: 66
End: 2021-08-30
Payer: MEDICARE

## 2021-08-30 VITALS — HEIGHT: 62 IN | WEIGHT: 147 LBS | BODY MASS INDEX: 27.05 KG/M2

## 2021-08-30 DIAGNOSIS — M25.562 LEFT KNEE PAIN, UNSPECIFIED CHRONICITY: Primary | ICD-10-CM

## 2021-08-30 PROCEDURE — 1036F TOBACCO NON-USER: CPT | Performed by: ORTHOPAEDIC SURGERY

## 2021-08-30 PROCEDURE — 20610 DRAIN/INJ JOINT/BURSA W/O US: CPT | Performed by: ORTHOPAEDIC SURGERY

## 2021-08-30 PROCEDURE — G8427 DOCREV CUR MEDS BY ELIG CLIN: HCPCS | Performed by: ORTHOPAEDIC SURGERY

## 2021-08-30 PROCEDURE — 3017F COLORECTAL CA SCREEN DOC REV: CPT | Performed by: ORTHOPAEDIC SURGERY

## 2021-08-30 PROCEDURE — 4040F PNEUMOC VAC/ADMIN/RCVD: CPT | Performed by: ORTHOPAEDIC SURGERY

## 2021-08-30 PROCEDURE — 99214 OFFICE O/P EST MOD 30 MIN: CPT | Performed by: ORTHOPAEDIC SURGERY

## 2021-08-30 PROCEDURE — G8417 CALC BMI ABV UP PARAM F/U: HCPCS | Performed by: ORTHOPAEDIC SURGERY

## 2021-08-30 PROCEDURE — 1123F ACP DISCUSS/DSCN MKR DOCD: CPT | Performed by: ORTHOPAEDIC SURGERY

## 2021-08-30 RX ORDER — BUPIVACAINE HYDROCHLORIDE 5 MG/ML
4 INJECTION, SOLUTION PERINEURAL ONCE
Status: COMPLETED | OUTPATIENT
Start: 2021-08-30 | End: 2021-08-30

## 2021-08-30 RX ORDER — METHYLPREDNISOLONE ACETATE 40 MG/ML
40 INJECTION, SUSPENSION INTRA-ARTICULAR; INTRALESIONAL; INTRAMUSCULAR; SOFT TISSUE ONCE
Status: COMPLETED | OUTPATIENT
Start: 2021-08-30 | End: 2021-08-30

## 2021-08-30 RX ADMIN — METHYLPREDNISOLONE ACETATE 40 MG: 40 INJECTION, SUSPENSION INTRA-ARTICULAR; INTRALESIONAL; INTRAMUSCULAR; SOFT TISSUE at 10:45

## 2021-08-30 RX ADMIN — BUPIVACAINE HYDROCHLORIDE 20 MG: 5 INJECTION, SOLUTION PERINEURAL at 10:45

## 2021-08-30 NOTE — PROGRESS NOTES
8/30/2021     Reason for visit:  Left knee pain    History of Present Illness: The patient is a 40-year-old male who presents for evaluation of his left knee. He reports on and off left knee pain. No traumatic injury. He localized the pain to the anterior as well as the medial aspect of the knee. The pain is made worse with standing for prolonged periods of time, walking, stairs. No catching or locking. No instability. Of note, according to him he underwent a knee arthroscopy back in 2005. The patient returns for follow-up evaluation of his knee. He has been getting physical therapy and does report that it has not really been helping. His knee pain is worsening. He has pain daily. Objective:  Ht 5' 2\" (1.575 m)   Wt 147 lb (66.7 kg)   BMI 26.89 kg/m²      Physical Exam:  The patient is well-appearing and in no apparent distress  Examination of the left knee   There is no effusion, no gross deformity or skin changes  Range of motion reveals 0 to 130  neg lachman, negative posterior drawer, no pain or laxity with varus or valgus stress at 0 degrees and 30 degrees of flexion  Mild medial joint line tenderness  5 out of 5 strength throughout distal muscle groups  Sensation is intact to light touch throughout all distributions  There is no calf swelling or tenderness  Palpable DP pulse, brisk cap refill, 2+ symmetric reflexes    Imaging:  X-rays of the left knee were reviewed. There is no fracture, dislocation, or other abnormality. MRI of the left knee was reviewed. Signal abnormality within the posterior horn of the medial meniscus that likely represents degeneration. No discrete tear noted. Early chondral thinning of the patellofemoral medial compartments. Otherwise unremarkable MRI    Assessment:  Left knee pain. Suspect likely early degenerative joint disease    Plan:  I discussed with the patient the diagnosis and treatment options. We discussed operative and nonoperative management. At this point I do recommend nonoperative management. Nonoperative treatment options include activity modification, anti-inflammatory medications, physical therapy, and injections. The patient elected proceed with cortisone injection. Therefore injection was given to the left knee via sterile technique. The injection consisted of 40 mg of Depo-Medrol combined with 4 mL of 0.5% Marcaine. The patient tolerated this well. Patient will return as needed. In future we could repeat cortisone or proceed with hyaluronic acid. Greater than 30 minutes were spent with this encounter. Time spent included evaluating the patient's chart prior to arrival.  Evaluating the patient in the office including history, physical examination, imaging reviewing, and counseling on next steps. Lastly, time was spent discussing orders with my staff as well as providing documentation in the chart. Dior Fang MD            Orthopaedic Surgery Sports Medicine and [address] [facility] and [address] [facility] Physician [facility] ([place])      Disclaimer: This note was dictated with voice recognition software. Though review and correction are routine, we apologize for any errors.

## 2021-10-05 ENCOUNTER — PATIENT MESSAGE (OUTPATIENT)
Dept: ORTHOPEDIC SURGERY | Age: 66
End: 2021-10-05
Payer: MEDICARE

## 2021-10-05 DIAGNOSIS — M25.562 LEFT KNEE PAIN, UNSPECIFIED CHRONICITY: Primary | ICD-10-CM

## 2021-10-05 PROCEDURE — L1812 KO ELASTIC W/JOINTS PRE OTS: HCPCS | Performed by: ORTHOPAEDIC SURGERY

## 2021-10-05 NOTE — TELEPHONE ENCOUNTER
From: Mckenna Faustin  To: Heaven Angulo MD  Sent: 10/5/2021 2:43 PM EDT  Subject: Visit Follow-Up Question    The injection you gave me had immediate relief and continues to make life easier. I am not going back to PT because I really do think that it was causing more harm than good. The only real limitation I have is walking distances. I wear an old brace (can't believe it's still good after 14 years) that I got from my orthopedic surgeon after tearing my meniscus the first time. Do you stock any braces in the [facility] office? I will be there tomorrow afternoon taking my wife to PT. I think a new brace is past due. I wear it doing yard work and when walking or driving distance. If you don't, I can come to the [place]. Thanks.  Johnnie Fuentes [phone]

## 2021-11-01 ENCOUNTER — TELEPHONE (OUTPATIENT)
Dept: ORTHOPEDIC SURGERY | Age: 66
End: 2021-11-01

## 2021-11-01 ENCOUNTER — OFFICE VISIT (OUTPATIENT)
Dept: ORTHOPEDIC SURGERY | Age: 66
End: 2021-11-01
Payer: MEDICARE

## 2021-11-01 VITALS — BODY MASS INDEX: 27.05 KG/M2 | HEIGHT: 62 IN | WEIGHT: 147 LBS

## 2021-11-01 DIAGNOSIS — M25.562 LEFT KNEE PAIN, UNSPECIFIED CHRONICITY: Primary | ICD-10-CM

## 2021-11-01 PROCEDURE — 4040F PNEUMOC VAC/ADMIN/RCVD: CPT | Performed by: ORTHOPAEDIC SURGERY

## 2021-11-01 PROCEDURE — 1123F ACP DISCUSS/DSCN MKR DOCD: CPT | Performed by: ORTHOPAEDIC SURGERY

## 2021-11-01 PROCEDURE — G8484 FLU IMMUNIZE NO ADMIN: HCPCS | Performed by: ORTHOPAEDIC SURGERY

## 2021-11-01 PROCEDURE — G8417 CALC BMI ABV UP PARAM F/U: HCPCS | Performed by: ORTHOPAEDIC SURGERY

## 2021-11-01 PROCEDURE — 99213 OFFICE O/P EST LOW 20 MIN: CPT | Performed by: ORTHOPAEDIC SURGERY

## 2021-11-01 PROCEDURE — 1036F TOBACCO NON-USER: CPT | Performed by: ORTHOPAEDIC SURGERY

## 2021-11-01 PROCEDURE — G8427 DOCREV CUR MEDS BY ELIG CLIN: HCPCS | Performed by: ORTHOPAEDIC SURGERY

## 2021-11-01 PROCEDURE — 20610 DRAIN/INJ JOINT/BURSA W/O US: CPT | Performed by: ORTHOPAEDIC SURGERY

## 2021-11-01 PROCEDURE — 3017F COLORECTAL CA SCREEN DOC REV: CPT | Performed by: ORTHOPAEDIC SURGERY

## 2021-11-01 RX ORDER — METHYLPREDNISOLONE ACETATE 40 MG/ML
40 INJECTION, SUSPENSION INTRA-ARTICULAR; INTRALESIONAL; INTRAMUSCULAR; SOFT TISSUE ONCE
Status: COMPLETED | OUTPATIENT
Start: 2021-11-01 | End: 2021-11-01

## 2021-11-01 RX ORDER — BUPIVACAINE HYDROCHLORIDE 5 MG/ML
1 INJECTION, SOLUTION PERINEURAL ONCE
Status: COMPLETED | OUTPATIENT
Start: 2021-11-01 | End: 2021-11-01

## 2021-11-01 RX ADMIN — METHYLPREDNISOLONE ACETATE 40 MG: 40 INJECTION, SUSPENSION INTRA-ARTICULAR; INTRALESIONAL; INTRAMUSCULAR; SOFT TISSUE at 11:13

## 2021-11-01 RX ADMIN — BUPIVACAINE HYDROCHLORIDE 5 MG: 5 INJECTION, SOLUTION PERINEURAL at 11:09

## 2021-11-01 NOTE — TELEPHONE ENCOUNTER
General Question     Subject: PATIENT IS CALLING STATING HE HAD AN INJECTION IN HIS LEFT KNEE THIS MORNING. HE STATES HE IS EXPERIENCING A LOT OF PAIN TO THE POINT OF CAUSING NAUSEA.   Patient:  Payal Stephens \"Sebastián\"  Contact Number: [phone]

## 2021-11-02 NOTE — PROGRESS NOTES
11/1/2021     Reason for visit:  Left knee pain    History of Present Illness: The patient is a 58-year-old male who presents for evaluation of his left knee. He reports on and off left knee pain. No traumatic injury. He localized the pain to the anterior as well as the medial aspect of the knee. The pain is made worse with standing for prolonged periods of time, walking, stairs. No catching or locking. No instability. Of note, according to him he underwent a knee arthroscopy back in 2005. The last visit a cortisone injection was given to the knee which did provide relief. However he has been wearing a brace and now reports some pain within the anterior spike to the knee as result the brace. Objective:  Ht 5' 2\" (1.575 m)   Wt 147 lb (66.7 kg)   BMI 26.89 kg/m²      Physical Exam:  The patient is well-appearing and in no apparent distress  Examination of the left knee   There is no effusion, no gross deformity or skin changes  Range of motion reveals 0 to 130  neg lachman, negative posterior drawer, no pain or laxity with varus or valgus stress at 0 degrees and 30 degrees of flexion  Mild medial joint line tenderness  Tenderness over the anterior medial tibia at the hamstring insertion  5 out of 5 strength throughout distal muscle groups  Sensation is intact to light touch throughout all distributions  There is no calf swelling or tenderness  Palpable DP pulse, brisk cap refill, 2+ symmetric reflexes    Imaging:  X-rays of the left knee were reviewed. There is no fracture, dislocation, or other abnormality. MRI of the left knee was reviewed. Signal abnormality within the posterior horn of the medial meniscus that likely represents degeneration. No discrete tear noted. Early chondral thinning of the patellofemoral medial compartments. Otherwise unremarkable MRI    Assessment:  Left knee pain.   Suspect likely early degenerative joint disease with new symptomatic hamstring insertional tendinitis/ pes bursitis    Plan:  I discussed with the patient the diagnosis and treatment options. We discussed operative and nonoperative management. At this point I do recommend nonoperative management. Nonoperative treatment options include activity modification, anti-inflammatory medications, physical therapy, and injections. The patient elected proceed with cortisone injection. Therefore injection was given to the left knee pes bursa via sterile technique. The injection consisted of 40 mg of Depo-Medrol combined with 1 mL of 0.5% Marcaine. The patient tolerated this well. Patient will return as needed. Greater than 20 minutes were spent with this encounter. Time spent included evaluating the patient's chart prior to arrival.  Evaluating the patient in the office including history, physical examination, imaging reviewing, and counseling on next steps. Lastly, time was spent discussing orders with my staff as well as providing documentation in the chart. Torito Robins MD            Orthopaedic Surgery Sports Medicine and [facility] [address] and [place]            Team Physician [facility] ([place])      Disclaimer: This note was dictated with voice recognition software. Though review and correction are routine, we apologize for any errors.

## 2021-11-02 NOTE — TELEPHONE ENCOUNTER
Called patient in regards to his call to the call center. He states he was told by the call center that an on call MD would be returning his call immediately yesterday. He is upset this did not occur as he expected a MD to call him last night. I explained to the patient that nausea and a migraine was not typical symptoms of a cortisone injection and that Dr. Juwan Harris was alerted and did not feel that his symptoms were related to the injection he was given yesterday. Pt stated he had nothing further to say to me and hung up the phone.

## 2021-11-09 ENCOUNTER — APPOINTMENT (OUTPATIENT)
Dept: PHYSICAL THERAPY | Age: 66
End: 2021-11-09
Payer: MEDICARE

## 2021-11-17 ENCOUNTER — HOSPITAL ENCOUNTER (OUTPATIENT)
Dept: PHYSICAL THERAPY | Age: 66
Setting detail: THERAPIES SERIES
Discharge: HOME | End: 2021-11-17
Payer: MEDICARE

## 2021-11-17 PROCEDURE — 97164 PT RE-EVAL EST PLAN CARE: CPT

## 2021-11-17 PROCEDURE — 97140 MANUAL THERAPY 1/> REGIONS: CPT

## 2021-11-17 NOTE — PLAN OF CARE
[facility] [address]  Phone: [phone] Fax: [phone]        Physical Therapy Re-Certification Plan of Care/MD UPDATE      Dear Referring Practitioner: Dr Gt Vazquez,    We had the pleasure of treating the following patient for physical therapy services at [address] [place] [address]. A summary of our findings can be found in the updated assessment below. This includes our plan of care. If you have any questions or concerns regarding these findings, please do not hesitate to contact me at the office phone number checked above. Thank you for the referral.     Physician Signature:________________________________Date:__________________  By signing above (or electronic signature), therapists plan is approved by physician    Date Range Of Visits: 2021  Total Visits to Date: 1 (12 prior in  for L knee and back)  Overall Response to Treatment:   []Patient is responding well to treatment and improvement is noted with regards  to goals   []Patient should continue to improve in reasonable time if they continue HEP   []Patient has plateaued and is no longer responding to skilled PT intervention    []Patient is getting worse and would benefit from return to referring MD   []Patient unable to adhere to initial POC   [x]Other: Patient returns to therapy for L knee pain. Had been managing well until recent flare up.       Physical Therapy Treatment Note/ Progress Report:     Date:  2021    Patient Name:  Gianni Guardado  \"CORNELIA\"  :  1955  MRN: [id number]  Restrictions/Precautions:    Medical/Treatment Diagnosis Information:  Diagnosis: medial meniscus tear, L knee pain  Treatment Diagnosis: L knee pain O15.874  Insurance/Certification information:  PT Insurance Information: [facility]  Physician Information:  Referring Practitioner: Dr Luevenia Crigler of care signed (Y/N):     Date of Patient follow up with Physician: not known      Progress Report: [x]  Yes  []  No     Date Range for reporting period:  Beginnin2021  Ending:  -    Progress report due (10 Rx/or 30 days whichever is less):      Recertification due (POC duration/ or 90 days whichever is less): 2021    Visit # Insurance Allowable Auth Needed   1 (16 prior in year) [facility] (med guidelines) []Yes   [x]No     Latex Allergy:  [x]NO      []YES  Preferred Language for Healthcare:   [x]English       []other:  Functional Scale: LEFS 22.5% disability Date assessed:2021    Pain level:  2-310     SUBJECTIVE:  Patient reports that his L knee started bothering him a little more on 10/29/2021. States that knee started throbbing. States by the end of the day his toes started hurting as well as he thought he had flare up of gout. Did not feel that it was gout when he went to ER. Went to see Dr Cammie Montana and feels it was bursa. Had injection on . Dr wanted him to do PT. Unsure if injection helped any. Stopped using knee brace and feels like this has helped more than anything. Feels most limited in sitting for long periods of time, standing in one place.               OBJECTIVE:    Observation:    Test measurements:           ROM LEFT RIGHT   HIP Flex       HIP Abd       HIP Ext       HIP IR 5  25    HIP ER  WFL WNL   Knee ext 0 0   Knee Flex 128 130   Strength  LEFT RIGHT   HIP Flexors 21.5 26.7   HIP Abductors 25.9 27.9   HIP Ext 27.5 22.2   Hip ER       Knee EXT (quad) 33.6 36.7   Knee Flex (HS) 32.7 28.6         RESTRICTIONS/PRECAUTIONS: none    Exercises/Interventions:     Therapeutic Ex (33869)   Min: 00 Sets/sec Reps Notes/CUES   Retro Stepper/BIKE 1 5    BOSU lunge- fwd and lateral 10 10 Cues form   SC     Wall sit- cues heels 5sec 20 80-90 degrees   Standing adductor stretch 30 4 left   Hamstring stretch 30 4 left         Hip Ext /table      BOSU fwd/side lunge      BOSU squat held     Leg Press Iso/Con/Ecc 0- 2 10 70 lb, 2 to 1   Cybex HS curl      TRX partial squat 1 15    Glute side walks 2 2 maroon   RDL      Slide Lunge 5 10    Slide HS eccentrics      Step ups/ecc step down 1 15 6 in- difficulty with eccentric control   Swissball wall rolls- in SLS- hip drive      Quad hip ext/wall-ball rolls                  Manual Intervention (92955)  Min: 18 min      Knee mobs/PROM 1 0 Rotational TF mobs, flexion mobs   Tib/Fem Mobs      Patella Mobs 1 2    L hip belt mobs 1 6    IASTM 1 10 Medial joint line, patellar tendon         NMR re-education (41674)  Min:   CUES NEEDED   Pakistani/Biofeedback 10/10      BFR      G. Med activation      Hip Ext full ROM/ G. Activation      Bosu Bal and Prop- G Med      Single leg stance/Balance/Prop      Bosu Retro G. Med act      Prone Hip froggers- sliders/elevated            Therapeutic Activity (56300)  Min:      Ladders      Plyos      Dynamic Balance                    Therapeutic Exercise and NMR EXR  [x] (89040) Provided verbal/tactile cueing for activities related to strengthening, flexibility, endurance, ROM for improvements in LE, proximal hip, and core control with self care, mobility, lifting, ambulation. [x] (02183) Provided verbal/tactile cueing for activities related to improving balance, coordination, kinesthetic sense, posture, motor skill, proprioception  to assist with LE, proximal hip, and core control in self care, mobility, lifting, ambulation and eccentric single leg control.      NMR and Therapeutic Activities:    [x] (42483 or 89083) Provided verbal/tactile cueing for activities related to improving balance, coordination, kinesthetic sense, posture, motor skill, proprioception and motor activation to allow for proper function of core, proximal hip and LE with self care and ADLs and functional mobility.   [] (84218) Gait Re-education- Provided training and instruction to the patient for proper LE, core and proximal hip recruitment and positioning and eccentric body weight control with ambulation re-education including up and down stairs     Home Exercise Program:    [x] (32240) Reviewed/Progressed HEP activities related to strengthening, flexibility, endurance, ROM of core, proximal hip and LE for functional self-care, mobility, lifting and ambulation/stair navigation   [] (80550)Reviewed/Progressed HEP activities related to improving balance, coordination, kinesthetic sense, posture, motor skill, proprioception of core, proximal hip and LE for self care, mobility, lifting, and ambulation/stair navigation      Manual Treatments:  PROM / STM / Oscillations-Mobs:  G-I, II, III, IV (PA's, Inf., Post.)  [x] (31636) Provided manual therapy to mobilize LE, proximal hip and/or LS spine soft tissue/joints for the purpose of modulating pain, promoting relaxation,  increasing ROM, reducing/eliminating soft tissue swelling/inflammation/restriction, improving soft tissue extensibility and allowing for proper ROM for normal function with self care, mobility, lifting and ambulation. Modalities:     [] GAME READY (VASO)- for significant edema, swelling, pain control. Charges:  Timed Code Treatment Minutes: 18   Total Treatment Minutes: 40      [] EVAL (LOW) 42209 (typically 20 minutes face-to-face)  [] EVAL (MOD) 52787 (typically 30 minutes face-to-face)  [] EVAL (HIGH) 65402 (typically 45 minutes face-to-face)  [x] RE-EVAL     [] NT(34591) x  0 KX [] DRY NEEDLE 1 OR 2 MUSCLES  [] NMR (20336) x     [] DRY NEEDLE 3+ MUSCLES  [x] Manual (57782) x 1   KX   [] TA (54971) x     [] [facility] Traction (58919)  [] ES(attended) (48683)     [] ES (un) (16466):   [] VASO (36291)  [] Other:    If [facility] Please Indicate Time In/Out  CPT Code Time in Time out                                   GOALS:  Patient stated goal: reduce knee pain   [x] Progressing: [] Met: [] Not Met: [] Adjusted    Therapist goals for Patient:   Short Term Goals: To be achieved in: 2 weeks  1.  Independent in HEP and progression per patient tolerance, in order to prevent re-injury. [] Progressing: [] Met: [] Not Met: [] Adjusted  2. Patient will have a decrease in pain to facilitate improvement in movement, function, and ADLs as indicated by Functional Deficits. [] Progressing: [] Met: [] Not Met: [] Adjusted    Long Term Goals: To be achieved in: 6 weeks  1. Disability index score of 25% or less for the LEFS to assist with reaching prior level of function. [] Progressing: [] Met: [] Not Met: [] Adjusted  2. Patient will demonstrate an increase in Strength to good proximal hip strength and control, within 5lb HHD in LE to allow for proper functional mobility as indicated by patients Functional Deficits. [] Progressing: [] Met: [] Not Met: [] Adjusted  3. Patient will return to standing and sitting functional activities without increased symptoms or restriction. [] Progressing: [] Met: [] Not Met: [] Adjusted  4. Patient will report being able to perform stairs without increased symptoms or restriction. (patient specific functional goal)    [] Progressing: [] Met: [] Not Met: [] Adjusted    ASSESSMENT: Patient returns to therapy with new onset of chronic L knee pain. Upon assessment, patient with good ROM of L knee; however is very limited in L hip ROM. With increased soft tissue restriction in medial joint line as well as decreased quad strength and TF rotational mobility. Patient will benefit from further skilled PT services to address noted deficits. Patient reporting feeling improved at conclusion.         Return to Play: (if applicable)   []  Stage 1: Intro to Strength   []  Stage 2: Return to Run and Strength   []  Stage 3: Return to Jump and Strength   []  Stage 4: Dynamic Strength and Agility   []  Stage 5: Sport Specific Training     []  Ready to Return to Play, Meets All Above Stages   []  Not Ready for Return to Sports   Comments:            Treatment/Activity Tolerance:  [x] Patient tolerated treatment well [] Patient limited by fatique  [] Patient limited by pain  [] Patient limited by other medical complications  [] Other:     Overall Progression Towards Functional goals/ Treatment Progress Update:  [] Patient is progressing as expected towards functional goals listed. [] Progression is slowed due to complexities/Impairments listed. [] Progression has been slowed due to co-morbidities. [x] Plan just implemented, too soon to assess goals progression <30days   [] Goals require adjustment due to lack of progress  [] Patient is not progressing as expected and requires additional follow up with physician  [] Other    Prognosis for POC: [x] Good [] Fair  [] Poor    Patient requires continued skilled intervention: [x] Yes  [] No        PLAN: See eval  [] Continue per plan of care [] Alter current plan (see comments)  [x] Plan of care initiated [] Hold pending MD visit [] Discharge    Electronically signed by: Williams Pacheco PT    Note: If patient does not return for scheduled/recommended follow up visits, this note will serve as a discharge from care along with the most recent update on progress.

## 2021-11-23 ENCOUNTER — HOSPITAL ENCOUNTER (OUTPATIENT)
Dept: PHYSICAL THERAPY | Age: 66
Setting detail: THERAPIES SERIES
Discharge: HOME | End: 2021-11-23
Payer: MEDICARE

## 2021-11-23 PROCEDURE — 97110 THERAPEUTIC EXERCISES: CPT

## 2021-11-23 PROCEDURE — 97140 MANUAL THERAPY 1/> REGIONS: CPT

## 2021-11-30 ENCOUNTER — HOSPITAL ENCOUNTER (OUTPATIENT)
Dept: PHYSICAL THERAPY | Age: 66
Setting detail: THERAPIES SERIES
Discharge: HOME | End: 2021-11-30
Payer: MEDICARE

## 2021-11-30 PROCEDURE — 97110 THERAPEUTIC EXERCISES: CPT

## 2021-11-30 PROCEDURE — 97140 MANUAL THERAPY 1/> REGIONS: CPT

## 2021-11-30 NOTE — FLOWSHEET NOTE
[facility] [address] [address]  Phone: [phone] Fax: [phone]             Physical Therapy Treatment Note/ Progress Report:     Date:  2021    Patient Name:  Jean-Pierre Lackey  \"CORNELIA\"  :  1955  MRN: [id number]  Restrictions/Precautions:    Medical/Treatment Diagnosis Information:  Diagnosis: medial meniscus tear, L knee pain  Treatment Diagnosis: L knee pain N65.959  Insurance/Certification information:  PT Insurance Information: [facility]  Physician Information:  Referring Practitioner: Dr Valerie Carrillo of care signed (Y/N):     Date of Patient follow up with Physician: not known      Progress Report: []  Yes  [x]  No     Date Range for reporting period:  Beginnin2021  Ending:  -    Progress report due (10 Rx/or 30 days whichever is less):      Recertification due (POC duration/ or 90 days whichever is less): 2021    Visit # Insurance Allowable Auth Needed   3 (16 prior in year) [facility] (med guidelines) []Yes   [x]No     Latex Allergy:  [x]NO      []YES  Preferred Language for Healthcare:   [x]English       []other:  Functional Scale: LEFS 22.5% disability Date assessed:2021    Pain level:  1-2/10     SUBJECTIVE:  Patient reports that he had his brace on all day yesterday and did not have pain. He took both dogs for a walk and had the brace on all day until bedtime. Pt put his medicated creams on the knee and waited awhile to put the brace on so that the cream could dry. Pt did have a little knee soreness today until he put the brace on. Feeling pretty good right now. Not much irritation at the front of the knee.        OBJECTIVE:     Observation:    Test measurements:           ROM LEFT RIGHT   HIP Flex       HIP Abd       HIP Ext       HIP IR 5  25    HIP ER  WFL WNL   Knee ext 0 0   Knee Flex 128 130   Strength  LEFT RIGHT   HIP Flexors 21.5 26.7   HIP Abductors 25.9 27.9 HIP Ext 27.5 22.2   Hip ER       Knee EXT (quad) 33.6 36.7   Knee Flex (HS) 32.7 28.6         RESTRICTIONS/PRECAUTIONS: none    Exercises/Interventions:     Therapeutic Ex (56952)   Min:  20 min Sets/sec Reps Notes/CUES   Retro Stepper/BIKE 1 5    BOSU lunge- fwd and lateral 10 10 Cues form   SC march 2 20    Wall sit- cues heels 5sec 20 80-90 degrees   Standing adductor stretch 30 4 left   Hamstring stretch 30 4 left   LAQ c holds in chair 5sec 10 4lb   Hip Ext Terri Solum      BOSU fwd/side lunge      BOSU squat held     Leg Press Iso/Con/Ecc 0- 2 10 80 lb, 2 to 1   Cybex HS curl      TRX partial squat 1 15    Glute side walks 1 20 Orange @ shins, cues for form   RDL      Slide Lunge 5 10    Slide HS eccentrics      Step ups/ecc step down 1 15 4 in- difficulty with eccentric control   Swissball wall rolls- in SLS- hip drive      Quad hip ext/wall-ball rolls                  Manual Intervention (75331)  Min: 14 min      Knee mobs/PROM 1 4 Rotational TF mobs, flexion mobs   Tib/Fem Mobs      Patella Mobs 1 2    L hip belt mobs 1 6    IASTM 1 2 Medial joint line, patellar tendon         NMR re-education (10264)  Min:   CUES NEEDED   [place]i/Biofeedback 10/10      BFR      G. Med activation      Hip Ext full ROM/ G. Activation      Bosu Bal and Prop- G Med      Single leg stance/Balance/Prop      Bosu Retro G. Med act      Prone Hip froggers- sliders/elevated            Therapeutic Activity (45713)  Min:      Ladders      Plyos      Dynamic Balance                    Therapeutic Exercise and NMR EXR  [x] (47824) Provided verbal/tactile cueing for activities related to strengthening, flexibility, endurance, ROM for improvements in LE, proximal hip, and core control with self care, mobility, lifting, ambulation.   [x] (71617) Provided verbal/tactile cueing for activities related to improving balance, coordination, kinesthetic sense, posture, motor skill, proprioception  to assist with LE, proximal hip, and core control in self care, mobility, lifting, ambulation and eccentric single leg control. NMR and Therapeutic Activities:    [x] (30252 or 73315) Provided verbal/tactile cueing for activities related to improving balance, coordination, kinesthetic sense, posture, motor skill, proprioception and motor activation to allow for proper function of core, proximal hip and LE with self care and ADLs and functional mobility.   [] (53855) Gait Re-education- Provided training and instruction to the patient for proper LE, core and proximal hip recruitment and positioning and eccentric body weight control with ambulation re-education including up and down stairs     Home Exercise Program:    [x] (33714) Reviewed/Progressed HEP activities related to strengthening, flexibility, endurance, ROM of core, proximal hip and LE for functional self-care, mobility, lifting and ambulation/stair navigation   [] (61438)Reviewed/Progressed HEP activities related to improving balance, coordination, kinesthetic sense, posture, motor skill, proprioception of core, proximal hip and LE for self care, mobility, lifting, and ambulation/stair navigation      Manual Treatments:  PROM / STM / Oscillations-Mobs:  G-I, II, III, IV (PA's, Inf., Post.)  [x] (52514) Provided manual therapy to mobilize LE, proximal hip and/or LS spine soft tissue/joints for the purpose of modulating pain, promoting relaxation,  increasing ROM, reducing/eliminating soft tissue swelling/inflammation/restriction, improving soft tissue extensibility and allowing for proper ROM for normal function with self care, mobility, lifting and ambulation. Modalities:     [] GAME READY (VASO)- for significant edema, swelling, pain control.      Charges:  Timed Code Treatment Minutes: 34   Total Treatment Minutes: 34      [] EVAL (LOW) 27402 (typically 20 minutes face-to-face)  [] EVAL (MOD) 30384 (typically 30 minutes face-to-face)  [] EVAL (HIGH) 35488 (typically 45 minutes face-to-face)  [] RE-EVAL     [x] DJ(90725) x  1  KX  [] DRY NEEDLE 1 OR 2 MUSCLES  [] NMR (82271) x     [] DRY NEEDLE 3+ MUSCLES  [x] Manual (99095) x 1   KX   [] TA (32135) x     [] Mech Traction (57864)  [] ES(attended) (58819)     [] ES (un) (90907):   [] VASO (43925)  [] Other:    If [facility] Please Indicate Time In/Out  CPT Code Time in Time out                                   GOALS:  Patient stated goal: reduce knee pain   [x] Progressing: [] Met: [] Not Met: [] Adjusted    Therapist goals for Patient:   Short Term Goals: To be achieved in: 2 weeks  1. Independent in HEP and progression per patient tolerance, in order to prevent re-injury. [] Progressing: [] Met: [] Not Met: [] Adjusted  2. Patient will have a decrease in pain to facilitate improvement in movement, function, and ADLs as indicated by Functional Deficits. [] Progressing: [] Met: [] Not Met: [] Adjusted    Long Term Goals: To be achieved in: 6 weeks  1. Disability index score of 25% or less for the LEFS to assist with reaching prior level of function. [] Progressing: [] Met: [] Not Met: [] Adjusted  2. Patient will demonstrate an increase in Strength to good proximal hip strength and control, within 5lb HHD in LE to allow for proper functional mobility as indicated by patients Functional Deficits. [] Progressing: [] Met: [] Not Met: [] Adjusted  3. Patient will return to standing and sitting functional activities without increased symptoms or restriction. [] Progressing: [] Met: [] Not Met: [] Adjusted  4. Patient will report being able to perform stairs without increased symptoms or restriction. (patient specific functional goal)    [] Progressing: [] Met: [] Not Met: [] Adjusted    ASSESSMENT: No complaints of increased pain at conclusion. Overall good ROM of L knee; however is limited in L hip ROM. Decreased soft tissue restriction in medial joint line as well as decreased quad strength and TF rotational mobility.  Patient will benefit from further

## 2021-12-02 ENCOUNTER — APPOINTMENT (OUTPATIENT)
Dept: PHYSICAL THERAPY | Age: 66
End: 2021-12-02
Payer: MEDICARE

## 2021-12-09 ENCOUNTER — HOSPITAL ENCOUNTER (OUTPATIENT)
Dept: PHYSICAL THERAPY | Age: 66
Setting detail: THERAPIES SERIES
Discharge: HOME | End: 2021-12-09
Payer: MEDICARE

## 2021-12-09 PROCEDURE — 97110 THERAPEUTIC EXERCISES: CPT

## 2021-12-09 PROCEDURE — 97140 MANUAL THERAPY 1/> REGIONS: CPT

## 2021-12-09 NOTE — FLOWSHEET NOTE
[address] [place], [place] [address]Elis  Phone: [phone] Fax: [phone]             Physical Therapy Treatment Note/ Progress Report:     Date:  2021    Patient Name:  Abhay Duong  \"CORNELIA\"  :  1955  MRN: [id number]  Restrictions/Precautions:    Medical/Treatment Diagnosis Information:  Diagnosis: medial meniscus tear, L knee pain  Treatment Diagnosis: L knee pain D53.866  Insurance/Certification information:  PT Insurance Information: [facility]  Physician Information:  Referring Practitioner: Dr De La Fuente Areas of care signed (Y/N):     Date of Patient follow up with Physician: not known      Progress Report: []  Yes  [x]  No     Date Range for reporting period:  Beginnin2021  Ending:  -    Progress report due (10 Rx/or 30 days whichever is less):      Recertification due (POC duration/ or 90 days whichever is less): 2021    Visit # Insurance Allowable Auth Needed   4 (16 prior in year) [facility] (med guidelines) []Yes   [x]No     Latex Allergy:  [x]NO      []YES  Preferred Language for Healthcare:   [x]English       []other:  Functional Scale: LEFS 22.5% disability Date assessed:2021    Pain level:  1-2/10     SUBJECTIVE:  Patient reports to therapy today feeling a little weak and fatigued; he has been tested for COVID and Flu (results negative) and had a bad cough which left his abdominal muscles sore. The knee is doing well; Pt has not been up on his feet much.     OBJECTIVE:     Observation:    Test measurements:           ROM LEFT RIGHT   HIP Flex       HIP Abd       HIP Ext       HIP IR 5  25    HIP ER  WFL WNL   Knee ext 0 0   Knee Flex 128 130   Strength  LEFT RIGHT   HIP Flexors 21.5 26.7   HIP Abductors 25.9 27.9   HIP Ext 27.5 22.2   Hip ER       Knee EXT (quad) 33.6 36.7   Knee Flex (HS) 32.7 28.6         RESTRICTIONS/PRECAUTIONS: none    Exercises/Interventions:     Therapeutic Ex (07104)   Min:  16 min Sets/sec Reps Notes/CUES   Retro Stepper/BIKE    BOSU lunge- fwd and lateral 10 10 Cues form   SC march 2 20    Wall sit- cues heels 5sec 20 80-90 degrees   Standing adductor stretch 30 4 left   Hamstring stretch 30 4 left   LAQ c holds in chair 5sec 15 4lb   Hip Ext /table      BOSU fwd/side lunge      BOSU squat held     Leg Press Iso/Con/Ecc 0- 2 10 80 lb, 2 to 1   Cybex HS curl      TRX partial squat 1 15    Glute side walks 1 20 Orange @ shins, cues for form   Airex Marches @ wall 1 20 Cues for quad and glute control    Slide Lunge 5 10    Slide HS eccentrics      Step ups/ecc step down 1 15 4 in- difficulty with eccentric control   Swissball wall rolls- in SLS- hip drive      Quad hip ext/wall-ball rolls                  Manual Intervention (29413)  Min: 16 min      Knee mobs/PROM 1 4 Rotational TF mobs, flexion mobs   Tib/Fem Mobs      Patella Mobs 1 2    L hip belt mobs 1 6    IASTM 1 4 Medial joint line, patellar tendon         NMR re-education (69212)  Min:   CUES NEEDED   Mozambican/Biofeedback 10/10      BFR      G. Med activation      Hip Ext full ROM/ G. Activation      Bosu Bal and Prop- G Med      Single leg stance/Balance/Prop      Bosu Retro G. Med act      Prone Hip froggers- sliders/elevated            Therapeutic Activity (82568)  Min:      Ladders      Plyos      Dynamic Balance                    Therapeutic Exercise and NMR EXR  [x] (26032) Provided verbal/tactile cueing for activities related to strengthening, flexibility, endurance, ROM for improvements in LE, proximal hip, and core control with self care, mobility, lifting, ambulation. [x] (09240) Provided verbal/tactile cueing for activities related to improving balance, coordination, kinesthetic sense, posture, motor skill, proprioception  to assist with LE, proximal hip, and core control in self care, mobility, lifting, ambulation and eccentric single leg control.      NMR and Therapeutic Activities:    [x] (80285 ([phone]) x 1   KX   [] TA (63093) x     [] [facility] Traction (33752)  [] ES(attended) (79278)     [] ES (un) (81983):   [] VASO (15433)  [] Other:    If BWC Please Indicate Time In/Out  CPT Code Time in Time out                                   GOALS:  Patient stated goal: reduce knee pain   [x] Progressing: [] Met: [] Not Met: [] Adjusted    Therapist goals for Patient:   Short Term Goals: To be achieved in: 2 weeks  1. Independent in HEP and progression per patient tolerance, in order to prevent re-injury. [] Progressing: [] Met: [] Not Met: [] Adjusted  2. Patient will have a decrease in pain to facilitate improvement in movement, function, and ADLs as indicated by Functional Deficits. [] Progressing: [] Met: [] Not Met: [] Adjusted    Long Term Goals: To be achieved in: 6 weeks  1. Disability index score of 25% or less for the LEFS to assist with reaching prior level of function. [] Progressing: [] Met: [] Not Met: [] Adjusted  2. Patient will demonstrate an increase in Strength to good proximal hip strength and control, within 5lb HHD in LE to allow for proper functional mobility as indicated by patients Functional Deficits. [] Progressing: [] Met: [] Not Met: [] Adjusted  3. Patient will return to standing and sitting functional activities without increased symptoms or restriction. [] Progressing: [] Met: [] Not Met: [] Adjusted  4. Patient will report being able to perform stairs without increased symptoms or restriction. (patient specific functional goal)    [] Progressing: [] Met: [] Not Met: [] Adjusted    ASSESSMENT: No complaints of increased pain at conclusion; just limited by overall body fatigue. Good ROM of L knee; however is limited in L hip ROM. Decreased soft tissue restriction in medial joint line as well as decreased quad strength and TF rotational mobility. Patient will benefit from further skilled PT services to address noted deficits.       Return to Play: (if applicable)   []  Stage 1: Intro to Strength   []  Stage 2: Return to Run and Strength   []  Stage 3: Return to Jump and Strength   []  Stage 4: Dynamic Strength and Agility   []  Stage 5: Sport Specific Training     []  Ready to Return to Play, Meets All Above Stages   []  Not Ready for Return to Sports   Comments:            Treatment/Activity Tolerance:  [x] Patient tolerated treatment well [] Patient limited by fatique  [] Patient limited by pain  [] Patient limited by other medical complications  [] Other:     Overall Progression Towards Functional goals/ Treatment Progress Update:  [] Patient is progressing as expected towards functional goals listed. [] Progression is slowed due to complexities/Impairments listed. [] Progression has been slowed due to co-morbidities. [x] Plan just implemented, too soon to assess goals progression <30days   [] Goals require adjustment due to lack of progress  [] Patient is not progressing as expected and requires additional follow up with physician  [] Other    Prognosis for POC: [x] Good [] Fair  [] Poor    Patient requires continued skilled intervention: [x] Yes  [] No        PLAN: See eval  [x] Continue per plan of care [] Alter current plan (see comments)  [] Plan of care initiated [] Hold pending MD visit [] Discharge    Electronically signed by: Erwin Cartwright PTA    Note: If patient does not return for scheduled/recommended follow up visits, this note will serve as a discharge from care along with the most recent update on progress.

## 2021-12-14 ENCOUNTER — HOSPITAL ENCOUNTER (OUTPATIENT)
Dept: PHYSICAL THERAPY | Age: 66
Setting detail: THERAPIES SERIES
Discharge: HOME | End: 2021-12-14
Payer: MEDICARE

## 2021-12-14 PROCEDURE — 97140 MANUAL THERAPY 1/> REGIONS: CPT

## 2021-12-14 PROCEDURE — 97110 THERAPEUTIC EXERCISES: CPT

## 2021-12-14 NOTE — FLOWSHEET NOTE
[address] [place] [address] [facility]  Phone: [phone] Fax: [phone]             Physical Therapy Treatment Note/ Progress Report:     Date:  2021    Patient Name:  Liane Biswas  \"CORNELIA\"  :  1955  MRN: [id number]  Restrictions/Precautions:    Medical/Treatment Diagnosis Information:  Diagnosis: medial meniscus tear, L knee pain  Treatment Diagnosis: L knee pain A05.217  Insurance/Certification information:  PT Insurance Information: [facility]  Physician Information:  Referring Practitioner: Dr Charly Rendon of care signed (Y/N):     Date of Patient follow up with Physician: not known      Progress Report: []  Yes  [x]  No     Date Range for reporting period:  Beginnin2021  Ending:  -    Progress report due (10 Rx/or 30 days whichever is less):      Recertification due (POC duration/ or 90 days whichever is less): 2021    Visit # Insurance Allowable Auth Needed   5 (16 prior in year) [facility] (med guidelines) []Yes   [x]No     Latex Allergy:  [x]NO      []YES  Preferred Language for Healthcare:   [x]English       []other:  Functional Scale: LEFS 22.5% disability Date assessed:2021    Pain level:  1-2/10     SUBJECTIVE:  Patient reports that knee is feeling a bit better; not quite back up to where he was prior to resuming therapy; but not as painful. Patient notes that he hasn't worn brace for knee since Saturday. Does report that he was able to walk about a 1/2 mile without brace and without increased discomfort. OBJECTIVE:     Observation: Tight in L hip adductor soft tissue.    Test measurements:           ROM LEFT RIGHT   HIP Flex       HIP Abd       HIP Ext       HIP IR 5  25    HIP ER  WFL WNL   Knee ext 0 0   Knee Flex 128 130   Strength  LEFT RIGHT   HIP Flexors 21.5 26.7   HIP Abductors 25.9 27.9   HIP Ext 27.5 22.2   Hip ER       Knee EXT (quad) 33.6 36.7   Knee Flex (HS) 32.7 28.6         RESTRICTIONS/PRECAUTIONS: none    Exercises/Interventions:     Therapeutic Ex (91870)   Min:  28 min Sets/sec Reps Notes/CUES   Retro Stepper/BIKE    BOSU lunge- fwd and lateral 10 10 Cues form   SC march 2 20    Wall sit- cues heels 5sec 20 80-90 degrees   Standing adductor stretch 30 4 left   Hamstring stretch 30 4 left   Supine hip adductor stretch with strap 30 2    Hip fall outs in supine 10sec 20    Seated SLR with LE ER 5sec holds 10    LAQ c holds in chair 5sec 15 4lb   Hip Abd with eccentric control to return 1 10 green   Hip Add with eccentric control 1 10 green   BOSU squat held     Leg Press Iso/Con/Ecc 0- 2 10 80 lb, 2 to 1   Cybex HS curl      TRX partial squat 1 15    Glute side walks 1 20 Orange @ shins, cues for form   Airex Marches @ wall 1 20 Cues for quad and glute control    Slide Lunge 5 10    Slide HS eccentrics      Step ups/ecc step down 1 15 4 in- difficulty with eccentric control   Swissball wall rolls- in SLS- hip drive      Quad hip ext/wall-ball rolls                  Manual Intervention (74421)  Min: 16 min      Knee mobs/PROM 1 0 Rotational TF mobs, flexion mobs   Tib/Fem Mobs      Patella Mobs 1 2    L hip belt mobs 1 6    IASTM 1 8 Medial joint line, patellar tendon, L adductor         NMR re-education (10562)  Min:   CUES NEEDED   Iraqi/Biofeedback 10/10      BFR      G. Med activation      Hip Ext full ROM/ G. Activation      Bosu Bal and Prop- G Med      Single leg stance/Balance/Prop      Bosu Retro G. Med act      Prone Hip froggers- sliders/elevated            Therapeutic Activity (11231)  Min:      Ladders      Plyos      Dynamic Balance                    Therapeutic Exercise and NMR EXR  [x] (09334) Provided verbal/tactile cueing for activities related to strengthening, flexibility, endurance, ROM for improvements in LE, proximal hip, and core control with self care, mobility, lifting, ambulation.   [x] (95049) Provided verbal/tactile cueing for activities related to improving balance, coordination, kinesthetic sense, posture, motor skill, proprioception  to assist with LE, proximal hip, and core control in self care, mobility, lifting, ambulation and eccentric single leg control. NMR and Therapeutic Activities:    [x] (71788 or 35448) Provided verbal/tactile cueing for activities related to improving balance, coordination, kinesthetic sense, posture, motor skill, proprioception and motor activation to allow for proper function of core, proximal hip and LE with self care and ADLs and functional mobility.   [] (75778) Gait Re-education- Provided training and instruction to the patient for proper LE, core and proximal hip recruitment and positioning and eccentric body weight control with ambulation re-education including up and down stairs     Home Exercise Program:    [x] (64837) Reviewed/Progressed HEP activities related to strengthening, flexibility, endurance, ROM of core, proximal hip and LE for functional self-care, mobility, lifting and ambulation/stair navigation   [] (45273)Reviewed/Progressed HEP activities related to improving balance, coordination, kinesthetic sense, posture, motor skill, proprioception of core, proximal hip and LE for self care, mobility, lifting, and ambulation/stair navigation      Manual Treatments:  PROM / STM / Oscillations-Mobs:  G-I, II, III, IV (PA's, Inf., Post.)  [x] (01762) Provided manual therapy to mobilize LE, proximal hip and/or LS spine soft tissue/joints for the purpose of modulating pain, promoting relaxation,  increasing ROM, reducing/eliminating soft tissue swelling/inflammation/restriction, improving soft tissue extensibility and allowing for proper ROM for normal function with self care, mobility, lifting and ambulation. Modalities:     [] GAME READY (VASO)- for significant edema, swelling, pain control.      Charges:  Timed Code Treatment Minutes: 44   Total Treatment Minutes: 45      [] EVAL (LOW) 23369 (typically 20 minutes face-to-face)  [] EVAL (MOD) 44763 (typically 30 minutes face-to-face)  [] EVAL (HIGH) 67540 (typically 45 minutes face-to-face)  [] RE-EVAL     [x] TZ(98400) x  2  KX  [] DRY NEEDLE 1 OR 2 MUSCLES  [] NMR (81312) x     [] DRY NEEDLE 3+ MUSCLES  [x] Manual (47944) x 1   KX   [] TA (77134) x     [] Mech Traction (81040)  [] ES(attended) (65664)     [] ES (un) (94451):   [] VASO (04670)  [] Other:    If BWC Please Indicate Time In/Out  CPT Code Time in Time out                                   GOALS:  Patient stated goal: reduce knee pain   [x] Progressing: [] Met: [] Not Met: [] Adjusted    Therapist goals for Patient:   Short Term Goals: To be achieved in: 2 weeks  1. Independent in HEP and progression per patient tolerance, in order to prevent re-injury. [] Progressing: [] Met: [] Not Met: [] Adjusted  2. Patient will have a decrease in pain to facilitate improvement in movement, function, and ADLs as indicated by Functional Deficits. [] Progressing: [] Met: [] Not Met: [] Adjusted    Long Term Goals: To be achieved in: 6 weeks  1. Disability index score of 25% or less for the LEFS to assist with reaching prior level of function. [] Progressing: [] Met: [] Not Met: [] Adjusted  2. Patient will demonstrate an increase in Strength to good proximal hip strength and control, within 5lb HHD in LE to allow for proper functional mobility as indicated by patients Functional Deficits. [] Progressing: [] Met: [] Not Met: [] Adjusted  3. Patient will return to standing and sitting functional activities without increased symptoms or restriction. [] Progressing: [] Met: [] Not Met: [] Adjusted  4. Patient will report being able to perform stairs without increased symptoms or restriction. (patient specific functional goal)    [] Progressing: [] Met: [] Not Met: [] Adjusted    ASSESSMENT: Patient with increased soft tissue restriction along adductors.  Patient tolerated soft tissue mobilization to area and doing well with hip IR stretching. Added further stretching of adductors. Did well with all activation and stretching exercises. Patient reporting that he has improved discomfort in knee at conclusion. Patient will benefit from further skilled PT services to address noted deficits. Return to Play: (if applicable)   []  Stage 1: Intro to Strength   []  Stage 2: Return to Run and Strength   []  Stage 3: Return to Jump and Strength   []  Stage 4: Dynamic Strength and Agility   []  Stage 5: Sport Specific Training     []  Ready to Return to Play, Meets All Above Stages   []  Not Ready for Return to Sports   Comments:            Treatment/Activity Tolerance:  [x] Patient tolerated treatment well [] Patient limited by fatique  [] Patient limited by pain  [] Patient limited by other medical complications  [] Other:     Overall Progression Towards Functional goals/ Treatment Progress Update:  [] Patient is progressing as expected towards functional goals listed. [] Progression is slowed due to complexities/Impairments listed. [] Progression has been slowed due to co-morbidities. [x] Plan just implemented, too soon to assess goals progression <30days   [] Goals require adjustment due to lack of progress  [] Patient is not progressing as expected and requires additional follow up with physician  [] Other    Prognosis for POC: [x] Good [] Fair  [] Poor    Patient requires continued skilled intervention: [x] Yes  [] No        PLAN: See eval  [x] Continue per plan of care [] Alter current plan (see comments)  [] Plan of care initiated [] Hold pending MD visit [] Discharge    Electronically signed by: Yancy Leal, PT    Note: If patient does not return for scheduled/recommended follow up visits, this note will serve as a discharge from care along with the most recent update on progress.

## 2021-12-21 ENCOUNTER — HOSPITAL ENCOUNTER (OUTPATIENT)
Dept: PHYSICAL THERAPY | Age: 66
Setting detail: THERAPIES SERIES
Discharge: HOME | End: 2021-12-21
Payer: MEDICARE

## 2021-12-21 PROCEDURE — 97140 MANUAL THERAPY 1/> REGIONS: CPT

## 2021-12-21 PROCEDURE — 97110 THERAPEUTIC EXERCISES: CPT

## 2021-12-21 NOTE — FLOWSHEET NOTE
[facility] [address] [address]  Phone: [phone] Fax: [phone]             Physical Therapy Treatment Note/ Progress Report:     Date:  2021    Patient Name:  Dave Miller  \"CORNELIA\"  :  1955  MRN: [id number]  Restrictions/Precautions:    Medical/Treatment Diagnosis Information:  Diagnosis: medial meniscus tear, L knee pain  Treatment Diagnosis: L knee pain B38.355  Insurance/Certification information:  PT Insurance Information: [facility]  Physician Information:  Referring Practitioner: Dr Denny Grajeda of care signed (Y/N):     Date of Patient follow up with Physician: not known      Progress Report: []  Yes  [x]  No     Date Range for reporting period:  Beginnin2021  Ending:  -    Progress report due (10 Rx/or 30 days whichever is less):      Recertification due (POC duration/ or 90 days whichever is less): 2021    Visit # Insurance Allowable Auth Needed   6 (16 prior in year) [facility] (med guidelines) []Yes   [x]No     Latex Allergy:  [x]NO      []YES  Preferred Language for Healthcare:   [x]English       []other:  Functional Scale: LEFS 22.5% disability Date assessed:2021    Pain level:  1-2/10     SUBJECTIVE:  Patient reports that he was very sore through adductors after last session. Notes that he was sore through today. Patient not having as much knee pain, just sore in adductors. Feels like the hip is not as tight. OBJECTIVE:     Observation: Tight in L hip adductor soft tissue.    Test measurements:           ROM LEFT RIGHT   HIP Flex       HIP Abd       HIP Ext       HIP IR 5  25    HIP ER  WFL WNL   Knee ext 0 0   Knee Flex 128 130   Strength  LEFT RIGHT   HIP Flexors 21.5 26.7   HIP Abductors 25.9 27.9   HIP Ext 27.5 22.2   Hip ER       Knee EXT (quad) 33.6 36.7   Knee Flex (HS) 32.7 28.6         RESTRICTIONS/PRECAUTIONS: none    Exercises/Interventions: Therapeutic Ex (20414)   Min:  25 min Sets/sec Reps Notes/CUES   Retro Stepper/BIKE    BOSU lunge- fwd and lateral 10 10 Cues form   SC march 2 20    Wall sit- cues heels 5sec 20 80-90 degrees   Standing adductor stretch 30 4 left   Hamstring stretch 30 4 left   Supine hip adductor stretch with strap 30 2    Hip fall outs in supine 10sec 20    Seated SLR with LE ER 5sec holds 10    LAQ c holds in chair 5sec 15 4lb   Hip Abd with eccentric control to return 1 10 green   Hip Add with eccentric control 1 10 green   BOSU squat held     Leg Press Iso/Con/Ecc 0- 2 10 80 lb, 2 to 1   Cybex HS curl      TRX partial squat 1 15    Glute side walks 1 20 Orange @ shins, cues for form   Airex Marches @ wall 1 20 Cues for quad and glute control    Slide Lunge 5 10    Slide HS eccentrics      Step ups/ecc step down 1 15 4 in- difficulty with eccentric control   Swissball wall rolls- in SLS- hip drive      Quad hip ext/wall-ball rolls                  Manual Intervention (25919)  Min: 18 min      Knee mobs/PROM 1 0 Rotational TF mobs, flexion mobs   Tib/Fem Mobs      Patella Mobs 1 2    L hip belt mobs 1 6    IASTM 1 10 Medial joint line, L adductor         NMR re-education (45813)  Min:   CUES NEEDED   Slovenian/Biofeedback 10/10      BFR      G. Med activation      Hip Ext full ROM/ G. Activation      Bosu Bal and Prop- G Med      Single leg stance/Balance/Prop      Bosu Retro G. Med act      Prone Hip froggers- sliders/elevated            Therapeutic Activity (65954)  Min:      Ladders      Plyos      Dynamic Balance                    Therapeutic Exercise and NMR EXR  [x] (31415) Provided verbal/tactile cueing for activities related to strengthening, flexibility, endurance, ROM for improvements in LE, proximal hip, and core control with self care, mobility, lifting, ambulation.   [x] (88421) Provided verbal/tactile cueing for activities related to improving balance, coordination, kinesthetic sense, posture, motor skill, proprioception  to assist with LE, proximal hip, and core control in self care, mobility, lifting, ambulation and eccentric single leg control. NMR and Therapeutic Activities:    [x] (49484 or 71118) Provided verbal/tactile cueing for activities related to improving balance, coordination, kinesthetic sense, posture, motor skill, proprioception and motor activation to allow for proper function of core, proximal hip and LE with self care and ADLs and functional mobility.   [] (93483) Gait Re-education- Provided training and instruction to the patient for proper LE, core and proximal hip recruitment and positioning and eccentric body weight control with ambulation re-education including up and down stairs     Home Exercise Program:    [x] (78283) Reviewed/Progressed HEP activities related to strengthening, flexibility, endurance, ROM of core, proximal hip and LE for functional self-care, mobility, lifting and ambulation/stair navigation   [] (28200)Reviewed/Progressed HEP activities related to improving balance, coordination, kinesthetic sense, posture, motor skill, proprioception of core, proximal hip and LE for self care, mobility, lifting, and ambulation/stair navigation      Manual Treatments:  PROM / STM / Oscillations-Mobs:  G-I, II, III, IV (PA's, Inf., Post.)  [x] (28400) Provided manual therapy to mobilize LE, proximal hip and/or LS spine soft tissue/joints for the purpose of modulating pain, promoting relaxation,  increasing ROM, reducing/eliminating soft tissue swelling/inflammation/restriction, improving soft tissue extensibility and allowing for proper ROM for normal function with self care, mobility, lifting and ambulation. Modalities:     [] GAME READY (VASO)- for significant edema, swelling, pain control.      Charges:  Timed Code Treatment Minutes: 43   Total Treatment Minutes: 44      [] EVAL (LOW) 68207 (typically 20 minutes face-to-face)  [] EVAL (MOD) 93636 (typically 30 minutes face-to-face)  [] EVAL (HIGH) 48933 (typically 45 minutes face-to-face)  [] RE-EVAL     [x] PB(81229) x  2  KX  [] DRY NEEDLE 1 OR 2 MUSCLES  [] NMR (46238) x     [] DRY NEEDLE 3+ MUSCLES  [x] Manual (35772) x 1   KX   [] TA (70271) x     [] Mech Traction (17779)  [] ES(attended) (39003)     [] ES (un) (12050):   [] VASO (59935)  [] Other:    If BWC Please Indicate Time In/Out  CPT Code Time in Time out                                   GOALS:  Patient stated goal: reduce knee pain   [x] Progressing: [] Met: [] Not Met: [] Adjusted    Therapist goals for Patient:   Short Term Goals: To be achieved in: 2 weeks  1. Independent in HEP and progression per patient tolerance, in order to prevent re-injury. [] Progressing: [] Met: [] Not Met: [] Adjusted  2. Patient will have a decrease in pain to facilitate improvement in movement, function, and ADLs as indicated by Functional Deficits. [] Progressing: [] Met: [] Not Met: [] Adjusted    Long Term Goals: To be achieved in: 6 weeks  1. Disability index score of 25% or less for the LEFS to assist with reaching prior level of function. [] Progressing: [] Met: [] Not Met: [] Adjusted  2. Patient will demonstrate an increase in Strength to good proximal hip strength and control, within 5lb HHD in LE to allow for proper functional mobility as indicated by patients Functional Deficits. [] Progressing: [] Met: [] Not Met: [] Adjusted  3. Patient will return to standing and sitting functional activities without increased symptoms or restriction. [] Progressing: [] Met: [] Not Met: [] Adjusted  4. Patient will report being able to perform stairs without increased symptoms or restriction. (patient specific functional goal)    [] Progressing: [] Met: [] Not Met: [] Adjusted    ASSESSMENT: Patient with continued soft tissue restriction in adductors; however less than last session. Additionally, hip IR not as restricted. Tolerated IASTM to area as well as hip mobilization. Patient did well with all hip strengthening and stretching. No reports of knee pain at conclusion; just fatigue in hip. Return to Play: (if applicable)   []  Stage 1: Intro to Strength   []  Stage 2: Return to Run and Strength   []  Stage 3: Return to Jump and Strength   []  Stage 4: Dynamic Strength and Agility   []  Stage 5: Sport Specific Training     []  Ready to Return to Play, Meets All Above Stages   []  Not Ready for Return to Sports   Comments:            Treatment/Activity Tolerance:  [x] Patient tolerated treatment well [] Patient limited by fatique  [] Patient limited by pain  [] Patient limited by other medical complications  [] Other:     Overall Progression Towards Functional goals/ Treatment Progress Update:  [] Patient is progressing as expected towards functional goals listed. [] Progression is slowed due to complexities/Impairments listed. [] Progression has been slowed due to co-morbidities. [x] Plan just implemented, too soon to assess goals progression <30days   [] Goals require adjustment due to lack of progress  [] Patient is not progressing as expected and requires additional follow up with physician  [] Other    Prognosis for POC: [x] Good [] Fair  [] Poor    Patient requires continued skilled intervention: [x] Yes  [] No        PLAN: See eval  [x] Continue per plan of care [] Alter current plan (see comments)  [] Plan of care initiated [] Hold pending MD visit [] Discharge    Electronically signed by: Bella Thompson PT    Note: If patient does not return for scheduled/recommended follow up visits, this note will serve as a discharge from care along with the most recent update on progress.

## 2021-12-28 ENCOUNTER — HOSPITAL ENCOUNTER (OUTPATIENT)
Dept: PHYSICAL THERAPY | Age: 66
Setting detail: THERAPIES SERIES
Discharge: HOME | End: 2021-12-28
Payer: MEDICARE

## 2021-12-28 PROCEDURE — 97110 THERAPEUTIC EXERCISES: CPT

## 2021-12-28 PROCEDURE — 97140 MANUAL THERAPY 1/> REGIONS: CPT

## 2021-12-28 NOTE — FLOWSHEET NOTE
20 min Sets/sec Reps Notes/CUES   Retro Stepper/BIKE    BOSU lunge- fwd and lateral 10 10 Cues form   SC march 2 20    Wall sit- cues heels 5sec 20 80-90 degrees   Standing adductor stretch 30 4 left   Hamstring stretch 30 4 left   Supine hip adductor stretch with strap 30 2    Hip fall outs in supine 0     Seated SLR with LE ER 5sec holds 10    LAQ c holds in chair 5sec 15 4lb   Hip Abd with eccentric control to return 1 10 green   Hip Add with eccentric control 1 10 green   BOSU squat held     Leg Press Iso/Con/Ecc 0- 2 10 80 lb, 2 to 1   Cybex HS curl      TRX partial squat 1 15    Glute side walks 1 20 Orange @ shins, cues for form   Airex Marches @ wall 1 20 Cues for quad and glute control    Slide Lunge 5 10    Slide HS eccentrics      Step ups/ecc step down 0  4 in- difficulty with eccentric control   Swissball wall rolls- in SLS- hip drive      Quad hip ext/wall-ball rolls                  Manual Intervention (60966)  Min: 28 min      Knee mobs/PROM 1 5 Rotational TF mobs, flexion mobs   SKTC glute stretch, adductor stretch 1 4    Patella Mobs 1 2    L hip belt mobs 1 0    IASTM 1 12 Medial joint line, L adductor   Prone lumbar PA/mobs 1 5    NMR re-education (49450)  Min:   CUES NEEDED   Thai/Biofeedback 10/10      BFR      G. Med activation      Hip Ext full ROM/ G. Activation      Bosu Bal and Prop- G Med      Single leg stance/Balance/Prop      Bosu Retro G. Med act      Prone Hip froggers- sliders/elevated            Therapeutic Activity (14678)  Min:      Ladders      Plyos      Dynamic Balance                    Therapeutic Exercise and NMR EXR  [x] (70574) Provided verbal/tactile cueing for activities related to strengthening, flexibility, endurance, ROM for improvements in LE, proximal hip, and core control with self care, mobility, lifting, ambulation.   [x] (35546) Provided verbal/tactile cueing for activities related to improving balance, coordination, kinesthetic sense, posture, motor skill, proprioception  to assist with LE, proximal hip, and core control in self care, mobility, lifting, ambulation and eccentric single leg control. NMR and Therapeutic Activities:    [x] (75548 or 14097) Provided verbal/tactile cueing for activities related to improving balance, coordination, kinesthetic sense, posture, motor skill, proprioception and motor activation to allow for proper function of core, proximal hip and LE with self care and ADLs and functional mobility.   [] (89000) Gait Re-education- Provided training and instruction to the patient for proper LE, core and proximal hip recruitment and positioning and eccentric body weight control with ambulation re-education including up and down stairs     Home Exercise Program:    [x] (42654) Reviewed/Progressed HEP activities related to strengthening, flexibility, endurance, ROM of core, proximal hip and LE for functional self-care, mobility, lifting and ambulation/stair navigation   [] (78601)Reviewed/Progressed HEP activities related to improving balance, coordination, kinesthetic sense, posture, motor skill, proprioception of core, proximal hip and LE for self care, mobility, lifting, and ambulation/stair navigation      Manual Treatments:  PROM / STM / Oscillations-Mobs:  G-I, II, III, IV (PA's, Inf., Post.)  [x] (48712) Provided manual therapy to mobilize LE, proximal hip and/or LS spine soft tissue/joints for the purpose of modulating pain, promoting relaxation,  increasing ROM, reducing/eliminating soft tissue swelling/inflammation/restriction, improving soft tissue extensibility and allowing for proper ROM for normal function with self care, mobility, lifting and ambulation. Modalities:     [] GAME READY (VASO)- for significant edema, swelling, pain control.      Charges:  Timed Code Treatment Minutes: 48   Total Treatment Minutes: 48      [] EVAL (LOW) 61542 (typically 20 minutes face-to-face)  [] EVAL (MOD) 10901 (typically 30 minutes area as well as hip mobilization. Patient did well with all hip strengthening and stretching. No reports of knee pain at conclusion; just fatigue in hip. Return to Play: (if applicable)   []  Stage 1: Intro to Strength   []  Stage 2: Return to Run and Strength   []  Stage 3: Return to Jump and Strength   []  Stage 4: Dynamic Strength and Agility   []  Stage 5: Sport Specific Training     []  Ready to Return to Play, Meets All Above Stages   []  Not Ready for Return to Sports   Comments:            Treatment/Activity Tolerance:  [x] Patient tolerated treatment well [] Patient limited by fatique  [] Patient limited by pain  [] Patient limited by other medical complications  [] Other:     Overall Progression Towards Functional goals/ Treatment Progress Update:  [] Patient is progressing as expected towards functional goals listed. [] Progression is slowed due to complexities/Impairments listed. [] Progression has been slowed due to co-morbidities. [x] Plan just implemented, too soon to assess goals progression <30days   [] Goals require adjustment due to lack of progress  [] Patient is not progressing as expected and requires additional follow up with physician  [] Other    Prognosis for POC: [x] Good [] Fair  [] Poor    Patient requires continued skilled intervention: [x] Yes  [] No        PLAN: See eval  [x] Continue per plan of care [] Alter current plan (see comments)  [] Plan of care initiated [] Hold pending MD visit [] Discharge    Electronically signed by: Iona Holliday PT    Note: If patient does not return for scheduled/recommended follow up visits, this note will serve as a discharge from care along with the most recent update on progress.

## 2022-01-04 ENCOUNTER — APPOINTMENT (OUTPATIENT)
Dept: PHYSICAL THERAPY | Age: 67
End: 2022-01-04
Payer: MEDICARE

## 2022-01-13 ENCOUNTER — HOSPITAL ENCOUNTER (OUTPATIENT)
Dept: PHYSICAL THERAPY | Age: 67
Setting detail: THERAPIES SERIES
Discharge: HOME | End: 2022-01-13
Payer: MEDICARE

## 2022-01-13 PROCEDURE — 97140 MANUAL THERAPY 1/> REGIONS: CPT

## 2022-01-13 PROCEDURE — 97110 THERAPEUTIC EXERCISES: CPT

## 2022-01-13 NOTE — FLOWSHEET NOTE
[address] [address] [place] [address]  Phone: [phone] Fax: [phone]             Physical Therapy Treatment Note/ Progress Report:     Date:  2022    Patient Name:  Aida Echeverria  \"CORNELIA\"  :  1955  MRN: [id number]  Restrictions/Precautions:    Medical/Treatment Diagnosis Information:  Diagnosis: medial meniscus tear, L knee pain  Treatment Diagnosis: L knee pain E83.750  Insurance/Certification information:  PT Insurance Information: [facility]  Physician Information:  Referring Practitioner: Dr Dilshad Owen of care signed (Y/N):     Date of Patient follow up with Physician: not known      Progress Report: []  Yes  [x]  No     Date Range for reporting period:  Beginnin2021  Ending:  -    Progress report due (10 Rx/or 30 days whichever is less): 66/15/9792     Recertification due (POC duration/ or 90 days whichever is less): 2021    Visit # Insurance Allowable Auth Needed   8 (16 prior in year) [facility] (med guidelines) []Yes   [x]No     Latex Allergy:  [x]NO      []YES  Preferred Language for Healthcare:   [x]English       []other:  Functional Scale: LEFS 22.5% disability Date assessed:2021    Pain level:  1-2/10     SUBJECTIVE:  Patient reports that his knee is feeling some better; but tender today along inside of thigh (points to adductor). Reports that he will be seeing Dr Gomez Will tomorrow to look further at hip. Hasn't been seen in a few weeks due to being ill. OBJECTIVE:     Observation: Tight in L hip adductor soft tissue.    Test measurements:           ROM LEFT RIGHT   HIP Flex       HIP Abd       HIP Ext       HIP IR 5  25    HIP ER  WFL WNL   Knee ext 0 0   Knee Flex 128 130   Strength  LEFT RIGHT   HIP Flexors 21.5 26.7   HIP Abductors 25.9 27.9   HIP Ext 27.5 22.2   Hip ER       Knee EXT (quad) 33.6 36.7   Knee Flex (HS) 32.7 28.6         RESTRICTIONS/PRECAUTIONS: none    Exercises/Interventions:     Therapeutic Ex (67819)   Min:  20 min Sets/sec Reps Notes/CUES   Retro Stepper/BIKE    BOSU lunge- fwd and lateral 10 10 Cues form   SC march 2 20    Wall sit- cues heels 5sec 20 80-90 degrees   Standing adductor stretch 30 4 left   Hamstring stretch 30 4 left   Supine hip adductor stretch with strap 0     Hip fall outs in supine 20 4    Hip ER stretch 30 3    Seated SLR with LE ER 5sec holds 10    LAQ c holds in chair 5sec 15 4lb   Hip Abd with eccentric control to return 1 15 green   Hip Add with eccentric control 1 15 green   BOSU squat held     Leg Press Iso/Con/Ecc 0- 2 10 80 lb, 2 to 1   Cybex HS curl      TRX partial squat 1 15    Glute side walks 1 20 Orange @ shins, cues for form   Airex Marches @ wall 1 20 Cues for quad and glute control    Slide Lunge 5 10    Slide HS eccentrics      Step ups/ecc step down 1 10 6 in   Swissball wall rolls- in SLS- hip drive      Quad hip ext/wall-ball rolls                  Manual Intervention (33040)  Min: 24 min      Knee mobs/PROM 1 5 Rotational TF mobs, flexion mobs   SKTC glute stretch, adductor stretch 1 4    Patella Mobs 1 2    L hip belt mobs 1 0    IASTM 1 12 Medial joint line, L adductor   Prone lumbar PA/mobs 1 0    NMR re-education (31681)  Min:   CUES NEEDED   [place]er/Biofeedback 10/10      BFR      G. Med activation      Hip Ext full ROM/ G. Activation      Bosu Bal and Prop- G Med      Single leg stance/Balance/Prop      Bosu Retro G. Med act      Prone Hip froggers- sliders/elevated            Therapeutic Activity (04424)  Min:      Ladders      Plyos      Dynamic Balance                    Therapeutic Exercise and NMR EXR  [x] (98398) Provided verbal/tactile cueing for activities related to strengthening, flexibility, endurance, ROM for improvements in LE, proximal hip, and core control with self care, mobility, lifting, ambulation.   [x] (28163) Provided verbal/tactile cueing for activities related to improving balance, coordination, kinesthetic sense, posture, motor skill, proprioception  to assist with LE, proximal hip, and core control in self care, mobility, lifting, ambulation and eccentric single leg control. NMR and Therapeutic Activities:    [x] (19605 or 80024) Provided verbal/tactile cueing for activities related to improving balance, coordination, kinesthetic sense, posture, motor skill, proprioception and motor activation to allow for proper function of core, proximal hip and LE with self care and ADLs and functional mobility.   [] (37176) Gait Re-education- Provided training and instruction to the patient for proper LE, core and proximal hip recruitment and positioning and eccentric body weight control with ambulation re-education including up and down stairs     Home Exercise Program:    [x] (38416) Reviewed/Progressed HEP activities related to strengthening, flexibility, endurance, ROM of core, proximal hip and LE for functional self-care, mobility, lifting and ambulation/stair navigation   [] (29166)Reviewed/Progressed HEP activities related to improving balance, coordination, kinesthetic sense, posture, motor skill, proprioception of core, proximal hip and LE for self care, mobility, lifting, and ambulation/stair navigation      Manual Treatments:  PROM / STM / Oscillations-Mobs:  G-I, II, III, IV (PA's, Inf., Post.)  [x] (17257) Provided manual therapy to mobilize LE, proximal hip and/or LS spine soft tissue/joints for the purpose of modulating pain, promoting relaxation,  increasing ROM, reducing/eliminating soft tissue swelling/inflammation/restriction, improving soft tissue extensibility and allowing for proper ROM for normal function with self care, mobility, lifting and ambulation. Modalities:     [] GAME READY (VASO)- for significant edema, swelling, pain control.      Charges:  Timed Code Treatment Minutes: 44   Total Treatment Minutes: 45      [] EVAL (LOW) 48788 (typically 20 minutes with mobilization. Did well with all strengthening. Improving control with step ups and improving eccentric control. Return to Play: (if applicable)   []  Stage 1: Intro to Strength   []  Stage 2: Return to Run and Strength   []  Stage 3: Return to Jump and Strength   []  Stage 4: Dynamic Strength and Agility   []  Stage 5: Sport Specific Training     []  Ready to Return to Play, Meets All Above Stages   []  Not Ready for Return to Sports   Comments:            Treatment/Activity Tolerance:  [x] Patient tolerated treatment well [] Patient limited by fatique  [] Patient limited by pain  [] Patient limited by other medical complications  [] Other:     Overall Progression Towards Functional goals/ Treatment Progress Update:  [] Patient is progressing as expected towards functional goals listed. [] Progression is slowed due to complexities/Impairments listed. [] Progression has been slowed due to co-morbidities. [x] Plan just implemented, too soon to assess goals progression <30days   [] Goals require adjustment due to lack of progress  [] Patient is not progressing as expected and requires additional follow up with physician  [] Other    Prognosis for POC: [x] Good [] Fair  [] Poor    Patient requires continued skilled intervention: [x] Yes  [] No        PLAN: See eval  [x] Continue per plan of care [] Alter current plan (see comments)  [] Plan of care initiated [] Hold pending MD visit [] Discharge    Electronically signed by: Karlene Anton PT    Note: If patient does not return for scheduled/recommended follow up visits, this note will serve as a discharge from care along with the most recent update on progress.

## 2022-01-20 ENCOUNTER — HOSPITAL ENCOUNTER (OUTPATIENT)
Dept: PHYSICAL THERAPY | Age: 67
Setting detail: THERAPIES SERIES
Discharge: HOME | End: 2022-01-20
Payer: MEDICARE

## 2022-01-20 PROCEDURE — 97110 THERAPEUTIC EXERCISES: CPT

## 2022-01-20 PROCEDURE — 97140 MANUAL THERAPY 1/> REGIONS: CPT

## 2022-01-20 NOTE — FLOWSHEET NOTE
[address] [place], [place] [address]  Phone: [phone] Fax: [phone]             Physical Therapy Treatment Note/ Progress Report:     Date:  2022    Patient Name:  Aida Echeverria  \"CORNELIA\"  :  1955  MRN: [id number]  Restrictions/Precautions:    Medical/Treatment Diagnosis Information:  Diagnosis: medial meniscus tear, L knee pain  Treatment Diagnosis: L knee pain V62.825  Insurance/Certification information:  PT Insurance Information: [facility]  Physician Information:  Referring Practitioner: Dr Dilshad Owen of care signed (Y/N):     Date of Patient follow up with Physician: not known      Progress Report: []  Yes  [x]  No     Date Range for reporting period:  Beginnin2021  Ending:  -    Progress report due (10 Rx/or 30 days whichever is less):      Recertification due (POC duration/ or 90 days whichever is less): 2021    Visit # Insurance Allowable Auth Needed   9 (16 prior in year) [facility] (med guidelines) []Yes   [x]No     Latex Allergy:  [x]NO      []YES   Preferred Language for Healthcare:   [x]English       []other:  Functional Scale: LEFS 22.5% disability Date assessed:2021    Pain level:  3/10     SUBJECTIVE:  Pt reports that he had some shooting pain through the L knee last Thursday and Friday of last week. Pt went up and down the stairs a lot yesterday while doing multiple loads of laundry. Pain is worst below the knee on the inner side today. OBJECTIVE:  bilat hips tight with IR today.     Observation:    Test measurements:           ROM LEFT RIGHT   HIP Flex       HIP Abd       HIP Ext       HIP IR 5  25    HIP ER  WFL WNL   Knee ext 0 0   Knee Flex 128 130   Strength  LEFT RIGHT   HIP Flexors 21.5 26.7   HIP Abductors 25.9 27.9   HIP Ext 27.5 22.2   Hip ER       Knee EXT (quad) 33.6 36.7   Knee Flex (HS) 32.7 28.6         RESTRICTIONS/PRECAUTIONS: none    Exercises/Interventions:     Therapeutic Ex (32738)   Min:  20 min Sets/sec Reps Notes/CUES   Retro Stepper/BIKE    BOSU lunge- fwd and lateral 10 10 Cues form   SC march 2 20    Wall sit- cues heels 5sec 20 80-90 degrees   Standing adductor stretch 30 4 left   Hamstring stretch 30 4 left   Supine hip adductor stretch with strap 0     Hip fall outs in supine 20 4    Hip ER stretch 30 3    Seated SLR with LE ER 5sec holds 10    LAQ c holds in chair 5sec 15 4lb   SC marches 1 10 SBA   BOSU Lunge 5sec 8 bilat   Hip Abd with eccentric control to return 1 15 green   Hip Add with eccentric control 1 15 green   BOSU squat held     Leg Press Iso/Con/Ecc 0- 2 10 80 lb, 2 to 1   Cybex HS curl      TRX partial squat 1 15    Glute side walks 1 20 Orange @ shins, cues for form   Airex Marches @ wall 1 20 Cues for quad and glute control    Slide Lunge 5 10    Slide HS eccentrics      Step ups/ecc step down 1 10 6 in   Swissball wall rolls- in SLS- hip drive      Quad hip ext/wall-ball rolls                  Manual Intervention (40930)  Min: 23 min      Knee mobs/PROM 1 5 Rotational TF mobs, flexion mobs   SKTC glute stretch, adductor stretch 1 4    Patella Mobs 1 2    L hip belt mobs 1 6    IASTM 1 6 Medial joint line, L adductor   Prone lumbar PA/mobs 1 0    NMR re-education (92948)  Min:   CUES NEEDED   [place]ovan/Biofeedback 10/10      BFR      G. Med activation      Hip Ext full ROM/ G. Activation      Bosu Bal and Prop- G Med      Single leg stance/Balance/Prop      Bosu Retro G. Med act      Prone Hip froggers- sliders/elevated            Therapeutic Activity (06131)  Min:      Ladders      Plyos      Dynamic Balance                    Therapeutic Exercise and NMR EXR  [x] (62852) Provided verbal/tactile cueing for activities related to strengthening, flexibility, endurance, ROM for improvements in LE, proximal hip, and core control with self care, mobility, lifting, ambulation.   [x] (96838) Provided verbal/tactile cueing for activities related to improving balance, coordination, kinesthetic sense, posture, motor skill, proprioception  to assist with LE, proximal hip, and core control in self care, mobility, lifting, ambulation and eccentric single leg control. NMR and Therapeutic Activities:    [x] (36187 or 49378) Provided verbal/tactile cueing for activities related to improving balance, coordination, kinesthetic sense, posture, motor skill, proprioception and motor activation to allow for proper function of core, proximal hip and LE with self care and ADLs and functional mobility.   [] (38338) Gait Re-education- Provided training and instruction to the patient for proper LE, core and proximal hip recruitment and positioning and eccentric body weight control with ambulation re-education including up and down stairs     Home Exercise Program:    [x] (06350) Reviewed/Progressed HEP activities related to strengthening, flexibility, endurance, ROM of core, proximal hip and LE for functional self-care, mobility, lifting and ambulation/stair navigation   [] (25835)Reviewed/Progressed HEP activities related to improving balance, coordination, kinesthetic sense, posture, motor skill, proprioception of core, proximal hip and LE for self care, mobility, lifting, and ambulation/stair navigation      Manual Treatments:  PROM / STM / Oscillations-Mobs:  G-I, II, III, IV (PA's, Inf., Post.)  [x] (45270) Provided manual therapy to mobilize LE, proximal hip and/or LS spine soft tissue/joints for the purpose of modulating pain, promoting relaxation,  increasing ROM, reducing/eliminating soft tissue swelling/inflammation/restriction, improving soft tissue extensibility and allowing for proper ROM for normal function with self care, mobility, lifting and ambulation. Modalities:     [] GAME READY (VASO)- for significant edema, swelling, pain control.      Charges:  Timed Code Treatment Minutes: 43   Total Treatment Minutes: 43      [] EVAL (LOW) 55036 (typically 20 minutes face-to-face)  [] EVAL (MOD) 02545 (typically 30 minutes face-to-face)  [] EVAL (HIGH) 20066 (typically 45 minutes face-to-face)  [] RE-EVAL     [x] TG(32821) x  1    [] DRY NEEDLE 1 OR 2 MUSCLES  [] NMR (72922) x     [] DRY NEEDLE 3+ MUSCLES  [x] Manual (03274) x 2      [] TA (00187) x     [] Mech Traction (51336)  [] ES(attended) (70152)     [] ES (un) (30402):   [] VASO (61510)  [] Other:    If BWC Please Indicate Time In/Out  CPT Code Time in Time out                                   GOALS:  Patient stated goal: reduce knee pain   [x] Progressing: [] Met: [] Not Met: [] Adjusted    Therapist goals for Patient:   Short Term Goals: To be achieved in: 2 weeks  1. Independent in HEP and progression per patient tolerance, in order to prevent re-injury. [] Progressing: [] Met: [] Not Met: [] Adjusted  2. Patient will have a decrease in pain to facilitate improvement in movement, function, and ADLs as indicated by Functional Deficits. [] Progressing: [] Met: [] Not Met: [] Adjusted    Long Term Goals: To be achieved in: 6 weeks  1. Disability index score of 25% or less for the LEFS to assist with reaching prior level of function. [] Progressing: [] Met: [] Not Met: [] Adjusted  2. Patient will demonstrate an increase in Strength to good proximal hip strength and control, within 5lb HHD in LE to allow for proper functional mobility as indicated by patients Functional Deficits. [] Progressing: [] Met: [] Not Met: [] Adjusted  3. Patient will return to standing and sitting functional activities without increased symptoms or restriction. [] Progressing: [] Met: [] Not Met: [] Adjusted  4. Patient will report being able to perform stairs without increased symptoms or restriction. (patient specific functional goal)    [] Progressing: [] Met: [] Not Met: [] Adjusted    ASSESSMENT: Patient with continued soft tissue restriction in adductors; however less than last session.  Hip IR continues with restriction- improved with mobilization. Did well with all strengthening. Improving control with step ups and improving eccentric control. Return to Play: (if applicable)   []  Stage 1: Intro to Strength   []  Stage 2: Return to Run and Strength   []  Stage 3: Return to Jump and Strength   []  Stage 4: Dynamic Strength and Agility   []  Stage 5: Sport Specific Training     []  Ready to Return to Play, Meets All Above Stages   []  Not Ready for Return to Sports   Comments:            Treatment/Activity Tolerance:  [x] Patient tolerated treatment well [] Patient limited by fatique  [] Patient limited by pain  [] Patient limited by other medical complications  [] Other:     Overall Progression Towards Functional goals/ Treatment Progress Update:  [] Patient is progressing as expected towards functional goals listed. [] Progression is slowed due to complexities/Impairments listed. [] Progression has been slowed due to co-morbidities. [x] Plan just implemented, too soon to assess goals progression <30days   [] Goals require adjustment due to lack of progress  [] Patient is not progressing as expected and requires additional follow up with physician  [] Other    Prognosis for POC: [x] Good [] Fair  [] Poor    Patient requires continued skilled intervention: [x] Yes  [] No        PLAN: See eval  [x] Continue per plan of care [] Alter current plan (see comments)  [] Plan of care initiated [] Hold pending MD visit [] Discharge    Electronically signed by: Naomie Dewey PTA    Note: If patient does not return for scheduled/recommended follow up visits, this note will serve as a discharge from care along with the most recent update on progress.

## 2022-01-27 ENCOUNTER — HOSPITAL ENCOUNTER (OUTPATIENT)
Dept: PHYSICAL THERAPY | Age: 67
Setting detail: THERAPIES SERIES
Discharge: HOME | End: 2022-01-27
Payer: MEDICARE

## 2022-01-27 PROCEDURE — 97140 MANUAL THERAPY 1/> REGIONS: CPT

## 2022-01-27 PROCEDURE — 97110 THERAPEUTIC EXERCISES: CPT

## 2022-01-27 NOTE — PLAN OF CARE
[address] [address] [place]  Phone: [phone] Fax: [phone]        Physical Therapy Re-Certification Plan of Care/MD UPDATE      Dear Referring Practitioner: Dr Nickolas Ruth,    We had the pleasure of treating the following patient for physical therapy services at [address] [place] [address]. A summary of our findings can be found in the updated assessment below. This includes our plan of care. If you have any questions or concerns regarding these findings, please do not hesitate to contact me at the office phone number checked above.   Thank you for the referral.     Physician Signature:________________________________Date:__________________  By signing above (or electronic signature), therapists plan is approved by physician    Date Range Of Visits: 10  Total Visits to Date: 2021 thru 2022  Overall Response to Treatment:   [x]Patient is responding well to treatment and improvement is noted with regards  to goals   []Patient should continue to improve in reasonable time if they continue HEP   []Patient has plateaued and is no longer responding to skilled PT intervention    []Patient is getting worse and would benefit from return to referring MD   []Patient unable to adhere to initial POC   []Other:           Physical Therapy Treatment Note/ Progress Report:     Date:  2022    Patient Name:  Kayley Ríos  \"CORNELIA\"  :  1955  MRN: [id number]  Restrictions/Precautions:    Medical/Treatment Diagnosis Information:  Diagnosis: medial meniscus tear, L knee pain  Treatment Diagnosis: L knee pain K86.269  Insurance/Certification information:  PT Insurance Information: [facility]  Physician Information:  Referring Practitioner: Dr Jefferson Munguia of care signed (Y/N):     Date of Patient follow up with Physician: not known      Progress Report: [x]  Yes  []  No     Date Range for reporting period:  Beginnin2021  Endin2022    Progress report due (10 Rx/or 30 days whichever is less):      Recertification due (POC duration/ or 90 days whichever is less): 2022    Visit # Insurance Allowable Auth Needed   10 (16 prior in year) [facility] (med guidelines) []Yes   [x]No     Latex Allergy:  [x]NO      []YES   Preferred Language for Healthcare:   [x]English       []other:  Functional Scale: LEFS 31.5% disability Date assessed:2022    Pain level:  1/10     SUBJECTIVE:  Pt reports that knee has been feeling pretty good overall. States that he has been a little sore last night and today, believes due to weather being colder. States that overall, feeling like knee is progressing well. Hips felt good after last session as well. OBJECTIVE:  bilat hips tight with IR today.     Observation:    Test measurements:           ROM LEFT RIGHT   HIP Flex 108 112   HIP Abd       HIP Ext       HIP IR 15 20   HIP ER 50 50   Knee ext 0 0   Knee Flex 130 125   Strength  LEFT RIGHT   HIP Flexors 22.4 27.5   HIP Abductors 25.9 30.6   HIP Ext 27.8 26.1   Hip ER       Knee EXT (quad) 44.5 49.0   Knee Flex (HS) 31.1 33.6         RESTRICTIONS/PRECAUTIONS: none    Exercises/Interventions:     Therapeutic Ex (92554)   Min:  20 min Sets/sec Reps Notes/CUES   Retro Stepper/BIKE    BOSU lunge- fwd and lateral 10 10 Cues form   SC     Wall sit- cues heels 5sec 20 80-90 degrees   Standing adductor stretch 30 4 left   Hamstring stretch 30 4 left   Supine hip adductor stretch with strap 0        Hip ER stretch 30 3 seated      4lb   SC  1 15 SBA   BOSU Lunge 5sec 8 bilat   Hip Abd with eccentric control to return 1 15 black   Hip Add with eccentric control 1 15 black   BOSU squat held     Leg Press Iso/Con/Ecc 0- 2 10 80 lb, 2 to 1   Cybex HS curl      TRX partial squat 1 15    Glute side walks 1 20 Orange @ shins, cues for form   Airex  @ wall 1 20 Cues for quad and glute control    Slide Lunge 5 10    Slide HS eccentrics      Step ups/ecc step down 1 10 6 in   Swissball wall rolls- in SLS- hip drive      Quad hip ext/wall-ball rolls                  Manual Intervention (06226)  Min: 23 min      Knee mobs/PROM 1 5 Rotational TF mobs, flexion mobs   SKTC glute stretch, adductor stretch 1 4    Patella Mobs 1 2    L hip belt mobs 1 6    IASTM 1 6 Medial joint line, L adductor   Prone lumbar PA/mobs 1 0    NMR re-education (38908)  Min:   CUES NEEDED   English/Biofeedback 10/10      BFR      G. Med activation      Hip Ext full ROM/ G. Activation      Bosu Bal and Prop- G Med      Single leg stance/Balance/Prop      Bosu Retro G. Med act      Prone Hip froggers- sliders/elevated            Therapeutic Activity (77881)  Min:      Ladders      Plyos      Dynamic Balance                    Therapeutic Exercise and NMR EXR  [x] (63733) Provided verbal/tactile cueing for activities related to strengthening, flexibility, endurance, ROM for improvements in LE, proximal hip, and core control with self care, mobility, lifting, ambulation. [x] (25172) Provided verbal/tactile cueing for activities related to improving balance, coordination, kinesthetic sense, posture, motor skill, proprioception  to assist with LE, proximal hip, and core control in self care, mobility, lifting, ambulation and eccentric single leg control.      NMR and Therapeutic Activities:    [x] (14763 or 93677) Provided verbal/tactile cueing for activities related to improving balance, coordination, kinesthetic sense, posture, motor skill, proprioception and motor activation to allow for proper function of core, proximal hip and LE with self care and ADLs and functional mobility.   [] (91557) Gait Re-education- Provided training and instruction to the patient for proper LE, core and proximal hip recruitment and positioning and eccentric body weight control with ambulation re-education including up and down stairs     Home Exercise Program:    [x] (56614) Reviewed/Progressed HEP activities related to strengthening, flexibility, endurance, ROM of core, proximal hip and LE for functional self-care, mobility, lifting and ambulation/stair navigation   [] (01611)Reviewed/Progressed HEP activities related to improving balance, coordination, kinesthetic sense, posture, motor skill, proprioception of core, proximal hip and LE for self care, mobility, lifting, and ambulation/stair navigation      Manual Treatments:  PROM / STM / Oscillations-Mobs:  G-I, II, III, IV (PA's, Inf., Post.)  [x] (16784) Provided manual therapy to mobilize LE, proximal hip and/or LS spine soft tissue/joints for the purpose of modulating pain, promoting relaxation,  increasing ROM, reducing/eliminating soft tissue swelling/inflammation/restriction, improving soft tissue extensibility and allowing for proper ROM for normal function with self care, mobility, lifting and ambulation. Modalities:     [] GAME READY (VASO)- for significant edema, swelling, pain control. Charges:  Timed Code Treatment Minutes: 43   Total Treatment Minutes: 43      [] EVAL (LOW) 33350 (typically 20 minutes face-to-face)  [] EVAL (MOD) 56736 (typically 30 minutes face-to-face)  [] EVAL (HIGH) 21216 (typically 45 minutes face-to-face)  [] RE-EVAL     [x] BJ(18963) x  1    [] DRY NEEDLE 1 OR 2 MUSCLES  [] NMR (11392) x     [] DRY NEEDLE 3+ MUSCLES  [x] Manual (41616) x 2      [] TA (30457) x     [] [facility] (02707[facility]  [] ES(attended) (15451)     [] ES (un) (50278):   [] VASO (05342)  [] Other:    If BW Please Indicate Time In/Out  CPT Code Time in Time out                                   GOALS:  Patient stated goal: reduce knee pain   [x] Progressing: [] Met: [] Not Met: [] Adjusted    Therapist goals for Patient:   Short Term Goals: To be achieved in: 2 weeks  1. Independent in HEP and progression per patient tolerance, in order to prevent re-injury.    [] Progressing: [x] Met: [] Not Met: [] Adjusted  2. Patient will have a decrease in pain to facilitate improvement in movement, function, and ADLs as indicated by Functional Deficits. [] Progressing: [x] Met: [] Not Met: [] Adjusted    Long Term Goals: To be achieved in: 6 weeks  1. Disability index score of 25% or less for the LEFS to assist with reaching prior level of function. [x] Progressing: [] Met: [] Not Met: [] Adjusted  2. Patient will demonstrate an increase in Strength to good proximal hip strength and control, within 5lb HHD in LE to allow for proper functional mobility as indicated by patients Functional Deficits. [x] Progressing: [] Met: [] Not Met: [] Adjusted  3. Patient will return to standing and sitting functional activities without increased symptoms or restriction. [] Progressing: [x] Met: [] Not Met: [] Adjusted  4. Patient will report being able to perform stairs without increased symptoms or restriction. (patient specific functional goal)    [] Progressing: [x] Met: [] Not Met: [] Adjusted    ASSESSMENT: Patient has been seen for 10 visits of physical therapy to address L knee pain and hip mobility. Patient with overall improving soft tissue restriction, ROM and strength of hips and knees. Patient progressing well and is improving with standing, sitting and stair management with overall decreased pain. Patient still needs some further strengthening and to address soft tissue as indicated. Improving control with step ups and improving eccentric control. Continue 1x week as indicated; will likely need 3-4 additional visits.        Return to Play: (if applicable)   []  Stage 1: Intro to Strength   []  Stage 2: Return to Run and Strength   []  Stage 3: Return to Jump and Strength   []  Stage 4: Dynamic Strength and Agility   []  Stage 5: Sport Specific Training     []  Ready to Return to Play, Meets All Above Stages   []  Not Ready for Return to Sports   Comments:            Treatment/Activity Tolerance:  [x] Patient tolerated treatment well [] Patient limited by fatique  [] Patient limited by pain  [] Patient limited by other medical complications  [] Other:     Overall Progression Towards Functional goals/ Treatment Progress Update:  [x] Patient is progressing as expected towards functional goals listed. [] Progression is slowed due to complexities/Impairments listed. [] Progression has been slowed due to co-morbidities. [] Plan just implemented, too soon to assess goals progression <30days   [] Goals require adjustment due to lack of progress  [] Patient is not progressing as expected and requires additional follow up with physician  [] Other    Prognosis for POC: [x] Good [] Fair  [] Poor    Patient requires continued skilled intervention: [x] Yes  [] No        PLAN:  [x] Continue per plan of care [] Alter current plan (see comments)  [] Plan of care initiated [] Hold pending MD visit [] Discharge    Electronically signed by: Lilia Kumar PT    Note: If patient does not return for scheduled/recommended follow up visits, this note will serve as a discharge from care along with the most recent update on progress.

## 2022-02-04 ENCOUNTER — APPOINTMENT (OUTPATIENT)
Dept: PHYSICAL THERAPY | Age: 67
End: 2022-02-04
Payer: MEDICARE

## 2022-02-10 ENCOUNTER — HOSPITAL ENCOUNTER (OUTPATIENT)
Dept: PHYSICAL THERAPY | Age: 67
Setting detail: THERAPIES SERIES
Discharge: HOME | End: 2022-02-10
Payer: MEDICARE

## 2022-02-10 PROCEDURE — 97110 THERAPEUTIC EXERCISES: CPT

## 2022-02-10 PROCEDURE — 97140 MANUAL THERAPY 1/> REGIONS: CPT

## 2022-02-10 NOTE — FLOWSHEET NOTE
[address] [place], [place] [address]Elis woo  Phone: [phone] Fax: [phone]        Physical Therapy Treatment Note/ Progress Report:     Date:  2/10/2022    Patient Name:  Griselda Edwards  \"CORNELIA\"  :  1955  MRN: [id number]  Restrictions/Precautions:    Medical/Treatment Diagnosis Information:  Diagnosis: medial meniscus tear, L knee pain  Treatment Diagnosis: L knee pain Y87.439  Insurance/Certification information:  PT Insurance Information: [facility]  Physician Information:  Referring Practitioner: Dr Yovana Conklin of care signed (Y/N):     Date of Patient follow up with Physician: not known      Progress Report: [x]  Yes  []  No     Date Range for reporting period:  Beginnin2021  Endin2022    Progress report due (10 Rx/or 30 days whichever is less):      Recertification due (POC duration/ or 90 days whichever is less): 2022    Visit # Insurance Allowable Auth Needed   11 (16 prior in year) [facility] (med guidelines) []Yes   [x]No     Latex Allergy:  [x]NO      []YES   Preferred Language for Healthcare:   [x]English       []other:  Functional Scale: LEFS 31.5% disability Date assessed:2022    Pain level:  1/10     SUBJECTIVE:  Pt reports that he shoveled snow the other day and knee was bothering his quite a bit. States that he did have a massage earlier in week and this helped some, but back is still really tender from this. Reports that knee isn't as sore today as it was a few days ago. OBJECTIVE:  bilat hips tight with IR today.     Observation:    Test measurements:           ROM LEFT RIGHT   HIP Flex 108 112   HIP Abd       HIP Ext       HIP IR 15 20   HIP ER 50 50   Knee ext 0 0   Knee Flex 130 125   Strength  LEFT RIGHT   HIP Flexors 22.4 27.5   HIP Abductors 25.9 30.6   HIP Ext 27.8 26.1   Hip ER       Knee EXT (quad) 44.5 49.0   Knee Flex (HS) 31.1 33.6 RESTRICTIONS/PRECAUTIONS: none    Exercises/Interventions:     Therapeutic Ex (77875)   Min:  20 min Sets/sec Reps Notes/CUES   Retro Stepper/BIKE    BOSU lunge- fwd and lateral 10 10 Cues form   SC march 2 20    Wall sit- cues heels 5sec 20 80-90 degrees   Standing adductor stretch 30 4 left   Hamstring stretch 30 4 left   Supine hip adductor stretch with strap 0        Hip ER stretch 30 3 seated      4lb   SC marches 4 15 SBA   BOSU Lunge 5sec 8 bilat   Hip Abd with eccentric control to return 1 15 black   Hip Add with eccentric control 1 15 black   BOSU squat held     Leg Press Iso/Con/Ecc 0- 2 10 80 lb, 2 to 1   Cybex HS curl      TRX partial squat 1 15    Glute side walks 1 20 Orange @ shins, cues for form   Airex Marches @ wall 1 20 Cues for quad and glute control    Slide Lunge 5 10    Slide HS eccentrics      Step ups/ecc step down 1 10 6 in   Swissball wall rolls- in SLS- hip drive      Quad hip ext/wall-ball rolls                  Manual Intervention (63491)  Min: 23 min      Knee mobs/PROM 1 5 Rotational TF mobs, flexion mobs   SKTC glute stretch, adductor stretch 1 4    Patella Mobs 1 2    L hip belt mobs 1 6    IASTM 1 6 Medial joint line, L adductor   Prone lumbar PA/mobs 1 0    NMR re-education (72947)  Min:   CUES NEEDED   Congolese/Biofeedback 10/10      BFR      G. Med activation      Hip Ext full ROM/ G. Activation      Bosu Bal and Prop- G Med      Single leg stance/Balance/Prop      Bosu Retro G. Med act      Prone Hip froggers- sliders/elevated            Therapeutic Activity (13070)  Min:      Ladders      Plyos      Dynamic Balance                    Therapeutic Exercise and NMR EXR  [x] (45271) Provided verbal/tactile cueing for activities related to strengthening, flexibility, endurance, ROM for improvements in LE, proximal hip, and core control with self care, mobility, lifting, ambulation.   [x] (23865) Provided verbal/tactile cueing for activities related to improving balance, face-to-face)  [] EVAL (MOD) 10186 (typically 30 minutes face-to-face)  [] EVAL (HIGH) 28560 (typically 45 minutes face-to-face)  [] RE-EVAL     [x] BG(74810) x  1    [] DRY NEEDLE 1 OR 2 MUSCLES  [] NMR (36732) x     [] DRY NEEDLE 3+ MUSCLES  [x] Manual (49330) x 2      [] TA (11179) x     [] Mech Traction (38572)  [] ES(attended) (51664)     [] ES (un) (59676):   [] VASO (94293)  [] Other:    If BWC Please Indicate Time In/Out  CPT Code Time in Time out                                   GOALS:  Patient stated goal: reduce knee pain   [x] Progressing: [] Met: [] Not Met: [] Adjusted    Therapist goals for Patient:   Short Term Goals: To be achieved in: 2 weeks  1. Independent in HEP and progression per patient tolerance, in order to prevent re-injury. [] Progressing: [x] Met: [] Not Met: [] Adjusted  2. Patient will have a decrease in pain to facilitate improvement in movement, function, and ADLs as indicated by Functional Deficits. [] Progressing: [x] Met: [] Not Met: [] Adjusted    Long Term Goals: To be achieved in: 6 weeks  1. Disability index score of 25% or less for the LEFS to assist with reaching prior level of function. [x] Progressing: [] Met: [] Not Met: [] Adjusted  2. Patient will demonstrate an increase in Strength to good proximal hip strength and control, within 5lb HHD in LE to allow for proper functional mobility as indicated by patients Functional Deficits. [x] Progressing: [] Met: [] Not Met: [] Adjusted  3. Patient will return to standing and sitting functional activities without increased symptoms or restriction. [] Progressing: [x] Met: [] Not Met: [] Adjusted  4.  Patient will report being able to perform stairs without increased symptoms or restriction. (patient specific functional goal)    [] Progressing: [x] Met: [] Not Met: [] Adjusted    ASSESSMENT: Patient with increased tenderness and soft tissue restriction in adductors today; tight in TF rotation and hip rotation today; good improvement with joint mobs with return to ROM of last session. Good overall tolerance to all exercises. Fatigued at conclusion. Patient still needs some further strengthening and to address soft tissue as indicated. Improving control with step ups and improving eccentric control. Continue 1x week as indicated; will likely need 3-4 additional visits. Return to Play: (if applicable)   []  Stage 1: Intro to Strength   []  Stage 2: Return to Run and Strength   []  Stage 3: Return to Jump and Strength   []  Stage 4: Dynamic Strength and Agility   []  Stage 5: Sport Specific Training     []  Ready to Return to Play, Meets All Above Stages   []  Not Ready for Return to Sports   Comments:            Treatment/Activity Tolerance:  [x] Patient tolerated treatment well [] Patient limited by fatique  [] Patient limited by pain  [] Patient limited by other medical complications  [] Other:     Overall Progression Towards Functional goals/ Treatment Progress Update:  [x] Patient is progressing as expected towards functional goals listed. [] Progression is slowed due to complexities/Impairments listed. [] Progression has been slowed due to co-morbidities. [] Plan just implemented, too soon to assess goals progression <30days   [] Goals require adjustment due to lack of progress  [] Patient is not progressing as expected and requires additional follow up with physician  [] Other    Prognosis for POC: [x] Good [] Fair  [] Poor    Patient requires continued skilled intervention: [x] Yes  [] No        PLAN:  [x] Continue per plan of care [] Alter current plan (see comments)  [] Plan of care initiated [] Hold pending MD visit [] Discharge    Electronically signed by: Pako Aguillon PT    Note: If patient does not return for scheduled/recommended follow up visits, this note will serve as a discharge from care along with the most recent update on progress.

## 2022-02-16 ENCOUNTER — HOSPITAL ENCOUNTER (OUTPATIENT)
Dept: PHYSICAL THERAPY | Age: 67
Setting detail: THERAPIES SERIES
Discharge: HOME | End: 2022-02-16
Payer: MEDICARE

## 2022-02-16 PROCEDURE — 97110 THERAPEUTIC EXERCISES: CPT

## 2022-02-16 PROCEDURE — 97140 MANUAL THERAPY 1/> REGIONS: CPT

## 2022-02-16 NOTE — FLOWSHEET NOTE
[address] [place], [place] [address]Elis  Phone: [phone] Fax: [phone]        Physical Therapy Treatment Note/ Progress Report:     Date:  2022    Patient Name:  Kati Dawson  \"CORNELIA\"  :  1955  MRN: [id number]  Restrictions/Precautions:    Medical/Treatment Diagnosis Information:  Diagnosis: medial meniscus tear, L knee pain  Treatment Diagnosis: L knee pain X48.767  Insurance/Certification information:  PT Insurance Information: [facility]  Physician Information:  Referring Practitioner: Dr Melisa Gardner of care signed (Y/N):     Date of Patient follow up with Physician: not known      Progress Report: [x]  Yes  []  No     Date Range for reporting period:  Beginnin2021  Endin2022    Progress report due (10 Rx/or 30 days whichever is less):      Recertification due (POC duration/ or 90 days whichever is less): 2022    Visit # Insurance Allowable Auth Needed   12 (16 prior in year) [facility] (med guidelines) []Yes   [x]No     Latex Allergy:  [x]NO      []YES   Preferred Language for Healthcare:   [x]English       []other:  Functional Scale: LEFS 31.5% disability Date assessed:2022    Pain level:  1/10     SUBJECTIVE:  Patient reports that his back was sore after last treatment. Feels that he may have overexerted himself on leg press. Knee is improving. States he was able to take a longer walk on Monday. OBJECTIVE:  bilat hips tight with IR today.     Observation:    Test measurements:           ROM LEFT RIGHT   HIP Flex 108 112   HIP Abd       HIP Ext       HIP IR 15 20   HIP ER 50 50   Knee ext 0 0   Knee Flex 130 125   Strength  LEFT RIGHT   HIP Flexors 22.4 27.5   HIP Abductors 25.9 30.6   HIP Ext 27.8 26.1   Hip ER       Knee EXT (quad) 44.5 49.0   Knee Flex (HS) 31.1 33.6         RESTRICTIONS/PRECAUTIONS: none    Exercises/Interventions:     Therapeutic Ex (09187) Min:  20 min Sets/sec Reps Notes/CUES   Retro Stepper/BIKE    BOSU lunge- fwd and lateral 10 10 Cues form   SC march 2 20    Wall sit- cues heels 5sec 20 80-90 degrees   Standing adductor stretch 30 4 left   Hamstring stretch 30 4 left   Supine hip adductor stretch with strap 0        Hip ER stretch 30 3 seated      4lb   SC marches 4 15 SBA   BOSU Lunge 5sec 8 bilat   Hip Abd with eccentric control to return 1 15 black   Hip Add with eccentric control 1 15 black   BOSU squat held     Leg Press Iso/Con/Ecc 0- 2 10 80 lb, 2 to 1   Cybex HS curl      TRX partial squat 1 15    Glute side walks 1 20 Orange @ shins, cues for form   Airex Marches @ wall 1 20 Cues for quad and glute control    Slide Lunge 5 10    Slide HS eccentrics      Step ups/ecc step down 1 10 6 in   Swissball wall rolls- in SLS- hip drive      Quad hip ext/wall-ball rolls                  Manual Intervention (69684)  Min: 23 min      Knee mobs/PROM 1 5 Rotational TF mobs, flexion mobs   SKTC glute stretch, adductor stretch 1 4    Patella Mobs 1 2    L hip belt mobs 1 6    IASTM 1 6 Medial joint line, L adductor   Prone lumbar PA/mobs 1 0    NMR re-education (73689)  Min:   CUES NEEDED   Macanese/Biofeedback 10/10      BFR      G. Med activation      Hip Ext full ROM/ G. Activation      Bosu Bal and Prop- G Med      Single leg stance/Balance/Prop      Bosu Retro G. Med act      Prone Hip froggers- sliders/elevated            Therapeutic Activity (41368)  Min:      Ladders      Plyos      Dynamic Balance                    Therapeutic Exercise and NMR EXR  [x] (54836) Provided verbal/tactile cueing for activities related to strengthening, flexibility, endurance, ROM for improvements in LE, proximal hip, and core control with self care, mobility, lifting, ambulation.   [x] (52180) Provided verbal/tactile cueing for activities related to improving balance, coordination, kinesthetic sense, posture, motor skill, proprioception  to assist with LE, proximal hip, and core control in self care, mobility, lifting, ambulation and eccentric single leg control. NMR and Therapeutic Activities:    [x] (93874 or 19426) Provided verbal/tactile cueing for activities related to improving balance, coordination, kinesthetic sense, posture, motor skill, proprioception and motor activation to allow for proper function of core, proximal hip and LE with self care and ADLs and functional mobility.   [] (64432) Gait Re-education- Provided training and instruction to the patient for proper LE, core and proximal hip recruitment and positioning and eccentric body weight control with ambulation re-education including up and down stairs     Home Exercise Program:    [x] (97395) Reviewed/Progressed HEP activities related to strengthening, flexibility, endurance, ROM of core, proximal hip and LE for functional self-care, mobility, lifting and ambulation/stair navigation   [] (73699)Reviewed/Progressed HEP activities related to improving balance, coordination, kinesthetic sense, posture, motor skill, proprioception of core, proximal hip and LE for self care, mobility, lifting, and ambulation/stair navigation      Manual Treatments:  PROM / STM / Oscillations-Mobs:  G-I, II, III, IV (PA's, Inf., Post.)  [x] (50662) Provided manual therapy to mobilize LE, proximal hip and/or LS spine soft tissue/joints for the purpose of modulating pain, promoting relaxation,  increasing ROM, reducing/eliminating soft tissue swelling/inflammation/restriction, improving soft tissue extensibility and allowing for proper ROM for normal function with self care, mobility, lifting and ambulation. Modalities:     [] GAME READY (VASO)- for significant edema, swelling, pain control.      Charges:  Timed Code Treatment Minutes: 43   Total Treatment Minutes: 43      [] EVAL (LOW) 38588 (typically 20 minutes face-to-face)  [] EVAL (MOD) 03711 (typically 30 minutes face-to-face)  [] EVAL (HIGH) 221 2104 (typically 45 minutes face-to-face)  [] RE-EVAL     [x] KM(57839) x  1    [] DRY NEEDLE 1 OR 2 MUSCLES  [] NMR (11541) x     [] DRY NEEDLE 3+ MUSCLES  [x] Manual (31058) x 2      [] TA (43942) x     [] Mech Traction (44346)  [] ES(attended) (19264)     [] ES (un) (16925):   [] VASO (93887)  [] Other:    If BWC Please Indicate Time In/Out  CPT Code Time in Time out                                   GOALS:  Patient stated goal: reduce knee pain   [x] Progressing: [] Met: [] Not Met: [] Adjusted    Therapist goals for Patient:   Short Term Goals: To be achieved in: 2 weeks  1. Independent in HEP and progression per patient tolerance, in order to prevent re-injury. [] Progressing: [x] Met: [] Not Met: [] Adjusted  2. Patient will have a decrease in pain to facilitate improvement in movement, function, and ADLs as indicated by Functional Deficits. [] Progressing: [x] Met: [] Not Met: [] Adjusted    Long Term Goals: To be achieved in: 6 weeks  1. Disability index score of 25% or less for the LEFS to assist with reaching prior level of function. [x] Progressing: [] Met: [] Not Met: [] Adjusted  2. Patient will demonstrate an increase in Strength to good proximal hip strength and control, within 5lb HHD in LE to allow for proper functional mobility as indicated by patients Functional Deficits. [x] Progressing: [] Met: [] Not Met: [] Adjusted  3. Patient will return to standing and sitting functional activities without increased symptoms or restriction. [] Progressing: [x] Met: [] Not Met: [] Adjusted  4. Patient will report being able to perform stairs without increased symptoms or restriction. (patient specific functional goal)    [] Progressing: [x] Met: [] Not Met: [] Adjusted    ASSESSMENT: Good tolerance to treatment. Appropriately fatigued at conclusion, no increased pain with any exercises in knee or low back. Cues for proper exercise performance.  Patient will benefit from some further strengthening and to address soft tissue as indicated. Improving control with step ups and improving eccentric control. Continue 1x week as indicated; will likely need 3-4 additional visits. Return to Play: (if applicable)   []  Stage 1: Intro to Strength   []  Stage 2: Return to Run and Strength   []  Stage 3: Return to Jump and Strength   []  Stage 4: Dynamic Strength and Agility   []  Stage 5: Sport Specific Training     []  Ready to Return to Play, Meets All Above Stages   []  Not Ready for Return to Sports   Comments:            Treatment/Activity Tolerance:  [x] Patient tolerated treatment well [] Patient limited by fatique  [] Patient limited by pain  [] Patient limited by other medical complications  [] Other:     Overall Progression Towards Functional goals/ Treatment Progress Update:  [x] Patient is progressing as expected towards functional goals listed. [] Progression is slowed due to complexities/Impairments listed. [] Progression has been slowed due to co-morbidities. [] Plan just implemented, too soon to assess goals progression <30days   [] Goals require adjustment due to lack of progress  [] Patient is not progressing as expected and requires additional follow up with physician  [] Other    Prognosis for POC: [x] Good [] Fair  [] Poor    Patient requires continued skilled intervention: [x] Yes  [] No        PLAN:  [x] Continue per plan of care [] Alter current plan (see comments)  [] Plan of care initiated [] Hold pending MD visit [] Discharge    Electronically signed by: Michael Christianson PTA    Note: If patient does not return for scheduled/recommended follow up visits, this note will serve as a discharge from care along with the most recent update on progress.

## 2022-02-24 ENCOUNTER — APPOINTMENT (OUTPATIENT)
Dept: PHYSICAL THERAPY | Age: 67
End: 2022-02-24
Payer: MEDICARE

## 2022-03-02 ENCOUNTER — HOSPITAL ENCOUNTER (OUTPATIENT)
Dept: PHYSICAL THERAPY | Age: 67
Setting detail: THERAPIES SERIES
Discharge: HOME | End: 2022-03-02
Payer: MEDICARE

## 2022-03-02 PROCEDURE — 97140 MANUAL THERAPY 1/> REGIONS: CPT

## 2022-03-02 PROCEDURE — 97110 THERAPEUTIC EXERCISES: CPT

## 2022-03-02 NOTE — FLOWSHEET NOTE
[address]  Phone: [phone] Fax: [phone]        Physical Therapy Treatment Note/ Progress Report:     Date:  3/2/2022    Patient Name:  Kati Dawson  \"CORNELIA\"  :  1955  MRN: [id number]  Restrictions/Precautions:    Medical/Treatment Diagnosis Information:  Diagnosis: medial meniscus tear, L knee pain  Treatment Diagnosis: L knee pain O41.068  Insurance/Certification information:  PT Insurance Information: [facility]  Physician Information:  Referring Practitioner: Dr Vincent Flavors of care signed (Y/N):     Date of Patient follow up with Physician: not known      Progress Report: [x]  Yes  []  No     Date Range for reporting period:  Beginnin2021  Endin2022    Progress report due (10 Rx/or 30 days whichever is less):      Recertification due (POC duration/ or 90 days whichever is less): 2022    Visit # Insurance Allowable Auth Needed   13 (16 prior in year) [facility] (med guidelines) []Yes   [x]No     Latex Allergy:  [x]NO      []YES   Preferred Language for Healthcare:   [x]English       []other:  Functional Scale: LEFS 31.5% disability Date assessed:2022    Pain level:  1/10     SUBJECTIVE:  Patient reports that his knee is doing well. States that he actually feels better when he does more stairs or walking. OBJECTIVE:  bilat hips tight with IR today.     Observation:    Test measurements:           ROM LEFT RIGHT   HIP Flex 108 112   HIP Abd       HIP Ext       HIP IR 15 20   HIP ER 50 50   Knee ext 0 0   Knee Flex 130 125   Strength  LEFT RIGHT   HIP Flexors 22.4 27.5   HIP Abductors 25.9 30.6   HIP Ext 27.8 26.1   Hip ER       Knee EXT (quad) 44.5 49.0   Knee Flex (HS) 31.1 33.6         RESTRICTIONS/PRECAUTIONS: none    Exercises/Interventions:     Therapeutic Ex (68875)   Min:  20 min Sets/sec Reps Notes/CUES   Retro Stepper/BIKE    BOSU lunge- fwd and lateral 10 10 Cues form   SC march 2 20    Wall sit- cues heels 5sec 20 80-90 degrees   Standing adductor stretch 30 4 left   Hamstring stretch 30 4 left   Supine hip adductor stretch with strap 0        Hip ER stretch 30 3 seated      4lb   SC marches 4 15 SBA   BOSU Lunge 5sec 8 bilat   Hip Abd with eccentric control to return 1 15 black   Hip Add with eccentric control 1 15 black   BOSU squat held     Leg Press Iso/Con/Ecc 0- 2 10 80 lb, 2 to 1   Cybex HS curl      TRX partial squat 1 15    Glute side walks 1 20 Orange @ shins, cues for form   Airex Marches @ wall 1 20 Cues for quad and glute control    Slide Lunge 5 10    Slide HS eccentrics      Step ups/ecc step down 1 10 6 in   Swissball wall rolls- in SLS- hip drive      Quad hip ext/wall-ball rolls                  Manual Intervention (34480)  Min: 23 min      Knee mobs/PROM 1 5 Rotational TF mobs, flexion mobs   SKTC glute stretch, adductor stretch 1 4    Patella Mobs 1 2    L hip belt mobs 1 6    IASTM 1 6 Medial joint line, L adductor   Prone lumbar PA/mobs 1 0    NMR re-education (54202)  Min:   CUES NEEDED   Mozambican/Biofeedback 10/10      BFR      G. Med activation      Hip Ext full ROM/ G. Activation      Bosu Bal and Prop- G Med      Single leg stance/Balance/Prop      Bosu Retro G. Med act      Prone Hip froggers- sliders/elevated            Therapeutic Activity (46253)  Min:      Ladders      Plyos      Dynamic Balance                    Therapeutic Exercise and NMR EXR  [x] (98609) Provided verbal/tactile cueing for activities related to strengthening, flexibility, endurance, ROM for improvements in LE, proximal hip, and core control with self care, mobility, lifting, ambulation.   [x] (62203) Provided verbal/tactile cueing for activities related to improving balance, coordination, kinesthetic sense, posture, motor skill, proprioception  to assist with LE, proximal hip, and core control in self care, mobility, lifting, ambulation and eccentric single leg control. NMR and Therapeutic Activities:    [x] (44406 or 33641) Provided verbal/tactile cueing for activities related to improving balance, coordination, kinesthetic sense, posture, motor skill, proprioception and motor activation to allow for proper function of core, proximal hip and LE with self care and ADLs and functional mobility.   [] (48523) Gait Re-education- Provided training and instruction to the patient for proper LE, core and proximal hip recruitment and positioning and eccentric body weight control with ambulation re-education including up and down stairs     Home Exercise Program:    [x] (12191) Reviewed/Progressed HEP activities related to strengthening, flexibility, endurance, ROM of core, proximal hip and LE for functional self-care, mobility, lifting and ambulation/stair navigation   [] (37360)Reviewed/Progressed HEP activities related to improving balance, coordination, kinesthetic sense, posture, motor skill, proprioception of core, proximal hip and LE for self care, mobility, lifting, and ambulation/stair navigation      Manual Treatments:  PROM / STM / Oscillations-Mobs:  G-I, II, III, IV (PA's, Inf., Post.)  [x] (36203) Provided manual therapy to mobilize LE, proximal hip and/or LS spine soft tissue/joints for the purpose of modulating pain, promoting relaxation,  increasing ROM, reducing/eliminating soft tissue swelling/inflammation/restriction, improving soft tissue extensibility and allowing for proper ROM for normal function with self care, mobility, lifting and ambulation. Modalities:     [] GAME READY (VASO)- for significant edema, swelling, pain control.      Charges:  Timed Code Treatment Minutes: 43   Total Treatment Minutes: 43      [] EVAL (LOW) 10049 (typically 20 minutes face-to-face)  [] EVAL (MOD) 50529 (typically 30 minutes face-to-face)  [] EVAL (HIGH) 85936 (typically 45 minutes face-to-face)  [] RE-EVAL     [x] BHAVIK(46784) x  1    [] DRY NEEDLE 1 OR 2 MUSCLES  [] NMR (25403) x     [] DRY NEEDLE 3+ MUSCLES  [x] Manual (65889) x 2      [] TA (67696) x     [] Mech Traction (52150)  [] ES(attended) (77942)     [] ES (un) (40026):   [] VASO (52489)  [] Other:    If [facility] Please Indicate Time In/Out  CPT Code Time in Time out                                   GOALS:  Patient stated goal: reduce knee pain   [x] Progressing: [] Met: [] Not Met: [] Adjusted    Therapist goals for Patient:   Short Term Goals: To be achieved in: 2 weeks  1. Independent in HEP and progression per patient tolerance, in order to prevent re-injury. [] Progressing: [x] Met: [] Not Met: [] Adjusted  2. Patient will have a decrease in pain to facilitate improvement in movement, function, and ADLs as indicated by Functional Deficits. [] Progressing: [x] Met: [] Not Met: [] Adjusted    Long Term Goals: To be achieved in: 6 weeks  1. Disability index score of 25% or less for the LEFS to assist with reaching prior level of function. [x] Progressing: [] Met: [] Not Met: [] Adjusted  2. Patient will demonstrate an increase in Strength to good proximal hip strength and control, within 5lb HHD in LE to allow for proper functional mobility as indicated by patients Functional Deficits. [x] Progressing: [] Met: [] Not Met: [] Adjusted  3. Patient will return to standing and sitting functional activities without increased symptoms or restriction. [] Progressing: [x] Met: [] Not Met: [] Adjusted  4. Patient will report being able to perform stairs without increased symptoms or restriction. (patient specific functional goal)    [] Progressing: [x] Met: [] Not Met: [] Adjusted    ASSESSMENT: Good tolerance to treatment today. Appropriately fatigued at conclusion. No pain with any activities. Cues required for glute recruitment with squatting and lunging exercises.      Return to Play: (if applicable)   []  Stage 1: Intro to Strength   []  Stage 2: Return to Run and Strength   []  Stage 3: Return to Jump and Strength   []  Stage 4: Dynamic Strength and Agility   []  Stage 5: Sport Specific Training     []  Ready to Return to Play, Meets All Above Stages   []  Not Ready for Return to Sports   Comments:            Treatment/Activity Tolerance:  [x] Patient tolerated treatment well [] Patient limited by fatique  [] Patient limited by pain  [] Patient limited by other medical complications  [] Other:     Overall Progression Towards Functional goals/ Treatment Progress Update:  [x] Patient is progressing as expected towards functional goals listed. [] Progression is slowed due to complexities/Impairments listed. [] Progression has been slowed due to co-morbidities. [] Plan just implemented, too soon to assess goals progression <30days   [] Goals require adjustment due to lack of progress  [] Patient is not progressing as expected and requires additional follow up with physician  [] Other    Prognosis for POC: [x] Good [] Fair  [] Poor    Patient requires continued skilled intervention: [x] Yes  [] No        PLAN:  [x] Continue per plan of care [] Alter current plan (see comments)  [] Plan of care initiated [] Hold pending MD visit [] Discharge    Electronically signed by: Claudette Kumar PTA    Note: If patient does not return for scheduled/recommended follow up visits, this note will serve as a discharge from care along with the most recent update on progress.

## 2022-03-08 ENCOUNTER — HOSPITAL ENCOUNTER (OUTPATIENT)
Dept: PHYSICAL THERAPY | Age: 67
Setting detail: THERAPIES SERIES
Discharge: HOME | End: 2022-03-08
Payer: MEDICARE

## 2022-03-08 PROCEDURE — 97110 THERAPEUTIC EXERCISES: CPT

## 2022-03-08 NOTE — PLAN OF CARE
[place] [address] [address]  Phone: [phone] Fax: [phone]     Physical Therapy Discharge Summary    Dear Referring Practitioner: Dr Kannan Zapata,    We had the pleasure of treating the following patient for physical therapy services at [address] [place] [address]. A summary of our findings can be found in the discharge summary below. If you have any questions or concerns regarding these findings, please do not hesitate to contact me at the office phone number above.   Thank you for the referral.     Physician Signature:________________________________Date:__________________  By signing above (or electronic signature), therapists plan is approved by physician      Overall Response to Treatment:   []Patient is responding well to treatment and improvement is noted with regards  to goals   [x]Patient should continue to improve in reasonable time if they continue HEP   []Patient has plateaued and is no longer responding to skilled PT intervention    []Patient is getting worse and would benefit from return to referring MD   []Patient unable to adhere to initial POC   []Other:     Date range of Visits: 21 thru 3/8/2022  Total Visits: 14          Physical Therapy Treatment Note/ Progress Report:     Date:  3/8/2022    Patient Name:  Shane Small  \"CORNELIA\"  :  1955  MRN: [id number]  Restrictions/Precautions:    Medical/Treatment Diagnosis Information:  Diagnosis: medial meniscus tear, L knee pain  Treatment Diagnosis: L knee pain S61.659  Insurance/Certification information:  PT Insurance Information: [facility]  Physician Information:  Referring Practitioner: Dr Brayan Thompson of care signed (Y/N):     Date of Patient follow up with Physician: not known      Progress Report: [x]  Yes  []  No     Date Range for reporting period:  Beginnin2022  Ending:  3/63781    Progress report due (10 Rx/or 30 days whichever is less): 7/15/4317     Recertification due (POC duration/ or 90 days whichever is less): 2/27/2022    Visit # Insurance Allowable Auth Needed   14 (16 prior in year) Medicare/[facility] (med guidelines) []Yes   [x]No     Latex Allergy:  [x]NO      []YES   Preferred Language for Healthcare:   [x]English       []other:  Functional Scale: LEFS 28.75% disability Date assessed:3/8/2022    Pain level:  0/10     SUBJECTIVE:  Patient reports that his knee is doing well. Feels like he is back to his normal with the knee and is ready to d/c from PT at this time. Does note that he has been having more discomfort in his back. Will be calling the doc that has done is ablation in his back. OBJECTIVE:  .     Observation:    Test measurements:       ROM LEFT RIGHT   HIP Flex 105 105   HIP Abd       HIP Ext       HIP IR 20 20   HIP ER 50 50   Knee ext 0 0   Knee Flex 132 125   Strength  LEFT RIGHT   HIP Flexors 32.0 29.8   HIP Abductors 28.0 30.6   HIP Ext 31.2 33.8   Hip ER       Knee EXT (quad) 44.8 46.4   Knee Flex (HS) 33.3 34.2       RESTRICTIONS/PRECAUTIONS: none    Exercises/Interventions:     Therapeutic Ex (00519)   Min:  30 min Sets/sec Reps Notes/CUES   Retro Stepper/BIKE    BOSU lunge- fwd and lateral 10 10 Cues form   SC march 2 20    Wall sit- cues heels 5sec 20 80-90 degrees   Standing adductor stretch 30 4 left   Hamstring stretch 30 4 left   Supine hip adductor stretch with strap 0        Hip ER stretch 30 3 seated      4lb   SC marches 4 15 SBA   BOSU Lunge 5sec 8 bilat   Hip Abd with eccentric control to return 1 15 black   Hip Add with eccentric control 1 15 black   BOSU squat held     Leg Press Iso/Con/Ecc 0- 2 10 80 lb, 2 to 1   Cybex HS curl      TRX partial squat 0     Glute side walks 1 20 Orange @ shins, cues for form   Airex Marches @ wall 0  Cues for quad and glute control    Slide Lunge 5 10    Slide HS eccentrics      Step ups/ecc step down 0  6 in   Swissball wall rolls- in SLS- hip drive Quad hip ext/wall-ball rolls                  Manual Intervention (43113)  Min: 0 min      Knee mobs/PROM 1 5 Rotational TF mobs, flexion mobs   SKTC glute stretch, adductor stretch 1 4    Patella Mobs 1 2    L hip belt mobs 1 6    IASTM 1 6 Medial joint line, L adductor   Prone lumbar PA/mobs 1 0    NMR re-education (71994)  Min:   CUES NEEDED   Polish/Biofeedback 10/10      BFR      G. Med activation      Hip Ext full ROM/ G. Activation      Bosu Bal and Prop- G Med      Single leg stance/Balance/Prop      Bosu Retro G. Med act      Prone Hip froggers- sliders/elevated            Therapeutic Activity (60806)  Min:      Ladders      Plyos      Dynamic Balance                    Therapeutic Exercise and NMR EXR  [x] (06099) Provided verbal/tactile cueing for activities related to strengthening, flexibility, endurance, ROM for improvements in LE, proximal hip, and core control with self care, mobility, lifting, ambulation. [x] (57669) Provided verbal/tactile cueing for activities related to improving balance, coordination, kinesthetic sense, posture, motor skill, proprioception  to assist with LE, proximal hip, and core control in self care, mobility, lifting, ambulation and eccentric single leg control.      NMR and Therapeutic Activities:    [x] (18032 or 73932) Provided verbal/tactile cueing for activities related to improving balance, coordination, kinesthetic sense, posture, motor skill, proprioception and motor activation to allow for proper function of core, proximal hip and LE with self care and ADLs and functional mobility.   [] (99589) Gait Re-education- Provided training and instruction to the patient for proper LE, core and proximal hip recruitment and positioning and eccentric body weight control with ambulation re-education including up and down stairs     Home Exercise Program:    [x] (86483) Reviewed/Progressed HEP activities related to strengthening, flexibility, endurance, ROM of core, proximal hip and LE for functional self-care, mobility, lifting and ambulation/stair navigation   [] (29159)Reviewed/Progressed HEP activities related to improving balance, coordination, kinesthetic sense, posture, motor skill, proprioception of core, proximal hip and LE for self care, mobility, lifting, and ambulation/stair navigation      Manual Treatments:  PROM / STM / Oscillations-Mobs:  G-I, II, III, IV (PA's, Inf., Post.)  [x] (14399) Provided manual therapy to mobilize LE, proximal hip and/or LS spine soft tissue/joints for the purpose of modulating pain, promoting relaxation,  increasing ROM, reducing/eliminating soft tissue swelling/inflammation/restriction, improving soft tissue extensibility and allowing for proper ROM for normal function with self care, mobility, lifting and ambulation. Modalities:     [] GAME READY (VASO)- for significant edema, swelling, pain control. Charges:  Timed Code Treatment Minutes: 30   Total Treatment Minutes: 30      [] EVAL (LOW) 81385 (typically 20 minutes face-to-face)  [] EVAL (MOD) 96259 (typically 30 minutes face-to-face)  [] EVAL (HIGH) 06552 (typically 45 minutes face-to-face)  [] RE-EVAL     [x] EX(03850) x  2    [] DRY NEEDLE 1 OR 2 MUSCLES  [] NMR (14897) x     [] DRY NEEDLE 3+ MUSCLES  [] Manual (59245) x       [] TA (82064) x     [] Mech Traction (50651)  [] ES(attended) (55120)     [] ES (un) (13541):   [] VASO (18823)  [] Other:    If [facility] Please Indicate Time In/Out  CPT Code Time in Time out                                   GOALS:  Patient stated goal: reduce knee pain   [] Progressing: [x] Met: [] Not Met: [] Adjusted    Therapist goals for Patient:   Short Term Goals: To be achieved in: 2 weeks  1. Independent in HEP and progression per patient tolerance, in order to prevent re-injury. [] Progressing: [x] Met: [] Not Met: [] Adjusted  2.  Patient will have a decrease in pain to facilitate improvement in movement, function, and ADLs as indicated by Functional Deficits. [] Progressing: [x] Met: [] Not Met: [] Adjusted    Long Term Goals: To be achieved in: 6 weeks  1. Disability index score of 25% or less for the LEFS to assist with reaching prior level of function. (28.75% disability at d/c)  [] Progressing: [] Met: [x] Not Met: [] Adjusted  2. Patient will demonstrate an increase in Strength to good proximal hip strength and control, within 5lb HHD in LE to allow for proper functional mobility as indicated by patients Functional Deficits. [] Progressing: [x] Met: [] Not Met: [] Adjusted  3. Patient will return to standing and sitting functional activities without increased symptoms or restriction. [] Progressing: [x] Met: [] Not Met: [] Adjusted  4. Patient will report being able to perform stairs without increased symptoms or restriction. (patient specific functional goal)    [] Progressing: [x] Met: [] Not Met: [] Adjusted    ASSESSMENT: Patient has been seen for 14 visits of physical therapy to address L knee pain. Patient has made good improvements in regards to reduction of pain, ROM and strength of knee and proximal hip. Patient reporting return to his PLOF. Has met nearly all goals. Ready to d/c at this time. Return to Play: (if applicable). []  Stage 1: Intro to Strength   []  Stage 2: Return to Run and Strength   []  Stage 3: Return to Jump and Strength   []  Stage 4: Dynamic Strength and Agility   []  Stage 5: Sport Specific Training     []  Ready to Return to Play, Meets All Above Stages   []  Not Ready for Return to Sports   Comments:            Treatment/Activity Tolerance:  [x] Patient tolerated treatment well [] Patient limited by fatique  [] Patient limited by pain  [] Patient limited by other medical complications  [] Other:     Overall Progression Towards Functional goals/ Treatment Progress Update:  [x] Patient is progressing as expected towards functional goals listed.     [] Progression is slowed due to complexities/Impairments listed. [] Progression has been slowed due to co-morbidities. [] Plan just implemented, too soon to assess goals progression <30days   [] Goals require adjustment due to lack of progress  [] Patient is not progressing as expected and requires additional follow up with physician  [] Other    Prognosis for POC: [x] Good [] Fair  [] Poor    Patient requires continued skilled intervention: [x] Yes  [] No        PLAN: d/c from skilled PT services. Patient educated to continue with stretching and strengthening to prevent re-injury. [] Continue per plan of care [] Alter current plan (see comments)  [] Plan of care initiated [] Hold pending MD visit [x] Discharge    Electronically signed by: Servando Fisher PT    Note: If patient does not return for scheduled/recommended follow up visits, this note will serve as a discharge from care along with the most recent update on progress.

## 2022-10-27 ENCOUNTER — HOSPITAL ENCOUNTER (OUTPATIENT)
Dept: PHYSICAL THERAPY | Age: 67
Setting detail: THERAPIES SERIES
Discharge: HOME | End: 2022-10-27
Payer: MEDICARE

## 2022-10-27 PROCEDURE — 97140 MANUAL THERAPY 1/> REGIONS: CPT

## 2022-10-27 PROCEDURE — 97161 PT EVAL LOW COMPLEX 20 MIN: CPT

## 2022-10-27 NOTE — FLOWSHEET NOTE
[address] [address] [address]  Phone: [phone] Fax: [phone]    Physical Therapy Treatment Note/ Progress Report:       Date:  10/27/2022    Patient Name:  Chiki Montoya    :  1955  MRN: [id number]  Restrictions/Precautions:    Medical/Treatment Diagnosis Information:  Diagnosis: L knee pain, L knee MMT  Treatment Diagnosis: L knee MMT S83.242A, L knee pain Z52.926  Insurance/Certification information:  PT Insurance Information: Medicare/AARP  Physician Information:  Aurelia De La Vega[address] [facility] signed (Y/N):     Date of Patient follow up with Physician:      Progress Report: [x]  Yes  []  No     Date Range for reporting period:  Beginning: 10/27/2022  Ending:     Progress report due (10 Rx/or 30 days whichever is less):      Recertification due (POC duration/ or 90 days whichever is less): 2022     Visit # Insurance Allowable Auth Needed   1 Medicare/AARP []Yes    []No     Latex Allergy:  [x]NO      []YES  Preferred Language for Healthcare:   [x]English       []other:  Functional Scale:  Foto 56 , LEFS 43.9 Date assessed:10/27/2022    Pain level:  3-6/10     SUBJECTIVE:  See eval    OBJECTIVE: See eval  Observation:   Test measurements:      RESTRICTIONS/PRECAUTIONS: none    Exercises/Interventions:     Therapeutic Ex (94407)   Min: Sets/sec Reps Notes/CUES   Retro Stepper/BIKE      Alter G      BFR      Sportcord March      3 way SLR      SAQ      Clam ABD      Hip Ext /table      BOSU fwd/side lunge 10 10 bilat   BOSU squat      Leg Press Iso/Con/Ecc 0- 5sec 10 Bilat, 2 to 1 eccentrics, 50 lb   Cybex HS curl      TKE      Glute side walks      RDL      Slide Lunge      Slide HS eccentrics      Step ups/ecc step down      Swissball wall rolls- in SLS- hip drive      Quad hip ext/wall-ball rolls                  Manual Intervention (69863)  Min: 15      Knee mobs/PROM      Tib/Fem Mobs Patella Mobs      Ankle mobs      STM adductors, medial joint line, medial hamstring  15          NMR re-education (50326)  Min:   CUES NEEDED   Peruvian/Biofeedback 10/10      BFR      G. Med activation      Hip Ext full ROM/ G. Activation      Bosu Bal and Prop- G Med      Single leg stance/Balance/Prop      Bosu Retro G. Med act      Prone Hip froggers- sliders/elevated            Therapeutic Activity (87927)  Min:      Ladders      Plyos      Dynamic Balance                            Therapeutic Exercise and NMR EXR  [x] (82061) Provided verbal/tactile cueing for activities related to strengthening, flexibility, endurance, ROM for improvements in LE, proximal hip, and core control with self care, mobility, lifting, ambulation. [x] (88089) Provided verbal/tactile cueing for activities related to improving balance, coordination, kinesthetic sense, posture, motor skill, proprioception  to assist with LE, proximal hip, and core control in self care, mobility, lifting, ambulation and eccentric single leg control.      NMR and Therapeutic Activities:    [x] (44689 or 72458) Provided verbal/tactile cueing for activities related to improving balance, coordination, kinesthetic sense, posture, motor skill, proprioception and motor activation to allow for proper function of core, proximal hip and LE with self care and ADLs and functional mobility.   [] (64003) Gait Re-education- Provided training and instruction to the patient for proper LE, core and proximal hip recruitment and positioning and eccentric body weight control with ambulation re-education including up and down stairs     Home Exercise Program:    [x] (38916) Reviewed/Progressed HEP activities related to strengthening, flexibility, endurance, ROM of core, proximal hip and LE for functional self-care, mobility, lifting and ambulation/stair navigation   [] (07462)Reviewed/Progressed HEP activities related to improving balance, coordination, kinesthetic sense, posture, motor skill, proprioception of core, proximal hip and LE for self care, mobility, lifting, and ambulation/stair navigation      Manual Treatments:  PROM / STM / Oscillations-Mobs:  G-I, II, III, IV (PA's, Inf., Post.)  [x] (19243) Provided manual therapy to mobilize LE, proximal hip and/or LS spine soft tissue/joints for the purpose of modulating pain, promoting relaxation,  increasing ROM, reducing/eliminating soft tissue swelling/inflammation/restriction, improving soft tissue extensibility and allowing for proper ROM for normal function with self care, mobility, lifting and ambulation. Modalities:     [] GAME READY (VASO)- for significant edema, swelling, pain control. Charges:  Timed Code Treatment Minutes: 20   Total Treatment Minutes: 45      [x] EVAL (LOW) 83598 (typically 20 minutes face-to-face)  [] EVAL (MOD) 45469 (typically 30 minutes face-to-face)  [] EVAL (HIGH) 02019 (typically 45 minutes face-to-face)  [] RE-EVAL     [] QE(67088) x     [] DRY NEEDLE 1 OR 2 MUSCLES  [] NMR (46030) x     [] DRY NEEDLE 3+ MUSCLES  [x] Manual (94148) x   1    [] TA (45254) x     [] Mech Traction (43958)  [] ES(attended) (87415)     [] ES (un) (20449):   [] VASO (96195)  [] Other:    If [facility] Please Indicate Time In/Out  CPT Code Time in Time out                                   GOALS:  Patient stated goal: painfree during sitting and driving  [] Progressing: [] Met: [] Not Met: [] Adjusted    Therapist goals for Patient:   Short Term Goals: To be achieved in: 2 weeks  1. Independent in HEP and progression per patient tolerance, in order to prevent re-injury. [] Progressing: [] Met: [] Not Met: [] Adjusted  2. Patient will have a decrease in pain to facilitate improvement in movement, function, and ADLs as indicated by Functional Deficits. [] Progressing: [] Met: [] Not Met: [] Adjusted    Long Term Goals: To be achieved in: 8 weeks  1.  Disability index score of 68 or better for Foto and 50/80 or better for the LEFS to assist with reaching prior level of function. [] Progressing: [] Met: [] Not Met: [] Adjusted  2. Patient will demonstrate increased AROM to L hip IR to 20 degrees to allow for proper joint functioning as indicated by patients Functional Deficits. [] Progressing: [] Met: [] Not Met: [] Adjusted  3. Patient will demonstrate an increase in Strength to good proximal hip strength and control, within 5lb HHD in LE to allow for proper functional mobility as indicated by patients Functional Deficits. [] Progressing: [] Met: [] Not Met: [] Adjusted  4. Patient will return to sitting work and driving activities without increased symptoms or restriction. [] Progressing: [] Met: [] Not Met: [] Adjusted  5. Patient will report decreased pain with standing and walking dogs. (patient specific functional goal)    [] Progressing: [] Met: [] Not Met: [] Adjusted     ASSESSMENT:  See eval    Return to Play: (if applicable)   []  Stage 1: Intro to Strength   []  Stage 2: Return to Run and Strength   []  Stage 3: Return to Jump and Strength   []  Stage 4: Dynamic Strength and Agility   []  Stage 5: Sport Specific Training     []  Ready to Return to Play, Meets All Above Stages   []  Not Ready for Return to Sports   Comments:            Treatment/Activity Tolerance:  [x] Patient tolerated treatment well [] Patient limited by fatique  [] Patient limited by pain  [] Patient limited by other medical complications  [] Other:     Overall Progression Towards Functional goals/ Treatment Progress Update:  [] Patient is progressing as expected towards functional goals listed. [] Progression is slowed due to complexities/Impairments listed. [] Progression has been slowed due to co-morbidities.   [x] Plan just implemented, too soon to assess goals progression <30days   [] Goals require adjustment due to lack of progress  [] Patient is not progressing as expected and requires additional follow up with physician  [] Other    Prognosis for POC: [x] Good [] Fair  [] Poor    Patient requires continued skilled intervention: [x] Yes  [] No        PLAN: See eval  [] Continue per plan of care [] Alter current plan (see comments)  [x] Plan of care initiated [] Hold pending MD visit [] Discharge    Electronically signed by: Joi Hollingsworth PT    Note: If patient does not return for scheduled/recommended follow up visits, this note will serve as a discharge from care along with the most recent update on progress.

## 2022-10-27 NOTE — PLAN OF CARE
sit for prolonged periods with driving, working or when watching tv. Can feel with walking, but doesn't limit him at this point. Currently out of celebrex. Has history of L knee meniscal tear. Relevant Medical History:n/a  Functional Scale/Score: Foto 56 , LEFS 43.9 Date assessed:10/27/2022    Pain Scale: 5-6/10 prolonged sitting, 2-3/10 with long walking  Easing factors: propping leg, icing, medication/compound cream  Provocative factors: sitting for long periods     Type: []Constant   [x]Intermittent  []Radiating []Localized []other:     Numbness/Tingling: none    Occupation/School:     Living Status/Prior Level of Function: Independent with ADLs and IADLs, walk dogs    OBJECTIVE:     ROM LEFT RIGHT   HIP Flex 105 110   HIP Abd     HIP Ext     HIP IR 10 18   HIP ER 45 48   Knee ext 0 0   Knee Flex 135 135   Strength  LEFT RIGHT   HIP Flexors 13.9 18.5   HIP Abductors 24.0 21.3   HIP Ext 19.9 19.5   Hip ER     Knee EXT (quad) 19.8 32.0   Knee Flex (HS) 21.8 26.9     Reflexes/Sensation:    [x]Dermatomes/Myotomes intact    [x]Reflexes equal and normal bilaterally   []Other:    Joint mobility: L hip   []Normal    [x]Hypo   []Hyper    Palpation: tender along L adductors, pes anserine, L medial hamstring    Functional Mobility/Transfers: limited in sitting for prolonged periods, limited in standing/walking    Posture: WNL    Bandages/Dressings/Incisions: n/a    Gait: (include devices/WB status) WNL    Orthopedic Special Tests: n/a                       [x] Patient history, allergies, meds reviewed. Medical chart reviewed. See intake form. Review Of Systems (ROS):  [x]Performed Review of systems (Integumentary, CardioPulmonary, Neurological) by intake and observation. Intake form has been scanned into medical record. Patient has been instructed to contact their primary care physician regarding ROS issues if not already being addressed at this time.       Co-morbidities/Complexities (which will affect course of rehabilitation):   []None           Arthritic conditions   []Rheumatoid arthritis (M05.9)  [x]Osteoarthritis (M19.91)   Cardiovascular conditions   [x]Hypertension (I10)  []Hyperlipidemia (E78.5)  []Angina pectoris (I20)  []Atherosclerosis (I70)  []CVA Musculoskeletal conditions   []Disc pathology   []Congenital spine pathologies   []Prior surgical intervention  []Osteoporosis (M81.8)  []Osteopenia (M85.8)   Endocrine conditions   []Hypothyroid (E03.9)  []Hyperthyroid Gastrointestinal conditions   []Constipation (R96.37)   Metabolic conditions   []Morbid obesity (E66.01)  [x]Diabetes type 1(E10.65) or 2 (E11.65)   []Neuropathy (G60.9)     Pulmonary conditions   []Asthma (J45)  []Coughing   []COPD (J44.9)   Psychological Disorders  [x]Anxiety (F41.9)  []Depression (F32.9)   []Other:   []Other:          Barriers to/and or personal factors that will affect rehab potential:              []Age  []Sex    []Smoker              []Motivation/Lack of Motivation                        []Co-Morbidities              []Cognitive Function, education/learning barriers              []Environmental, home barriers              []profession/work barriers  []past PT/medical experience  []other:  Justification:     Falls Risk Assessment (30 days):   [x] Falls Risk assessed and no intervention required. [] Falls Risk assessed and Patient requires intervention due to being higher risk   TUG score (>12s at risk):     [] Falls education provided, including         ASSESSMENT: Patient is a 76 yo male who presents to therapy with acute on chronic flare up of L knee pain. Upon assessment, patient with decreased L hip ROM, decreased proximal hip and knee strength, as well as increased soft tissue restriction along L adductors, medial joint line and medial hamstring. Patient will benefit from further skilled PT services to address noted deficits.      Functional Impairments:     [x]Noted lumbar/proximal hip/LE hypomobility   [x]Decreased LE functional ROM   [x]Decreased core/proximal hip strength and neuromuscular control   [x]Decreased LE functional strength   []Reduced balance/proprioceptive control   []other:      Functional Activity Limitations (from functional questionnaire and intake)   [x]Reduced ability to tolerate prolonged functional positions   [x]Reduced ability or difficulty with changes of positions or transfers between positions   []Reduced ability to maintain good posture and demonstrate good body mechanics with sitting, bending, and lifting   []Reduced ability to sleep   [x] Reduced ability or tolerance with driving and/or computer work   []Reduced ability to perform lifting, carrying tasks   []Reduced ability to squat   []Reduced ability to forward bend   []Reduced ability to ambulate prolonged functional periods/distances/surfaces   []Reduced ability to ascend/descend stairs   []Reduced ability to run, hop or jump   []other:     Participation Restrictions   []Reduced participation in self care activities   []Reduced participation in home management activities   [x]Reduced participation in work activities   [x]Reduced participation in social activities. []Reduced participation in sport activities. Classification :    []Signs/symptoms consistent with post-surgical status including decreased ROM, strength and function.    []Signs/symptoms consistent with joint sprain/strain   []Signs/symptoms consistent with patella-femoral syndrome   []Signs/symptoms consistent with knee OA/hip OA   [x]Signs/symptoms consistent with internal derangement of knee/Hip   [x]Signs/symptoms consistent with functional hip weakness/NMR control      []Signs/symptoms consistent with tendinitis/tendinosis    []signs/symptoms consistent with pathology which may benefit from Dry needling      []other:      Prognosis/Rehab Potential:      []Excellent   [x]Good    []Fair   []Poor    Tolerance of evaluation/treatment: []Excellent   [x]Good    []Fair   []Poor    Physical Therapy Evaluation Complexity Justification  [x] A history of present problem with:  [] no personal factors and/or comorbidities that impact the plan of care;  [x]1-2 personal factors and/or comorbidities that impact the plan of care  []3 personal factors and/or comorbidities that impact the plan of care  [x] An examination of body systems using standardized tests and measures addressing any of the following: body structures and functions (impairments), activity limitations, and/or participation restrictions;:  [x] a total of 1-2 or more elements   [] a total of 3 or more elements   [] a total of 4 or more elements   [x] A clinical presentation with:  [x] stable and/or uncomplicated characteristics   [] evolving clinical presentation with changing characteristics  [] unstable and unpredictable characteristics;   [x] Clinical decision making of [x] low, [] moderate, [] high complexity using standardized patient assessment instrument and/or measurable assessment of functional outcome. [x] EVAL (LOW) 10650 (typically 20 minutes face-to-face)  [] EVAL (MOD) 96373 (typically 30 minutes face-to-face)  [] EVAL (HIGH) 24114 (typically 45 minutes face-to-face)  [] RE-EVAL     PLAN:  Frequency/Duration:  1-2 days per week for 8 Weeks:  Interventions:  [x]  Therapeutic exercise including: strength training, ROM, for Lower extremity and core   [x]  NMR activation and proprioception for LE, Glutes and Core   [x]  Manual therapy as indicated for LE, Hip and spine to include: Dry Needling/IASTM, STM, PROM, Gr I-IV mobilizations, manipulation. [x] Modalities as needed that may include: thermal agents, E-stim, Biofeedback, US, iontophoresis as indicated  [x] Patient education on joint protection, postural re-education, activity modification, progression of HEP.     HEP instruction: (see scanned forms)    GOALS:  Patient stated goal: painfree during sitting and driving  []

## 2022-11-02 ENCOUNTER — HOSPITAL ENCOUNTER (OUTPATIENT)
Dept: PHYSICAL THERAPY | Age: 67
Setting detail: THERAPIES SERIES
Discharge: HOME | End: 2022-11-02
Payer: MEDICARE

## 2022-11-02 PROCEDURE — 97110 THERAPEUTIC EXERCISES: CPT

## 2022-11-02 PROCEDURE — 97140 MANUAL THERAPY 1/> REGIONS: CPT

## 2022-11-02 NOTE — FLOWSHEET NOTE
[address] [address] [place]  Phone: [phone] Fax: [phone]    Physical Therapy Treatment Note/ Progress Report:       Date:  2022    Patient Name:  Mary Ann Palacio    :  1955  MRN: [id number]  Restrictions/Precautions:    Medical/Treatment Diagnosis Information:  Diagnosis: L knee pain, L knee MMT  Treatment Diagnosis: L knee MMT S83.242A, L knee pain T78.668  Insurance/Certification information:  PT Insurance Information: Medicare/AARP  Physician Information:  Kiley Mcgraw, [address] [facility] signed (Y/N):     Date of Patient follow up with Physician:      Progress Report: [x]  Yes  []  No     Date Range for reporting period:  Beginning: 10/27/2022  Ending:     Progress report due (10 Rx/or 30 days whichever is less):      Recertification due (POC duration/ or 90 days whichever is less): 2022     Visit # Insurance Allowable Auth Needed   2 Medicare/AARP []Yes    []No     Latex Allergy:  [x]NO      []YES  Preferred Language for Healthcare:   [x]English       []other:  Functional Scale: Foto 56 , LEFS 43.9 Date assessed:10/27/2022    Pain level:  3-6/10     SUBJECTIVE:  Patient reports that he was really sore after last session which lasted for about 4-5 days, was especially sore along adductors. Notes that his knee pain was much less on his drive in to PT today.      OBJECTIVE: See eval  Observation:   Test measurements:      RESTRICTIONS/PRECAUTIONS: none    Exercises/Interventions:     Therapeutic Ex (56375)   Min: 25 Sets/sec Reps Notes/CUES   Retro Stepper/BIKE      Alter G      BFR      Sportcord March      3 way SLR      SAQ      Clam ABD      Hip Ext /table      BOSU fwd/side lunge 10 10 L   SC march 4 15 SBA   Wall sit- cues heel 10sec 6 80-90 degrees   Leg Press Iso/Con/Ecc 0- 5sec 10 Bilat, 2 to 1 eccentrics, 50 lb   Hip Abd with eccentric control      Hip Add with eccentric control Glute side walks 2 2    Slide Lunge 1 10                                              Manual Intervention (14712)  Min: 20      Knee mobs/PROM      Tib/Fem Mobs      Patella Mobs      Ankle mobs      STM adductors, medial joint line, medial hamstring  15    L hip belt mobs  5    NMR re-education (00446)  Min:   CUES NEEDED   Faroese/Biofeedback 10/10      BFR      G. Med activation      Hip Ext full ROM/ G. Activation      Bosu Bal and Prop- G Med      Single leg stance/Balance/Prop      Bosu Retro G. Med act      Prone Hip froggers- sliders/elevated            Therapeutic Activity (71240)  Min:      Ladders      Plyos      Dynamic Balance                            Therapeutic Exercise and NMR EXR  [x] (92875) Provided verbal/tactile cueing for activities related to strengthening, flexibility, endurance, ROM for improvements in LE, proximal hip, and core control with self care, mobility, lifting, ambulation. [x] (25963) Provided verbal/tactile cueing for activities related to improving balance, coordination, kinesthetic sense, posture, motor skill, proprioception  to assist with LE, proximal hip, and core control in self care, mobility, lifting, ambulation and eccentric single leg control.      NMR and Therapeutic Activities:    [x] (17471 or 08131) Provided verbal/tactile cueing for activities related to improving balance, coordination, kinesthetic sense, posture, motor skill, proprioception and motor activation to allow for proper function of core, proximal hip and LE with self care and ADLs and functional mobility.   [] (51214) Gait Re-education- Provided training and instruction to the patient for proper LE, core and proximal hip recruitment and positioning and eccentric body weight control with ambulation re-education including up and down stairs     Home Exercise Program:    [x] (91628) Reviewed/Progressed HEP activities related to strengthening, flexibility, endurance, ROM of core, proximal hip and LE for functional self-care, mobility, lifting and ambulation/stair navigation   [] (11910)Reviewed/Progressed HEP activities related to improving balance, coordination, kinesthetic sense, posture, motor skill, proprioception of core, proximal hip and LE for self care, mobility, lifting, and ambulation/stair navigation      Manual Treatments:  PROM / STM / Oscillations-Mobs:  G-I, II, III, IV (PA's, Inf., Post.)  [x] (97131) Provided manual therapy to mobilize LE, proximal hip and/or LS spine soft tissue/joints for the purpose of modulating pain, promoting relaxation,  increasing ROM, reducing/eliminating soft tissue swelling/inflammation/restriction, improving soft tissue extensibility and allowing for proper ROM for normal function with self care, mobility, lifting and ambulation. Modalities:     [] GAME READY (VASO)- for significant edema, swelling, pain control. Charges:  Timed Code Treatment Minutes: 45   Total Treatment Minutes: 45      [] EVAL (LOW) 54075 (typically 20 minutes face-to-face)  [] EVAL (MOD) 64525 (typically 30 minutes face-to-face)  [] EVAL (HIGH) 99134 (typically 45 minutes face-to-face)  [] RE-EVAL     [x] BH(64673) x  2   [] DRY NEEDLE 1 OR 2 MUSCLES  [] NMR (65882) x     [] DRY NEEDLE 3+ MUSCLES  [x] Manual (02823) x   1    [] TA (12666) x     [] Mech Traction (79915)  [] ES(attended) (61903)     [] ES (un) (86185):   [] VASO (17940)  [] Other:    If [facility] Please Indicate Time In/Out  CPT Code Time in Time out                                   GOALS:  Patient stated goal: painfree during sitting and driving  [] Progressing: [] Met: [] Not Met: [] Adjusted    Therapist goals for Patient:   Short Term Goals: To be achieved in: 2 weeks  1. Independent in HEP and progression per patient tolerance, in order to prevent re-injury. [] Progressing: [] Met: [] Not Met: [] Adjusted  2.  Patient will have a decrease in pain to facilitate improvement in movement, function, and ADLs as indicated by Functional Deficits. [] Progressing: [] Met: [] Not Met: [] Adjusted    Long Term Goals: To be achieved in: 8 weeks  1. Disability index score of 68 or better for Foto and 50/80 or better for the LEFS to assist with reaching prior level of function. [] Progressing: [] Met: [] Not Met: [] Adjusted  2. Patient will demonstrate increased AROM to L hip IR to 20 degrees to allow for proper joint functioning as indicated by patients Functional Deficits. [] Progressing: [] Met: [] Not Met: [] Adjusted  3. Patient will demonstrate an increase in Strength to good proximal hip strength and control, within 5lb HHD in LE to allow for proper functional mobility as indicated by patients Functional Deficits. [] Progressing: [] Met: [] Not Met: [] Adjusted  4. Patient will return to sitting work and driving activities without increased symptoms or restriction. [] Progressing: [] Met: [] Not Met: [] Adjusted  5. Patient will report decreased pain with standing and walking dogs. (patient specific functional goal)    [] Progressing: [] Met: [] Not Met: [] Adjusted     ASSESSMENT:  Less overall restriction in adductors and medial knee joint today. Good tolerance to all exercises, just noted fatigue and no pain. Return to Play: (if applicable)   []  Stage 1: Intro to Strength   []  Stage 2: Return to Run and Strength   []  Stage 3: Return to Jump and Strength   []  Stage 4: Dynamic Strength and Agility   []  Stage 5: Sport Specific Training     []  Ready to Return to Play, Meets All Above Stages   []  Not Ready for Return to Sports   Comments:            Treatment/Activity Tolerance:  [x] Patient tolerated treatment well [] Patient limited by fatique  [] Patient limited by pain  [] Patient limited by other medical complications  [] Other:     Overall Progression Towards Functional goals/ Treatment Progress Update:  [] Patient is progressing as expected towards functional goals listed.     [] Progression is slowed due to complexities/Impairments listed. [] Progression has been slowed due to co-morbidities. [x] Plan just implemented, too soon to assess goals progression <30days   [] Goals require adjustment due to lack of progress  [] Patient is not progressing as expected and requires additional follow up with physician  [] Other    Prognosis for POC: [x] Good [] Fair  [] Poor    Patient requires continued skilled intervention: [x] Yes  [] No        PLAN: See eval  [] Continue per plan of care [] Alter current plan (see comments)  [x] Plan of care initiated [] Hold pending MD visit [] Discharge    Electronically signed by: Lana Gleason PT    Note: If patient does not return for scheduled/recommended follow up visits, this note will serve as a discharge from care along with the most recent update on progress.

## 2022-11-09 ENCOUNTER — HOSPITAL ENCOUNTER (OUTPATIENT)
Dept: PHYSICAL THERAPY | Age: 67
Setting detail: THERAPIES SERIES
Discharge: HOME | End: 2022-11-09
Payer: MEDICARE

## 2022-11-09 PROCEDURE — 97140 MANUAL THERAPY 1/> REGIONS: CPT

## 2022-11-09 PROCEDURE — 97110 THERAPEUTIC EXERCISES: CPT

## 2022-11-09 NOTE — FLOWSHEET NOTE
[facility] [address] [address]  Phone: [phone] Fax: [phone]    Physical Therapy Treatment Note/ Progress Report:       Date:  2022    Patient Name:  Rafi Oneill    :  1955  MRN: [id number]  Restrictions/Precautions:    Medical/Treatment Diagnosis Information:  Diagnosis: L knee pain, L knee MMT  Treatment Diagnosis: L knee MMT S83.242A, L knee pain J39.893  Insurance/Certification information:  PT Insurance Information: Medicare/AARP  Physician Information:  Hortensia Pimentelamisha, [address] [facility] signed (Y/N):     Date of Patient follow up with Physician:      Progress Report: [x]  Yes  []  No     Date Range for reporting period:  Beginning: 10/27/2022  Ending:     Progress report due (10 Rx/or 30 days whichever is less):      Recertification due (POC duration/ or 90 days whichever is less): 2022     Visit # Insurance Allowable Auth Needed   3 Medicare/AARP []Yes    []No     Latex Allergy:  [x]NO      []YES  Preferred Language for Healthcare:   [x]English       []other:  Functional Scale: Foto 56 , LEFS 43.9 Date assessed:10/27/2022    Pain level:  3-6/10     SUBJECTIVE:  Patient reports that he wasn't as sore after last session. States that he still feels knee pain when driving in car if he doesn't stop for frequent breaks. Has been making sure to stand and walk frequently at work.        OBJECTIVE: See eval  Observation:   Test measurements:      RESTRICTIONS/PRECAUTIONS: none    Exercises/Interventions:     Therapeutic Ex (75842)   Min: 20 Sets/sec Reps Notes/CUES   Retro Stepper/BIKE      Alter G      BFR      Sportcord March      3 way SLR      SAQ      Clam ABD      Hip Ext /table      BOSU fwd/side lunge 10 10 L   SC march 4 15 SBA   Wall sit- cues heel 10sec 6 80-90 degrees   Leg Press Iso/Con/Ecc 0- 5sec 10 Bilat, 2 to 1 eccentrics, 50 lb   Hip Abd with eccentric control      Hip Add with eccentric control      Glute side walks 2 2    Slide Lunge 1 10                                              Manual Intervention (76266)  Min: 25      Knee mobs/PROM      Tib/Fem Mobs      Patella Mobs      Manual stretching of hip adductors, piriformis and hamstring  5 L   STM adductors, medial joint line, medial hamstring  15    L hip belt mobs  5    NMR re-education (82152)  Min:   CUES NEEDED   Cambodian/Biofeedback 10/10      BFR      G. Med activation      Hip Ext full ROM/ G. Activation      Bosu Bal and Prop- G Med      Single leg stance/Balance/Prop      Bosu Retro G. Med act      Prone Hip froggers- sliders/elevated            Therapeutic Activity (73597)  Min:      Ladders      Plyos      Dynamic Balance                            Therapeutic Exercise and NMR EXR  [x] (04669) Provided verbal/tactile cueing for activities related to strengthening, flexibility, endurance, ROM for improvements in LE, proximal hip, and core control with self care, mobility, lifting, ambulation. [x] (25637) Provided verbal/tactile cueing for activities related to improving balance, coordination, kinesthetic sense, posture, motor skill, proprioception  to assist with LE, proximal hip, and core control in self care, mobility, lifting, ambulation and eccentric single leg control.      NMR and Therapeutic Activities:    [x] (31259 or 74058) Provided verbal/tactile cueing for activities related to improving balance, coordination, kinesthetic sense, posture, motor skill, proprioception and motor activation to allow for proper function of core, proximal hip and LE with self care and ADLs and functional mobility.   [] (64217) Gait Re-education- Provided training and instruction to the patient for proper LE, core and proximal hip recruitment and positioning and eccentric body weight control with ambulation re-education including up and down stairs     Home Exercise Program:    [x] (94238) Reviewed/Progressed HEP activities related to strengthening, flexibility, endurance, ROM of core, proximal hip and LE for functional self-care, mobility, lifting and ambulation/stair navigation   [] (10221)Reviewed/Progressed HEP activities related to improving balance, coordination, kinesthetic sense, posture, motor skill, proprioception of core, proximal hip and LE for self care, mobility, lifting, and ambulation/stair navigation      Manual Treatments:  PROM / STM / Oscillations-Mobs:  G-I, II, III, IV (PA's, Inf., Post.)  [x] (36489) Provided manual therapy to mobilize LE, proximal hip and/or LS spine soft tissue/joints for the purpose of modulating pain, promoting relaxation,  increasing ROM, reducing/eliminating soft tissue swelling/inflammation/restriction, improving soft tissue extensibility and allowing for proper ROM for normal function with self care, mobility, lifting and ambulation. Modalities:     [] GAME READY (VASO)- for significant edema, swelling, pain control. Charges:  Timed Code Treatment Minutes: 45   Total Treatment Minutes: 45      [] EVAL (LOW) 94909 (typically 20 minutes face-to-face)  [] EVAL (MOD) 37683 (typically 30 minutes face-to-face)  [] EVAL (HIGH) 86086 (typically 45 minutes face-to-face)  [] RE-EVAL     [x] RP(89221) x  1   [] DRY NEEDLE 1 OR 2 MUSCLES  [] NMR (47184) x     [] DRY NEEDLE 3+ MUSCLES  [x] Manual (04473) x   2    [] TA (41030) x     [] Mech Traction (42392)  [] ES(attended) (16924)     [] ES (un) (98495):   [] VASO (26988)  [] Other:    If [facility] Please Indicate Time In/Out  CPT Code Time in Time out                                   GOALS:  Patient stated goal: painfree during sitting and driving  [] Progressing: [] Met: [] Not Met: [] Adjusted    Therapist goals for Patient:   Short Term Goals: To be achieved in: 2 weeks  1. Independent in HEP and progression per patient tolerance, in order to prevent re-injury. [] Progressing: [] Met: [] Not Met: [] Adjusted  2.  Patient will have a decrease in pain to facilitate improvement in movement, function, and ADLs as indicated by Functional Deficits. [] Progressing: [] Met: [] Not Met: [] Adjusted    Long Term Goals: To be achieved in: 8 weeks  1. Disability index score of 68 or better for Foto and 50/80 or better for the LEFS to assist with reaching prior level of function. [] Progressing: [] Met: [] Not Met: [] Adjusted  2. Patient will demonstrate increased AROM to L hip IR to 20 degrees to allow for proper joint functioning as indicated by patients Functional Deficits. [] Progressing: [] Met: [] Not Met: [] Adjusted  3. Patient will demonstrate an increase in Strength to good proximal hip strength and control, within 5lb HHD in LE to allow for proper functional mobility as indicated by patients Functional Deficits. [] Progressing: [] Met: [] Not Met: [] Adjusted  4. Patient will return to sitting work and driving activities without increased symptoms or restriction. [] Progressing: [] Met: [] Not Met: [] Adjusted  5. Patient will report decreased pain with standing and walking dogs. (patient specific functional goal)    [] Progressing: [] Met: [] Not Met: [] Adjusted     ASSESSMENT:  Increased restriction in adductors and medial knee joint today compared to last session. Good improvement in overall hip mobility and soft tissue extensibility with stretching. Good tolerance to all exercises, just noted fatigue and no pain.      Return to Play: (if applicable)   []  Stage 1: Intro to Strength   []  Stage 2: Return to Run and Strength   []  Stage 3: Return to Jump and Strength   []  Stage 4: Dynamic Strength and Agility   []  Stage 5: Sport Specific Training     []  Ready to Return to Play, Meets All Above Stages   []  Not Ready for Return to Sports   Comments:            Treatment/Activity Tolerance:  [x] Patient tolerated treatment well [] Patient limited by fatique  [] Patient limited by pain  [] Patient limited by other medical complications  [] Other: Overall Progression Towards Functional goals/ Treatment Progress Update:  [] Patient is progressing as expected towards functional goals listed. [] Progression is slowed due to complexities/Impairments listed. [] Progression has been slowed due to co-morbidities. [x] Plan just implemented, too soon to assess goals progression <30days   [] Goals require adjustment due to lack of progress  [] Patient is not progressing as expected and requires additional follow up with physician  [] Other    Prognosis for POC: [x] Good [] Fair  [] Poor    Patient requires continued skilled intervention: [x] Yes  [] No        PLAN: See eval  [] Continue per plan of care [] Alter current plan (see comments)  [x] Plan of care initiated [] Hold pending MD visit [] Discharge    Electronically signed by: Joi Hollingsworth PT    Note: If patient does not return for scheduled/recommended follow up visits, this note will serve as a discharge from care along with the most recent update on progress.

## 2022-11-23 ENCOUNTER — HOSPITAL ENCOUNTER (OUTPATIENT)
Dept: PHYSICAL THERAPY | Age: 67
Setting detail: THERAPIES SERIES
Discharge: HOME | End: 2022-11-23
Payer: MEDICARE

## 2022-11-23 PROCEDURE — 97110 THERAPEUTIC EXERCISES: CPT

## 2022-11-23 PROCEDURE — 97140 MANUAL THERAPY 1/> REGIONS: CPT

## 2022-11-23 NOTE — FLOWSHEET NOTE
[place] [address]  Phone: [phone] Fax: [phone]    Physical Therapy Treatment Note/ Progress Report:       Date:  2022    Patient Name:  Debbie Bond    :  1955  MRN: [id number]  Restrictions/Precautions:    Medical/Treatment Diagnosis Information:  Diagnosis: L knee pain, L knee MMT  Treatment Diagnosis: L knee MMT S83.242A, L knee pain O46.647  Insurance/Certification information:  PT Insurance Information: Medicare/AARP  Physician Information:  Diana Avila, [address] [facility] signed (Y/N):     Date of Patient follow up with Physician:      Progress Report: [x]  Yes  []  No     Date Range for reporting period:  Beginning: 10/27/2022  Ending:     Progress report due (10 Rx/or 30 days whichever is less):      Recertification due (POC duration/ or 90 days whichever is less): 2022     Visit # Insurance Allowable Auth Needed   4 Medicare/AARP []Yes    []No     Latex Allergy:  [x]NO      []YES  Preferred Language for Healthcare:   [x]English       []other:  Functional Scale: Foto 56 , LEFS 43.9 Date assessed:10/27/2022    Pain level:  3-6/10     SUBJECTIVE:  Patient reports that knee is feeling is feeling about 50% improved since starting therapy. States that he had to do a long drive to [facility] yesterday and surprised that it isn't bothering him as much.       OBJECTIVE: See eval  Observation:   Test measurements:      RESTRICTIONS/PRECAUTIONS: none    Exercises/Interventions:     Therapeutic Ex (54781)   Min: 15 Sets/sec Reps Notes/CUES   Retro Stepper/BIKE      Alter G      BFR      Sportcord March      3 way SLR      SAQ      Clam ABD      Hip Ext /table      BOSU fwd/side lunge 10 10 L   SC march 4 15 SBA   Wall sit- cues heel 10sec 6 80-90 degrees   Leg Press Iso/Con/Ecc 0- 5sec 10 Bilat, 2 to 1 eccentrics, 50 lb   Hip Abd with eccentric control      Hip Add with eccentric control      Glute side walks 0     Slide Lunge 0                                               Manual Intervention (00635)  Min: 25      Knee mobs/PROM      Tib/Fem Mobs      Patella Mobs      Manual stretching of hip adductors, piriformis and hamstring  5 L   STM adductors, medial joint line, medial hamstring  15    L hip belt mobs  5    NMR re-education (07610)  Min:   CUES NEEDED   Prydeinig/Biofeedback 10/10      BFR      G. Med activation      Hip Ext full ROM/ G. Activation      Bosu Bal and Prop- G Med      Single leg stance/Balance/Prop      Bosu Retro G. Med act      Prone Hip froggers- sliders/elevated            Therapeutic Activity (33181)  Min:      Ladders      Plyos      Dynamic Balance                            Therapeutic Exercise and NMR EXR  [x] (69269) Provided verbal/tactile cueing for activities related to strengthening, flexibility, endurance, ROM for improvements in LE, proximal hip, and core control with self care, mobility, lifting, ambulation. [x] (85375) Provided verbal/tactile cueing for activities related to improving balance, coordination, kinesthetic sense, posture, motor skill, proprioception  to assist with LE, proximal hip, and core control in self care, mobility, lifting, ambulation and eccentric single leg control.      NMR and Therapeutic Activities:    [x] (16534 or 63730) Provided verbal/tactile cueing for activities related to improving balance, coordination, kinesthetic sense, posture, motor skill, proprioception and motor activation to allow for proper function of core, proximal hip and LE with self care and ADLs and functional mobility.   [] (40984) Gait Re-education- Provided training and instruction to the patient for proper LE, core and proximal hip recruitment and positioning and eccentric body weight control with ambulation re-education including up and down stairs     Home Exercise Program:    [x] (79052) Reviewed/Progressed HEP activities related to strengthening, flexibility, endurance, ROM of core, proximal hip and LE for functional self-care, mobility, lifting and ambulation/stair navigation   [] (06610)Reviewed/Progressed HEP activities related to improving balance, coordination, kinesthetic sense, posture, motor skill, proprioception of core, proximal hip and LE for self care, mobility, lifting, and ambulation/stair navigation      Manual Treatments:  PROM / STM / Oscillations-Mobs:  G-I, II, III, IV (PA's, Inf., Post.)  [x] (98339) Provided manual therapy to mobilize LE, proximal hip and/or LS spine soft tissue/joints for the purpose of modulating pain, promoting relaxation,  increasing ROM, reducing/eliminating soft tissue swelling/inflammation/restriction, improving soft tissue extensibility and allowing for proper ROM for normal function with self care, mobility, lifting and ambulation. Modalities:     [] GAME READY (VASO)- for significant edema, swelling, pain control. Charges:  Timed Code Treatment Minutes: 40   Total Treatment Minutes: 42      [] EVAL (LOW) 49557 (typically 20 minutes face-to-face)  [] EVAL (MOD) 41510 (typically 30 minutes face-to-face)  [] EVAL (HIGH) 08509 (typically 45 minutes face-to-face)  [] RE-EVAL     [x] AI(19100) x  1   [] DRY NEEDLE 1 OR 2 MUSCLES  [] NMR (53502) x     [] DRY NEEDLE 3+ MUSCLES  [x] Manual (77934) x   2    [] TA (93673) x     [] Mech Traction (79242)  [] ES(attended) (13982)     [] ES (un) (64894):   [] VASO (69795)  [] Other:    If [facility] Please Indicate Time In/Out  CPT Code Time in Time out                                   GOALS:  Patient stated goal: painfree during sitting and driving  [] Progressing: [] Met: [] Not Met: [] Adjusted    Therapist goals for Patient:   Short Term Goals: To be achieved in: 2 weeks  1. Independent in HEP and progression per patient tolerance, in order to prevent re-injury. [] Progressing: [] Met: [] Not Met: [] Adjusted  2.  Patient will have a decrease in pain to facilitate improvement in movement, function, and ADLs as indicated by Functional Deficits. [] Progressing: [] Met: [] Not Met: [] Adjusted    Long Term Goals: To be achieved in: 8 weeks  1. Disability index score of 68 or better for Foto and 50/80 or better for the LEFS to assist with reaching prior level of function. [] Progressing: [] Met: [] Not Met: [] Adjusted  2. Patient will demonstrate increased AROM to L hip IR to 20 degrees to allow for proper joint functioning as indicated by patients Functional Deficits. [] Progressing: [] Met: [] Not Met: [] Adjusted  3. Patient will demonstrate an increase in Strength to good proximal hip strength and control, within 5lb HHD in LE to allow for proper functional mobility as indicated by patients Functional Deficits. [] Progressing: [] Met: [] Not Met: [] Adjusted  4. Patient will return to sitting work and driving activities without increased symptoms or restriction. [] Progressing: [] Met: [] Not Met: [] Adjusted  5. Patient will report decreased pain with standing and walking dogs. (patient specific functional goal)    [] Progressing: [] Met: [] Not Met: [] Adjusted     ASSESSMENT:  Increased restriction in adductors and medial knee joint today compared to last session. Good improvement in overall hip mobility and soft tissue extensibility with stretching. Good tolerance to all exercises, just noted fatigue and no pain.      Return to Play: (if applicable)   []  Stage 1: Intro to Strength   []  Stage 2: Return to Run and Strength   []  Stage 3: Return to Jump and Strength   []  Stage 4: Dynamic Strength and Agility   []  Stage 5: Sport Specific Training     []  Ready to Return to Play, Meets All Above Stages   []  Not Ready for Return to Sports   Comments:            Treatment/Activity Tolerance:  [x] Patient tolerated treatment well [] Patient limited by fatique  [] Patient limited by pain  [] Patient limited by other medical complications  [] Other:     Overall Progression Towards Functional goals/ Treatment Progress Update:  [] Patient is progressing as expected towards functional goals listed. [] Progression is slowed due to complexities/Impairments listed. [] Progression has been slowed due to co-morbidities. [x] Plan just implemented, too soon to assess goals progression <30days   [] Goals require adjustment due to lack of progress  [] Patient is not progressing as expected and requires additional follow up with physician  [] Other    Prognosis for POC: [x] Good [] Fair  [] Poor    Patient requires continued skilled intervention: [x] Yes  [] No        PLAN: See eval  [] Continue per plan of care [] Alter current plan (see comments)  [x] Plan of care initiated [] Hold pending MD visit [] Discharge    Electronically signed by: Bevin Duane, PT    Note: If patient does not return for scheduled/recommended follow up visits, this note will serve as a discharge from care along with the most recent update on progress.

## 2022-11-30 ENCOUNTER — HOSPITAL ENCOUNTER (OUTPATIENT)
Dept: PHYSICAL THERAPY | Age: 67
Setting detail: THERAPIES SERIES
Discharge: HOME | End: 2022-11-30
Payer: MEDICARE

## 2022-11-30 PROCEDURE — 97110 THERAPEUTIC EXERCISES: CPT

## 2022-11-30 PROCEDURE — 97140 MANUAL THERAPY 1/> REGIONS: CPT

## 2022-11-30 NOTE — FLOWSHEET NOTE
[address] [address] [place]  Phone: [phone] Fax: [phone]    Physical Therapy Treatment Note/ Progress Report:       Date:  2022    Patient Name:  Andrew Marin    :  1955  MRN: [id number]  Restrictions/Precautions:    Medical/Treatment Diagnosis Information:  Diagnosis: L knee pain, L knee MMT  Treatment Diagnosis: L knee MMT S83.242A, L knee pain B22.361  Insurance/Certification information:  PT Insurance Information: Medicare/AARP  Physician Information:  Tom Craven, [address] [facility] signed (Y/N):     Date of Patient follow up with Physician:      Progress Report: [x]  Yes  []  No     Date Range for reporting period:  Beginning: 10/27/2022  Ending:     Progress report due (10 Rx/or 30 days whichever is less):      Recertification due (POC duration/ or 90 days whichever is less): 2022     Visit # Insurance Allowable Auth Needed   5 Medicare/AARP []Yes    []No     Latex Allergy:  [x]NO      []YES  Preferred Language for Healthcare:   [x]English       []other:  Functional Scale: Foto 56 , LEFS 43.9 Date assessed:10/27/2022    Pain level:  3-6/10     SUBJECTIVE:  Patient reports that his knee and hip have been sore. Feels that weather has been contributing to pain. Would like to discuss injections with doctor at next follow up.      OBJECTIVE: See eval  Observation:   Test measurements:      RESTRICTIONS/PRECAUTIONS: none    Exercises/Interventions:     Therapeutic Ex (10737)   Min: 15 Sets/sec Reps Notes/CUES   Retro Stepper/BIKE      Alter G      BFR      Sportcord March      3 way SLR      SAQ      Clam ABD      Hip Ext /table      BOSU fwd/side lunge 10 10 L   SC  15 SBA   Wall sit- cues heel 10sec 6 80-90 degrees   Leg Press Iso/Con/Ecc 0- 5sec 10 Bilat, 2 to 1 eccentrics, 50 lb   Hip Abd with eccentric control      Hip Add with eccentric control      Glute side walks 2 10 Slide Lunge 0                                               Manual Intervention (24469)  Min: 15      Knee mobs/PROM      Tib/Fem Mobs      Patella Mobs      Manual stretching of hip adductors, piriformis and hamstring  5 L   STM adductors, medial joint line, medial hamstring  5    L hip belt mobs  5    NMR re-education (59281)  Min:   CUES NEEDED   Cymro/Biofeedback 10/10      BFR      G. Med activation      Hip Ext full ROM/ G. Activation      Bosu Bal and Prop- G Med      Single leg stance/Balance/Prop      Bosu Retro G. Med act      Prone Hip froggers- sliders/elevated            Therapeutic Activity (49026)  Min:      Ladders      Plyos      Dynamic Balance                            Therapeutic Exercise and NMR EXR  [x] (71368) Provided verbal/tactile cueing for activities related to strengthening, flexibility, endurance, ROM for improvements in LE, proximal hip, and core control with self care, mobility, lifting, ambulation. [x] (50840) Provided verbal/tactile cueing for activities related to improving balance, coordination, kinesthetic sense, posture, motor skill, proprioception  to assist with LE, proximal hip, and core control in self care, mobility, lifting, ambulation and eccentric single leg control.      NMR and Therapeutic Activities:    [x] (38648 or 36390) Provided verbal/tactile cueing for activities related to improving balance, coordination, kinesthetic sense, posture, motor skill, proprioception and motor activation to allow for proper function of core, proximal hip and LE with self care and ADLs and functional mobility.   [] (10455) Gait Re-education- Provided training and instruction to the patient for proper LE, core and proximal hip recruitment and positioning and eccentric body weight control with ambulation re-education including up and down stairs     Home Exercise Program:    [x] (19079) Reviewed/Progressed HEP activities related to strengthening, flexibility, endurance, ROM of core, proximal hip and LE for functional self-care, mobility, lifting and ambulation/stair navigation   [] (67542)Reviewed/Progressed HEP activities related to improving balance, coordination, kinesthetic sense, posture, motor skill, proprioception of core, proximal hip and LE for self care, mobility, lifting, and ambulation/stair navigation      Manual Treatments:  PROM / STM / Oscillations-Mobs:  G-I, II, III, IV (PA's, Inf., Post.)  [x] (16378) Provided manual therapy to mobilize LE, proximal hip and/or LS spine soft tissue/joints for the purpose of modulating pain, promoting relaxation,  increasing ROM, reducing/eliminating soft tissue swelling/inflammation/restriction, improving soft tissue extensibility and allowing for proper ROM for normal function with self care, mobility, lifting and ambulation. Modalities:     [] GAME READY (VASO)- for significant edema, swelling, pain control. Charges:  Timed Code Treatment Minutes: 40   Total Treatment Minutes: 42      [] EVAL (LOW) 11387 (typically 20 minutes face-to-face)  [] EVAL (MOD) 34421 (typically 30 minutes face-to-face)  [] EVAL (HIGH) 37994 (typically 45 minutes face-to-face)  [] RE-EVAL     [x] BA(54607) x  1   [] DRY NEEDLE 1 OR 2 MUSCLES  [] NMR (97300) x     [] DRY NEEDLE 3+ MUSCLES  [x] Manual (55934) x   2    [] TA (88725) x     [] Mech Traction (83848)  [] ES(attended) (48120)     [] ES (un) (00427):   [] VASO (99893)  [] Other:    If [facility] Please Indicate Time In/Out  CPT Code Time in Time out                                   GOALS:  Patient stated goal: painfree during sitting and driving  [] Progressing: [] Met: [] Not Met: [] Adjusted    Therapist goals for Patient:   Short Term Goals: To be achieved in: 2 weeks  1. Independent in HEP and progression per patient tolerance, in order to prevent re-injury. [] Progressing: [] Met: [] Not Met: [] Adjusted  2.  Patient will have a decrease in pain to facilitate improvement in movement, function, and ADLs as indicated by Functional Deficits. [] Progressing: [] Met: [] Not Met: [] Adjusted    Long Term Goals: To be achieved in: 8 weeks  1. Disability index score of 68 or better for Foto and 50/80 or better for the LEFS to assist with reaching prior level of function. [] Progressing: [] Met: [] Not Met: [] Adjusted  2. Patient will demonstrate increased AROM to L hip IR to 20 degrees to allow for proper joint functioning as indicated by patients Functional Deficits. [] Progressing: [] Met: [] Not Met: [] Adjusted  3. Patient will demonstrate an increase in Strength to good proximal hip strength and control, within 5lb HHD in LE to allow for proper functional mobility as indicated by patients Functional Deficits. [] Progressing: [] Met: [] Not Met: [] Adjusted  4. Patient will return to sitting work and driving activities without increased symptoms or restriction. [] Progressing: [] Met: [] Not Met: [] Adjusted  5. Patient will report decreased pain with standing and walking dogs. (patient specific functional goal)    [] Progressing: [] Met: [] Not Met: [] Adjusted     ASSESSMENT:  Increased restriction in adductors, quad, and medial knee joint today compared to last session. Good improvement in overall hip mobility and soft tissue extensibility with stretching. Good tolerance to all exercises, just noted fatigue and no pain.      Return to Play: (if applicable)   []  Stage 1: Intro to Strength   []  Stage 2: Return to Run and Strength   []  Stage 3: Return to Jump and Strength   []  Stage 4: Dynamic Strength and Agility   []  Stage 5: Sport Specific Training     []  Ready to Return to Play, Meets All Above Stages   []  Not Ready for Return to Sports   Comments:            Treatment/Activity Tolerance:  [x] Patient tolerated treatment well [] Patient limited by fatique  [] Patient limited by pain  [] Patient limited by other medical complications  [] Other:     Overall Progression Towards Functional goals/ Treatment Progress Update:  [] Patient is progressing as expected towards functional goals listed. [] Progression is slowed due to complexities/Impairments listed. [] Progression has been slowed due to co-morbidities. [x] Plan just implemented, too soon to assess goals progression <30days   [] Goals require adjustment due to lack of progress  [] Patient is not progressing as expected and requires additional follow up with physician  [] Other    Prognosis for POC: [x] Good [] Fair  [] Poor    Patient requires continued skilled intervention: [x] Yes  [] No        PLAN: See eval  [] Continue per plan of care [] Alter current plan (see comments)  [x] Plan of care initiated [] Hold pending MD visit [] Discharge    Electronically signed by: Yecenia Maravilla PTA    Note: If patient does not return for scheduled/recommended follow up visits, this note will serve as a discharge from care along with the most recent update on progress.

## 2022-12-08 ENCOUNTER — HOSPITAL ENCOUNTER (OUTPATIENT)
Dept: PHYSICAL THERAPY | Age: 67
Setting detail: THERAPIES SERIES
Discharge: HOME | End: 2022-12-08
Payer: MEDICARE

## 2022-12-08 PROCEDURE — 97140 MANUAL THERAPY 1/> REGIONS: CPT

## 2022-12-08 PROCEDURE — 97110 THERAPEUTIC EXERCISES: CPT

## 2022-12-08 NOTE — FLOWSHEET NOTE
[address] [address] [place] [address] [address]  Phone: [phone] Fax: [phone]    Physical Therapy Treatment Note/ Progress Report:       Date:  2022    Patient Name:  Oliver Hightower    :  1955  MRN: [id number]  Restrictions/Precautions:    Medical/Treatment Diagnosis Information:  Diagnosis: L knee pain, L knee MMT  Treatment Diagnosis: L knee MMT S83.242A, L knee pain P15.633  Insurance/Certification information:  PT Insurance Information: Medicare/AARP  Physician Information:  Megha Diallo, [address] of [facility] signed (Y/N):     Date of Patient follow up with Physician:      Progress Report: []  Yes  [x]  No     Date Range for reporting period:  Beginning: 10/27/2022  Ending:     Progress report due (10 Rx/or 30 days whichever is less):      Recertification due (POC duration/ or 90 days whichever is less): 2022     Visit # Insurance Allowable Auth Needed   6 Medicare/AARP []Yes    []No     Latex Allergy:  [x]NO      []YES  Preferred Language for Healthcare:   [x]English       []other:  Functional Scale: Foto 56 , LEFS 43.9 Date assessed:10/27/2022    Pain level:  3-6/10     SUBJECTIVE:  Patient reports that his knee felt sore after walking up hill near [facility].      OBJECTIVE: See eval  Observation:   Test measurements:      RESTRICTIONS/PRECAUTIONS: none    Exercises/Interventions:     Therapeutic Ex (95545)   Min: 16 Sets/sec Reps Notes/CUES   Retro Stepper/BIKE      Alter G      BFR      Sportcord March      3 way SLR      SAQ 5sec 15 Full roll, 3lb   Clam ABD      Hip Ext /table      BOSU fwdlunge 10 10 L   SC march 4 15 Yellow SC, SBA    Wall sit- cues heel 80-90 degrees   Leg Press Iso/Con/Ecc 0-60 5sec 10 Bilat, 2 to 1 eccentrics, 50 lb   Hip Abd with eccentric control      Hip Add with eccentric control      Glute side walks 2 15 Red band above knees   Slide Lunge 0 Manual Intervention (22457)  Min: 16      Knee mobs/PROM      Hip IR stretching and log roll for hip IR  4    Patella Mobs  3    Manual stretching of hip adductors, piriformis and hamstring   L   STM adductors, medial joint line, medial hamstring  5    L hip belt mobs  4    NMR re-education (00574)  Min:   CUES NEEDED   Palauan/Biofeedback 10/10      BFR      G. Med activation      Hip Ext full ROM/ G. Activation      Bosu Bal and Prop- G Med      Single leg stance/Balance/Prop      Bosu Retro G. Med act      Prone Hip froggers- sliders/elevated            Therapeutic Activity (19070)  Min:      Ladders      Plyos      Dynamic Balance                            Therapeutic Exercise and NMR EXR  [x] (53973) Provided verbal/tactile cueing for activities related to strengthening, flexibility, endurance, ROM for improvements in LE, proximal hip, and core control with self care, mobility, lifting, ambulation. [x] (54923) Provided verbal/tactile cueing for activities related to improving balance, coordination, kinesthetic sense, posture, motor skill, proprioception  to assist with LE, proximal hip, and core control in self care, mobility, lifting, ambulation and eccentric single leg control.      NMR and Therapeutic Activities:    [x] (18007 or 44378) Provided verbal/tactile cueing for activities related to improving balance, coordination, kinesthetic sense, posture, motor skill, proprioception and motor activation to allow for proper function of core, proximal hip and LE with self care and ADLs and functional mobility.   [] (81028) Gait Re-education- Provided training and instruction to the patient for proper LE, core and proximal hip recruitment and positioning and eccentric body weight control with ambulation re-education including up and down stairs     Home Exercise Program:    [x] (26061) Reviewed/Progressed HEP activities related to strengthening, flexibility, endurance, ROM of core, proximal hip and LE for functional self-care, mobility, lifting and ambulation/stair navigation   [] (95945)Reviewed/Progressed HEP activities related to improving balance, coordination, kinesthetic sense, posture, motor skill, proprioception of core, proximal hip and LE for self care, mobility, lifting, and ambulation/stair navigation      Manual Treatments:  PROM / STM / Oscillations-Mobs:  G-I, II, III, IV (PA's, Inf., Post.)  [x] (31359) Provided manual therapy to mobilize LE, proximal hip and/or LS spine soft tissue/joints for the purpose of modulating pain, promoting relaxation,  increasing ROM, reducing/eliminating soft tissue swelling/inflammation/restriction, improving soft tissue extensibility and allowing for proper ROM for normal function with self care, mobility, lifting and ambulation. Modalities:     [] GAME READY (VASO)- for significant edema, swelling, pain control. Charges:  Timed Code Treatment Minutes: 32   Total Treatment Minutes: 32      [] EVAL (LOW) 97892 (typically 20 minutes face-to-face)  [] EVAL (MOD) 69687 (typically 30 minutes face-to-face)  [] EVAL (HIGH) 63223 (typically 45 minutes face-to-face)  [] RE-EVAL     [x] RASHAAD(67032) x  1   [] DRY NEEDLE 1 OR 2 MUSCLES  [] NMR (97896) x     [] DRY NEEDLE 3+ MUSCLES  [x] Manual (20894) x   1    [] TA (47580) x     [] Mech Traction (72976)  [] ES(attended) (36863)     [] ES (un) (98149):   [] VASO (73714)  [] Other:    If [facility] Please Indicate Time In/Out  CPT Code Time in Time out                                   GOALS:  Patient stated goal: painfree during sitting and driving  [] Progressing: [] Met: [] Not Met: [] Adjusted    Therapist goals for Patient:   Short Term Goals: To be achieved in: 2 weeks  1. Independent in HEP and progression per patient tolerance, in order to prevent re-injury. [] Progressing: [] Met: [] Not Met: [] Adjusted  2.  Patient will have a decrease in pain to facilitate improvement in movement, function, and ADLs as indicated by Functional Deficits. [] Progressing: [] Met: [] Not Met: [] Adjusted    Long Term Goals: To be achieved in: 8 weeks  1. Disability index score of 68 or better for Foto and 50/80 or better for the LEFS to assist with reaching prior level of function. [] Progressing: [] Met: [] Not Met: [] Adjusted  2. Patient will demonstrate increased AROM to L hip IR to 20 degrees to allow for proper joint functioning as indicated by patients Functional Deficits. [] Progressing: [] Met: [] Not Met: [] Adjusted  3. Patient will demonstrate an increase in Strength to good proximal hip strength and control, within 5lb HHD in LE to allow for proper functional mobility as indicated by patients Functional Deficits. [] Progressing: [] Met: [] Not Met: [] Adjusted  4. Patient will return to sitting work and driving activities without increased symptoms or restriction. [] Progressing: [] Met: [] Not Met: [] Adjusted  5. Patient will report decreased pain with standing and walking dogs. (patient specific functional goal)    [] Progressing: [] Met: [] Not Met: [] Adjusted     ASSESSMENT:  Knee ROM and adductor flexibility doing well. Tightness with hip IR today; improved after belt mobs and stretching. Good improvement in overall hip mobility and soft tissue extensibility with stretching. Good tolerance to all exercises, but some \"burning\" in the knee reported at end of session.    Return to Play: (if applicable)   []  Stage 1: Intro to Strength   []  Stage 2: Return to Run and Strength   []  Stage 3: Return to Jump and Strength   []  Stage 4: Dynamic Strength and Agility   []  Stage 5: Sport Specific Training     []  Ready to Return to Play, Meets All Above Stages   []  Not Ready for Return to Sports   Comments:            Treatment/Activity Tolerance:  [x] Patient tolerated treatment well [] Patient limited by fatique  [] Patient limited by pain  [] Patient limited by other medical complications  [] Other:     Overall Progression Towards Functional goals/ Treatment Progress Update:  [] Patient is progressing as expected towards functional goals listed. [] Progression is slowed due to complexities/Impairments listed. [] Progression has been slowed due to co-morbidities. [x] Plan just implemented, too soon to assess goals progression <30days   [] Goals require adjustment due to lack of progress  [] Patient is not progressing as expected and requires additional follow up with physician  [] Other    Prognosis for POC: [x] Good [] Fair  [] Poor    Patient requires continued skilled intervention: [x] Yes  [] No        PLAN: See eval  [x] Continue per plan of care [] Alter current plan (see comments)   []Plan of care initiated [] Hold pending MD visit [] Discharge    Electronically signed by: Cristine Hook PTA    Note: If patient does not return for scheduled/recommended follow up visits, this note will serve as a discharge from care along with the most recent update on progress.

## 2022-12-13 ENCOUNTER — HOSPITAL ENCOUNTER (OUTPATIENT)
Dept: PHYSICAL THERAPY | Age: 67
Setting detail: THERAPIES SERIES
Discharge: HOME | End: 2022-12-13
Payer: MEDICARE

## 2022-12-13 PROCEDURE — 97110 THERAPEUTIC EXERCISES: CPT

## 2022-12-13 PROCEDURE — 97140 MANUAL THERAPY 1/> REGIONS: CPT

## 2022-12-13 NOTE — FLOWSHEET NOTE
[address] [address] [place] [address]  Phone: [phone] Fax: [phone]    Physical Therapy Treatment Note/ Progress Report:       Date:  2022    Patient Name:  Becka Awad    :  1955  MRN: [id number]  Restrictions/Precautions:    Medical/Treatment Diagnosis Information:  Diagnosis: L knee pain, L knee MMT  Treatment Diagnosis: L knee MMT S83.242A, L knee pain H24.404  Insurance/Certification information:  PT Insurance Information: Medicare/AARP  Physician Information:  Naima Pacheco, [address] [facility] signed (Y/N):     Date of Patient follow up with Physician:      Progress Report: []  Yes  [x]  No     Date Range for reporting period:  Beginning: 10/27/2022  Ending:     Progress report due (10 Rx/or 30 days whichever is less):      Recertification due (POC duration/ or 90 days whichever is less): 2022     Visit # Insurance Allowable Auth Needed   7 Medicare/AARP []Yes    []No     Latex Allergy:  [x]NO      []YES  Preferred Language for Healthcare:   [x]English       []other:  Functional Scale: Foto 56 , LEFS 43.9 Date assessed:10/27/2022    Pain level:  3-6/10     SUBJECTIVE:  Patient reports that his knee has been sore and painful since last session. States that he has been feeling more just below patella.     OBJECTIVE: See eval  Observation:   Test measurements:      RESTRICTIONS/PRECAUTIONS: none    Exercises/Interventions:     Therapeutic Ex (23673)   Min: 25 Sets/sec Reps Notes/CUES   Retro Stepper/BIKE      Alter G      BFR      Sportcord March      SLR 5sec 8    SAQ 5sec 15 Full roll, 3lb   Clam ABD      Hip Ext /table      BOSU fwdlunge 10 10 L   SC  15 Yellow SC, SBA    Wall sit- cues heel 80-90 degrees   Leg Press Iso/Con/Ecc 0-60 5sec 15 Bilat, 2 to 1 eccentrics, 60 lb   Hip Abd with eccentric control      Hip Add with eccentric control      Glute side walks 2 15 Red band above knees Slide Lunge 0                                               Manual Intervention (01755)  Min: 20      Knee mobs/PROM      Hip IR stretching and log roll for hip IR  2    Patella Mobs  3    SL quad stretch and hip flexor stretch 30 4 L   STM adductors, medial joint line, medial hamstring, patellar tendon  7    L hip belt mobs  6    NMR re-education (09577)  Min:   CUES NEEDED   Togolese/Biofeedback 10/10      BFR      G. Med activation      Hip Ext full ROM/ G. Activation      Bosu Bal and Prop- G Med      Single leg stance/Balance/Prop      Bosu Retro G. Med act      Prone Hip froggers- sliders/elevated            Therapeutic Activity (45003)  Min:      Ladders      Plyos      Dynamic Balance                            Therapeutic Exercise and NMR EXR  [x] (20991) Provided verbal/tactile cueing for activities related to strengthening, flexibility, endurance, ROM for improvements in LE, proximal hip, and core control with self care, mobility, lifting, ambulation. [x] (33860) Provided verbal/tactile cueing for activities related to improving balance, coordination, kinesthetic sense, posture, motor skill, proprioception  to assist with LE, proximal hip, and core control in self care, mobility, lifting, ambulation and eccentric single leg control.      NMR and Therapeutic Activities:    [x] (27518 or 00622) Provided verbal/tactile cueing for activities related to improving balance, coordination, kinesthetic sense, posture, motor skill, proprioception and motor activation to allow for proper function of core, proximal hip and LE with self care and ADLs and functional mobility.   [] (59868) Gait Re-education- Provided training and instruction to the patient for proper LE, core and proximal hip recruitment and positioning and eccentric body weight control with ambulation re-education including up and down stairs     Home Exercise Program:    [x] (46900) Reviewed/Progressed HEP activities related to strengthening, flexibility, endurance, ROM of core, proximal hip and LE for functional self-care, mobility, lifting and ambulation/stair navigation   [] (78644)Reviewed/Progressed HEP activities related to improving balance, coordination, kinesthetic sense, posture, motor skill, proprioception of core, proximal hip and LE for self care, mobility, lifting, and ambulation/stair navigation      Manual Treatments:  PROM / STM / Oscillations-Mobs:  G-I, II, III, IV (PA's, Inf., Post.)  [x] (90324) Provided manual therapy to mobilize LE, proximal hip and/or LS spine soft tissue/joints for the purpose of modulating pain, promoting relaxation,  increasing ROM, reducing/eliminating soft tissue swelling/inflammation/restriction, improving soft tissue extensibility and allowing for proper ROM for normal function with self care, mobility, lifting and ambulation. Modalities:     [] GAME READY (VASO)- for significant edema, swelling, pain control. Charges:  Timed Code Treatment Minutes: 45   Total Treatment Minutes: 45      [] EVAL (LOW) 41492 (typically 20 minutes face-to-face)  [] EVAL (MOD) 99840 (typically 30 minutes face-to-face)  [] EVAL (HIGH) 44917 (typically 45 minutes face-to-face)  [] RE-EVAL     [x] NF(90488) x  2   [] DRY NEEDLE 1 OR 2 MUSCLES  [] NMR (91439) x     [] DRY NEEDLE 3+ MUSCLES  [x] Manual (01847) x   1    [] TA (70121) x     [] Mech Traction (56365)  [] ES(attended) (64666)     [] ES (un) (38205):   [] VASO (74041)  [] Other:    If [facility] Please Indicate Time In/Out  CPT Code Time in Time out                                   GOALS:  Patient stated goal: painfree during sitting and driving  [] Progressing: [] Met: [] Not Met: [] Adjusted    Therapist goals for Patient:   Short Term Goals: To be achieved in: 2 weeks  1. Independent in HEP and progression per patient tolerance, in order to prevent re-injury. [] Progressing: [] Met: [] Not Met: [] Adjusted  2.  Patient will have a decrease in pain to facilitate improvement in movement, function, and ADLs as indicated by Functional Deficits. [] Progressing: [] Met: [] Not Met: [] Adjusted    Long Term Goals: To be achieved in: 8 weeks  1. Disability index score of 68 or better for Foto and 50/80 or better for the LEFS to assist with reaching prior level of function. [] Progressing: [] Met: [] Not Met: [] Adjusted  2. Patient will demonstrate increased AROM to L hip IR to 20 degrees to allow for proper joint functioning as indicated by patients Functional Deficits. [] Progressing: [] Met: [] Not Met: [] Adjusted  3. Patient will demonstrate an increase in Strength to good proximal hip strength and control, within 5lb HHD in LE to allow for proper functional mobility as indicated by patients Functional Deficits. [] Progressing: [] Met: [] Not Met: [] Adjusted  4. Patient will return to sitting work and driving activities without increased symptoms or restriction. [] Progressing: [] Met: [] Not Met: [] Adjusted  5. Patient will report decreased pain with standing and walking dogs. (patient specific functional goal)    [] Progressing: [] Met: [] Not Met: [] Adjusted     ASSESSMENT:  Patient with tightness along adductor as well as with along patellar tendon. Good tolerance to STM to knee/adductors and with stretching of quad. Tight in L hip which improved with joint mobilization. Good tolerance to all exercises, but some \"burning\" in the knee reported at end of session with last exercise- all others were just fatiguing.      Return to Play: (if applicable)   []  Stage 1: Intro to Strength   []  Stage 2: Return to Run and Strength   []  Stage 3: Return to Jump and Strength   []  Stage 4: Dynamic Strength and Agility   []  Stage 5: Sport Specific Training     []  Ready to Return to Play, Meets All Above Stages   []  Not Ready for Return to Sports   Comments:            Treatment/Activity Tolerance:  [x] Patient tolerated treatment well [] Patient limited by marian  [] Patient limited by pain  [] Patient limited by other medical complications  [] Other:     Overall Progression Towards Functional goals/ Treatment Progress Update:  [x] Patient is progressing as expected towards functional goals listed. [] Progression is slowed due to complexities/Impairments listed. [] Progression has been slowed due to co-morbidities. [] Plan just implemented, too soon to assess goals progression <30days   [] Goals require adjustment due to lack of progress  [] Patient is not progressing as expected and requires additional follow up with physician  [] Other    Prognosis for POC: [x] Good [] Fair  [] Poor    Patient requires continued skilled intervention: [x] Yes  [] No        PLAN: See eval  [x] Continue per plan of care [] Alter current plan (see comments)   []Plan of care initiated [] Hold pending MD visit [] Discharge    Electronically signed by: Cayden Urrutia PT    Note: If patient does not return for scheduled/recommended follow up visits, this note will serve as a discharge from care along with the most recent update on progress.

## 2022-12-21 ENCOUNTER — HOSPITAL ENCOUNTER (OUTPATIENT)
Dept: PHYSICAL THERAPY | Age: 67
Setting detail: THERAPIES SERIES
Discharge: HOME | End: 2022-12-21
Payer: MEDICARE

## 2022-12-21 PROCEDURE — 97110 THERAPEUTIC EXERCISES: CPT

## 2022-12-21 PROCEDURE — 97140 MANUAL THERAPY 1/> REGIONS: CPT

## 2022-12-21 NOTE — FLOWSHEET NOTE
[place] [address] [address] [address]  Phone: [phone] Fax: [phone]    Physical Therapy Treatment Note/ Progress Report:       Date:  2022    Patient Name:  Vola Nageotte    :  1955  MRN: [id number]  Restrictions/Precautions:    Medical/Treatment Diagnosis Information:  Diagnosis: L knee pain, L knee MMT  Treatment Diagnosis: L knee MMT S83.242A, L knee pain X12.633  Insurance/Certification information:  PT Insurance Information: Medicare/AARP  Physician Information:  Mykel Flores, [address] [facility] signed (Y/N):     Date of Patient follow up with Physician:      Progress Report: []  Yes  [x]  No     Date Range for reporting period:  Beginning: 10/27/2022  Ending:     Progress report due (10 Rx/or 30 days whichever is less):      Recertification due (POC duration/ or 90 days whichever is less): 2022     Visit # Insurance Allowable Auth Needed   8 Medicare/AARP []Yes    []No     Latex Allergy:  [x]NO      []YES  Preferred Language for Healthcare:   [x]English       []other:  Functional Scale: Foto 56 , LEFS 43.9 Date assessed:10/27/2022    Pain level:  3/10     SUBJECTIVE:  Patient reports that his knee has been feeling much improved since last session. Notes that he changed out his shoes and since he hasn't worn those, it has been better. A little sore in adductors today.      OBJECTIVE: See eval  Observation:   Test measurements:      RESTRICTIONS/PRECAUTIONS: none    Exercises/Interventions:     Therapeutic Ex (02015)   Min: 25 Sets/sec Reps Notes/CUES   Retro Stepper/BIKE      Alter G      BFR      Sportcord March      SLR 5sec 15    SAQ 5sec 15 Full roll, 3lb   Clam ABD      Hip Ext /table      BOSU fwdlunge 10 10 L   SC  15 Yellow SC, SBA    Wall sit- cues heel 80-90 degrees   Leg Press Iso/Con/Ecc 0-60 5sec 15 Bilat, 2 to 1 eccentrics, 60 lb   Hip Abd with eccentric control      Hip Add with eccentric control      Glute side walks 2 15 Red band above knees   Slide Lunge 0                                               Manual Intervention (18499)  Min: 16      Knee mobs/PROM      Hip IR stretching and log roll for hip IR  0    Patella Mobs  3    SL quad stretch and hip flexor stretch 0  L   STM adductors, medial joint line, medial hamstring, patellar tendon  7    L hip belt mobs  6    NMR re-education (17967)  Min:   CUES NEEDED   Norwegian/Biofeedback 10/10      BFR      G. Med activation      Hip Ext full ROM/ G. Activation      Bosu Bal and Prop- G Med      Single leg stance/Balance/Prop      Bosu Retro G. Med act      Prone Hip froggers- sliders/elevated            Therapeutic Activity (93404)  Min:      Ladders      Plyos      Dynamic Balance                            Therapeutic Exercise and NMR EXR  [x] (67742) Provided verbal/tactile cueing for activities related to strengthening, flexibility, endurance, ROM for improvements in LE, proximal hip, and core control with self care, mobility, lifting, ambulation. [x] (78215) Provided verbal/tactile cueing for activities related to improving balance, coordination, kinesthetic sense, posture, motor skill, proprioception  to assist with LE, proximal hip, and core control in self care, mobility, lifting, ambulation and eccentric single leg control.      NMR and Therapeutic Activities:    [x] (59024 or 16204) Provided verbal/tactile cueing for activities related to improving balance, coordination, kinesthetic sense, posture, motor skill, proprioception and motor activation to allow for proper function of core, proximal hip and LE with self care and ADLs and functional mobility.   [] (85676) Gait Re-education- Provided training and instruction to the patient for proper LE, core and proximal hip recruitment and positioning and eccentric body weight control with ambulation re-education including up and down stairs     Home Exercise Program:    [x] (14491) Reviewed/Progressed HEP activities related to strengthening, flexibility, endurance, ROM of core, proximal hip and LE for functional self-care, mobility, lifting and ambulation/stair navigation   [] (90270)Reviewed/Progressed HEP activities related to improving balance, coordination, kinesthetic sense, posture, motor skill, proprioception of core, proximal hip and LE for self care, mobility, lifting, and ambulation/stair navigation      Manual Treatments:  PROM / STM / Oscillations-Mobs:  G-I, II, III, IV (PA's, Inf., Post.)  [x] (73994) Provided manual therapy to mobilize LE, proximal hip and/or LS spine soft tissue/joints for the purpose of modulating pain, promoting relaxation,  increasing ROM, reducing/eliminating soft tissue swelling/inflammation/restriction, improving soft tissue extensibility and allowing for proper ROM for normal function with self care, mobility, lifting and ambulation. Modalities:     [] GAME READY (VASO)- for significant edema, swelling, pain control. Charges:  Timed Code Treatment Minutes: 41   Total Treatment Minutes: 42      [] EVAL (LOW) 81735 (typically 20 minutes face-to-face)  [] EVAL (MOD) 26195 (typically 30 minutes face-to-face)  [] EVAL (HIGH) 06332 (typically 45 minutes face-to-face)  [] RE-EVAL     [x] AP(67986) x  2   [] DRY NEEDLE 1 OR 2 MUSCLES  [] NMR (33271) x     [] DRY NEEDLE 3+ MUSCLES  [x] Manual (18716) x   1    [] TA (05636) x     [] Mech Traction (25920)  [] ES(attended) (18995)     [] ES (un) (13354):   [] VASO (35605)  [] Other:    If [facility] Please Indicate Time In/Out  CPT Code Time in Time out                                   GOALS:  Patient stated goal: painfree during sitting and driving  [] Progressing: [] Met: [] Not Met: [] Adjusted    Therapist goals for Patient:   Short Term Goals: To be achieved in: 2 weeks  1. Independent in HEP and progression per patient tolerance, in order to prevent re-injury.    [] Progressing: [] Met: [] Not Met: [] Adjusted  2. Patient will have a decrease in pain to facilitate improvement in movement, function, and ADLs as indicated by Functional Deficits. [] Progressing: [] Met: [] Not Met: [] Adjusted    Long Term Goals: To be achieved in: 8 weeks  1. Disability index score of 68 or better for Foto and 50/80 or better for the LEFS to assist with reaching prior level of function. [] Progressing: [] Met: [] Not Met: [] Adjusted  2. Patient will demonstrate increased AROM to L hip IR to 20 degrees to allow for proper joint functioning as indicated by patients Functional Deficits. [] Progressing: [] Met: [] Not Met: [] Adjusted  3. Patient will demonstrate an increase in Strength to good proximal hip strength and control, within 5lb HHD in LE to allow for proper functional mobility as indicated by patients Functional Deficits. [] Progressing: [] Met: [] Not Met: [] Adjusted  4. Patient will return to sitting work and driving activities without increased symptoms or restriction. [] Progressing: [] Met: [] Not Met: [] Adjusted  5. Patient will report decreased pain with standing and walking dogs. (patient specific functional goal)    [] Progressing: [] Met: [] Not Met: [] Adjusted     ASSESSMENT:  Patient with less generalized tightness along adductor as well as with along patellar tendon. Good tolerance to STM to knee/adductors and with stretching of quad. L hip IR ROM restricted, with good improvement with joint mobilization. Good tolerance to all exercises- no noted issues throughout.       Return to Play: (if applicable)   []  Stage 1: Intro to Strength   []  Stage 2: Return to Run and Strength   []  Stage 3: Return to Jump and Strength   []  Stage 4: Dynamic Strength and Agility   []  Stage 5: Sport Specific Training     []  Ready to Return to Play, Meets All Above Stages   []  Not Ready for Return to Sports   Comments:            Treatment/Activity Tolerance:  [x] Patient tolerated treatment well [] Patient limited by marian  [] Patient limited by pain  [] Patient limited by other medical complications  [] Other:     Overall Progression Towards Functional goals/ Treatment Progress Update:  [x] Patient is progressing as expected towards functional goals listed. [] Progression is slowed due to complexities/Impairments listed. [] Progression has been slowed due to co-morbidities. [] Plan just implemented, too soon to assess goals progression <30days   [] Goals require adjustment due to lack of progress  [] Patient is not progressing as expected and requires additional follow up with physician  [] Other    Prognosis for POC: [x] Good [] Fair  [] Poor    Patient requires continued skilled intervention: [x] Yes  [] No        PLAN:   [x] Continue per plan of care [] Alter current plan (see comments)   []Plan of care initiated [] Hold pending MD visit [] Discharge    Electronically signed by: Alexander Luna PT    Note: If patient does not return for scheduled/recommended follow up visits, this note will serve as a discharge from care along with the most recent update on progress.

## 2022-12-29 ENCOUNTER — HOSPITAL ENCOUNTER (OUTPATIENT)
Dept: PHYSICAL THERAPY | Age: 67
Setting detail: THERAPIES SERIES
Discharge: HOME | End: 2022-12-29
Payer: MEDICARE

## 2022-12-29 PROCEDURE — 97140 MANUAL THERAPY 1/> REGIONS: CPT

## 2022-12-29 PROCEDURE — 97110 THERAPEUTIC EXERCISES: CPT

## 2022-12-29 NOTE — FLOWSHEET NOTE
[place] [address] [address]  Phone: [phone] Fax: [phone]    Physical Therapy Treatment Note/ Progress Report:       Date:  2022    Patient Name:  Chiki Montoya    :  1955  MRN: [id number]  Restrictions/Precautions:    Medical/Treatment Diagnosis Information:  Diagnosis: L knee pain, L knee MMT  Treatment Diagnosis: L knee MMT S83.242A, L knee pain I88.567  Insurance/Certification information:  PT Insurance Information: Medicare/AARP  Physician Information:  Barbara De La Vega [address] [facility] signed (Y/N):     Date of Patient follow up with Physician:      Progress Report: []  Yes  [x]  No     Date Range for reporting period:  Beginning: 10/27/2022  Ending:     Progress report due (10 Rx/or 30 days whichever is less):      Recertification due (POC duration/ or 90 days whichever is less): 2022     Visit # Insurance Allowable Auth Needed   9 Medicare/AARP []Yes    []No     Latex Allergy:  [x]NO      []YES  Preferred Language for Healthcare:   [x]English       []other:  Functional Scale: Foto 56 , LEFS 43.9 Date assessed:10/27/2022    Pain level:  0/10     SUBJECTIVE:  Patient reports that his knee continues to feel better since changing out his shoes. States that he is a little tight in knee. Still doesn't feel like he is back to his normal strength yet. Also, hasn't been able to return to walking dogs yet.       OBJECTIVE: See eval  Observation:   Test measurements:      RESTRICTIONS/PRECAUTIONS: none    Exercises/Interventions:     Therapeutic Ex (12286)   Min: 20 Sets/sec Reps Notes/CUES   Retro Stepper/BIKE      Alter G      BFR      Sportcord March      SLR 0     SAQ 0  Full roll, 3lb   Clam ABD      Hip Ext /table      BOSU fwdlunge 10 15 L   SC march 4 15 Yellow SC, SBA    Wall sit- cues heel 5sec 20 80-90 degrees   Leg Press Iso/Con/Ecc 0-60 5sec 15 Bilat, 2 to 1 eccentrics, 90 lb   Hip Abd with eccentric control      Hip Add with eccentric control      Glute side walks 2 15 Red band above knees   Slide Lunge 0                                               Manual Intervention (86724)  Min: 16      Knee mobs/PROM      Hip IR stretching and log roll for hip IR  0    Patella Mobs  3    SL quad stretch and hip flexor stretch 0  L   STM adductors, medial joint line, medial hamstring, patellar tendon  7    L hip belt mobs  6    NMR re-education (39237)  Min:   CUES NEEDED   Mosotho/Biofeedback 10/10      BFR      G. Med activation      Hip Ext full ROM/ G. Activation      Bosu Bal and Prop- G Med      Single leg stance/Balance/Prop      Bosu Retro G. Med act      Prone Hip froggers- sliders/elevated            Therapeutic Activity (82057)  Min:      Ladders      Plyos      Dynamic Balance                            Therapeutic Exercise and NMR EXR  [x] (30013) Provided verbal/tactile cueing for activities related to strengthening, flexibility, endurance, ROM for improvements in LE, proximal hip, and core control with self care, mobility, lifting, ambulation. [x] (87780) Provided verbal/tactile cueing for activities related to improving balance, coordination, kinesthetic sense, posture, motor skill, proprioception  to assist with LE, proximal hip, and core control in self care, mobility, lifting, ambulation and eccentric single leg control.      NMR and Therapeutic Activities:    [x] (26259 or 09245) Provided verbal/tactile cueing for activities related to improving balance, coordination, kinesthetic sense, posture, motor skill, proprioception and motor activation to allow for proper function of core, proximal hip and LE with self care and ADLs and functional mobility.   [] (54092) Gait Re-education- Provided training and instruction to the patient for proper LE, core and proximal hip recruitment and positioning and eccentric body weight control with ambulation re-education including up and down stairs Home Exercise Program:    [x] (21018) Reviewed/Progressed HEP activities related to strengthening, flexibility, endurance, ROM of core, proximal hip and LE for functional self-care, mobility, lifting and ambulation/stair navigation   [] (12141)Reviewed/Progressed HEP activities related to improving balance, coordination, kinesthetic sense, posture, motor skill, proprioception of core, proximal hip and LE for self care, mobility, lifting, and ambulation/stair navigation      Manual Treatments:  PROM / STM / Oscillations-Mobs:  G-I, II, III, IV (PA's, Inf., Post.)  [x] (05386) Provided manual therapy to mobilize LE, proximal hip and/or LS spine soft tissue/joints for the purpose of modulating pain, promoting relaxation,  increasing ROM, reducing/eliminating soft tissue swelling/inflammation/restriction, improving soft tissue extensibility and allowing for proper ROM for normal function with self care, mobility, lifting and ambulation. Modalities:     [] GAME READY (VASO)- for significant edema, swelling, pain control. Charges:  Timed Code Treatment Minutes: 36   Total Treatment Minutes: 36      [] EVAL (LOW) 46715 (typically 20 minutes face-to-face)  [] EVAL (MOD) 00247 (typically 30 minutes face-to-face)  [] EVAL (HIGH) 06918 (typically 45 minutes face-to-face)  [] RE-EVAL     [x] GL(53824) x  1   [] DRY NEEDLE 1 OR 2 MUSCLES  [] NMR (11778) x     [] DRY NEEDLE 3+ MUSCLES  [x] Manual (69053) x   1    [] TA (95077) x     [] Mech Traction (00382)  [] ES(attended) (26606)     [] ES (un) (47272):   [] VASO (59071)  [] Other:    If [facility] Please Indicate Time In/Out  CPT Code Time in Time out                                   GOALS:  Patient stated goal: painfree during sitting and driving  [] Progressing: [] Met: [] Not Met: [] Adjusted    Therapist goals for Patient:   Short Term Goals: To be achieved in: 2 weeks  1. Independent in HEP and progression per patient tolerance, in order to prevent re-injury.    [] Progressing: [] Met: [] Not Met: [] Adjusted  2. Patient will have a decrease in pain to facilitate improvement in movement, function, and ADLs as indicated by Functional Deficits. [] Progressing: [] Met: [] Not Met: [] Adjusted    Long Term Goals: To be achieved in: 8 weeks  1. Disability index score of 68 or better for Foto and 50/80 or better for the LEFS to assist with reaching prior level of function. [] Progressing: [] Met: [] Not Met: [] Adjusted  2. Patient will demonstrate increased AROM to L hip IR to 20 degrees to allow for proper joint functioning as indicated by patients Functional Deficits. [] Progressing: [] Met: [] Not Met: [] Adjusted  3. Patient will demonstrate an increase in Strength to good proximal hip strength and control, within 5lb HHD in LE to allow for proper functional mobility as indicated by patients Functional Deficits. [] Progressing: [] Met: [] Not Met: [] Adjusted  4. Patient will return to sitting work and driving activities without increased symptoms or restriction. [] Progressing: [] Met: [] Not Met: [] Adjusted  5. Patient will report decreased pain with standing and walking dogs. (patient specific functional goal)    [] Progressing: [] Met: [] Not Met: [] Adjusted     ASSESSMENT:  Patient with less overall generalized tightness along adductor as well as with along patellar tendon. Only restriction noted in proximal adductor. Tight in L hip IR ROM with good improvement with joint mobilization. Good tolerance to all exercises- no noted issues throughout other than fatigue.        Return to Play: (if applicable)   []  Stage 1: Intro to Strength   []  Stage 2: Return to Run and Strength   []  Stage 3: Return to Jump and Strength   []  Stage 4: Dynamic Strength and Agility   []  Stage 5: Sport Specific Training     []  Ready to Return to Play, Meets All Above Stages   []  Not Ready for Return to Sports   Comments:            Treatment/Activity Tolerance:  [x] Patient tolerated treatment well [] Patient limited by fatique  [] Patient limited by pain  [] Patient limited by other medical complications  [] Other:     Overall Progression Towards Functional goals/ Treatment Progress Update:  [x] Patient is progressing as expected towards functional goals listed. [] Progression is slowed due to complexities/Impairments listed. [] Progression has been slowed due to co-morbidities. [] Plan just implemented, too soon to assess goals progression <30days   [] Goals require adjustment due to lack of progress  [] Patient is not progressing as expected and requires additional follow up with physician  [] Other    Prognosis for POC: [x] Good [] Fair  [] Poor    Patient requires continued skilled intervention: [x] Yes  [] No        PLAN:   [x] Continue per plan of care [] Alter current plan (see comments)   []Plan of care initiated [] Hold pending MD visit [] Discharge    Electronically signed by: Juan Burt, PT    Note: If patient does not return for scheduled/recommended follow up visits, this note will serve as a discharge from care along with the most recent update on progress.

## 2023-01-03 ENCOUNTER — HOSPITAL ENCOUNTER (OUTPATIENT)
Dept: PHYSICAL THERAPY | Age: 68
Setting detail: THERAPIES SERIES
Discharge: HOME | End: 2023-01-03
Payer: MEDICARE

## 2023-01-03 PROCEDURE — 97140 MANUAL THERAPY 1/> REGIONS: CPT

## 2023-01-03 PROCEDURE — 97110 THERAPEUTIC EXERCISES: CPT

## 2023-01-03 NOTE — PLAN OF CARE
[address] [place] [place]  Phone: [phone] Fax: [phone]        Physical Therapy Re-Certification Plan of Care/MD UPDATE      Dear Dr Master Harrell ,    We had the pleasure of treating the following patient for physical therapy services at [address] [place] [address]. A summary of our findings can be found in the updated assessment below. This includes our plan of care. If you have any questions or concerns regarding these findings, please do not hesitate to contact me at the office phone number checked above.   Thank you for the referral.     Physician Signature:________________________________Date:__________________  By signing above (or electronic signature), therapists plan is approved by physician    Date Range Of Visits: 10/27/2022 thru 1/3/2023  Total Visits to Date: 10  Overall Response to Treatment:   [x]Patient is responding well to treatment and improvement is noted with regards  to goals   []Patient should continue to improve in reasonable time if they continue HEP   []Patient has plateaued and is no longer responding to skilled PT intervention    []Patient is getting worse and would benefit from return to referring MD   []Patient unable to adhere to initial POC   []Other:  Physical Therapy Treatment Note/ Progress Report:       Date:  2023    Patient Name:  Stevenson White    :  1955  MRN: [id number]  Restrictions/Precautions:    Medical/Treatment Diagnosis Information:  Diagnosis: L knee pain, L knee MMT  Treatment Diagnosis: L knee MMT S83.242A, L knee pain V13.525  Insurance/Certification information:  PT Insurance Information: Medicare/AARP  Physician Information:  Master Harrell, [address] [facility] signed (Y/N):     Date of Patient follow up with Physician:      Progress Report: [x]  Yes  []  No     Date Range for reporting period:  Beginning: 10/27/2022  Endin/3/2023    Progress report due (10 Rx/or 30 days whichever is less): 9/6/3165    Recertification due (POC duration/ or 90 days whichever is less): 2/3/2023    Visit # Insurance Allowable Auth Needed   10 Medicare/AARP []Yes    []No     Latex Allergy:  [x]NO      []YES  Preferred Language for Healthcare:   [x]English       []other:  Functional Scale: Foto 70 , LEFS 62 Date assessed:1/3/2023    Pain level:  0/10     SUBJECTIVE:  Patient reports that his knee continues to feel better since changing out his shoes. Notes his knee was a little sore after doing a little more weight on leg press last session. States that he had a little soreness with drive to PT today. Has been walking dogs about 2 miles daily without issue now.        OBJECTIVE:   Observation:   Test measurements:    ROM LEFT RIGHT   HIP Flex 125    HIP Abd      HIP Ext      HIP IR 20    HIP ER 55    Knee ext 0    Knee Flex 140    Strength  LEFT RIGHT   HIP Flexors 27.6 28.2   HIP Abductors 25.3 29.4   HIP Ext 28.4 28.5   Hip ER       Knee EXT (quad) 26.7 32.0   Knee Flex (HS) 27.9 28.9       RESTRICTIONS/PRECAUTIONS: none    Exercises/Interventions:     Therapeutic Ex (70834)   Min: 20 Sets/sec Reps Notes/CUES   Retro Stepper/BIKE      Alter G      BFR      Sportcord March      SLR 0     SAQ 0  Full roll, 3lb   Clam ABD      Hip Ext /table      BOSU fwdlunge 10 15 L   SC march 4 15 Yellow SC, SBA    Wall sit- cues heel 5sec 20 80-90 degrees   Leg Press Iso/Con/Ecc 0-60 5sec 15 Bilat, 2 to 1 eccentrics, 90 lb   Hip Abd with eccentric control      Hip Add with eccentric control      Glute side walks 2 15 Red band above knees   Slide Lunge 0                                               Manual Intervention (12673)  Min: 16      Knee mobs/PROM      Hip IR stretching and log roll for hip IR  0    Patella Mobs  3    SL quad stretch and hip flexor stretch 0  L   STM adductors, medial joint line, medial hamstring, patellar tendon  7    L hip belt mobs  6    NMR re-education (82471)  Min:   CUES NEEDED   Latvian/Biofeedback 10/10      BFR      G. Med activation      Hip Ext full ROM/ G. Activation      Bosu Bal and Prop- G Med      Single leg stance/Balance/Prop      Bosu Retro G. Med act      Prone Hip froggers- sliders/elevated            Therapeutic Activity (25386)  Min:      Ladders      Plyos      Dynamic Balance                            Therapeutic Exercise and NMR EXR  [x] (44661) Provided verbal/tactile cueing for activities related to strengthening, flexibility, endurance, ROM for improvements in LE, proximal hip, and core control with self care, mobility, lifting, ambulation. [x] (22831) Provided verbal/tactile cueing for activities related to improving balance, coordination, kinesthetic sense, posture, motor skill, proprioception  to assist with LE, proximal hip, and core control in self care, mobility, lifting, ambulation and eccentric single leg control.      NMR and Therapeutic Activities:    [x] (58465 or 25851) Provided verbal/tactile cueing for activities related to improving balance, coordination, kinesthetic sense, posture, motor skill, proprioception and motor activation to allow for proper function of core, proximal hip and LE with self care and ADLs and functional mobility.   [] (53748) Gait Re-education- Provided training and instruction to the patient for proper LE, core and proximal hip recruitment and positioning and eccentric body weight control with ambulation re-education including up and down stairs     Home Exercise Program:    [x] (11688) Reviewed/Progressed HEP activities related to strengthening, flexibility, endurance, ROM of core, proximal hip and LE for functional self-care, mobility, lifting and ambulation/stair navigation   [] (58839)Reviewed/Progressed HEP activities related to improving balance, coordination, kinesthetic sense, posture, motor skill, proprioception of core, proximal hip and LE for self care, mobility, lifting, and ambulation/stair navigation      Manual Treatments:  PROM / STM / Oscillations-Mobs:  G-I, II, III, IV (PA's, Inf., Post.)  [x] (40141) Provided manual therapy to mobilize LE, proximal hip and/or LS spine soft tissue/joints for the purpose of modulating pain, promoting relaxation,  increasing ROM, reducing/eliminating soft tissue swelling/inflammation/restriction, improving soft tissue extensibility and allowing for proper ROM for normal function with self care, mobility, lifting and ambulation. Modalities:     [] GAME READY (VASO)- for significant edema, swelling, pain control. Charges:  Timed Code Treatment Minutes: 36   Total Treatment Minutes: 36      [] EVAL (LOW) 23667 (typically 20 minutes face-to-face)  [] EVAL (MOD) 55335 (typically 30 minutes face-to-face)  [] EVAL (HIGH) 43093 (typically 45 minutes face-to-face)  [] RE-EVAL     [x] IW(40676) x  1   [] DRY NEEDLE 1 OR 2 MUSCLES  [] NMR (45464) x     [] DRY NEEDLE 3+ MUSCLES  [x] Manual (05988) x   1    [] TA (48447) x     [] Mech Traction (02814)  [] ES(attended) (87103)     [] ES (un) (95567):   [] VASO (61366)  [] Other:    If BW Please Indicate Time In/Out  CPT Code Time in Time out                                   GOALS:  Patient stated goal: painfree during sitting and driving  [x] Progressing: [] Met: [] Not Met: [] Adjusted    Therapist goals for Patient:   Short Term Goals: To be achieved in: 2 weeks  1. Independent in HEP and progression per patient tolerance, in order to prevent re-injury. [] Progressing: [x] Met: [] Not Met: [] Adjusted  2. Patient will have a decrease in pain to facilitate improvement in movement, function, and ADLs as indicated by Functional Deficits. [x] Progressing: [] Met: [] Not Met: [] Adjusted    Long Term Goals: To be achieved in: 8 weeks  1. Disability index score of 68 or better for Foto and 50/80 or better for the LEFS to assist with reaching prior level of function.    [] Progressing: [x] Met: [] Not Met: [] Adjusted  2. Patient will demonstrate increased AROM to L hip IR to 20 degrees to allow for proper joint functioning as indicated by patients Functional Deficits. [] Progressing: [x] Met: [] Not Met: [] Adjusted  3. Patient will demonstrate an increase in Strength to good proximal hip strength and control, within 5lb HHD in LE to allow for proper functional mobility as indicated by patients Functional Deficits. [x] Progressing: [] Met: [] Not Met: [] Adjusted  4. Patient will return to sitting work and driving activities without increased symptoms or restriction. [x] Progressing: [] Met: [] Not Met: [] Adjusted  5. Patient will report decreased pain with standing and walking dogs. (patient specific functional goal)    [] Progressing: [x] Met: [] Not Met: [] Adjusted     ASSESSMENT:  Patient has been seen for 10 visits of physical therapy to address L knee pain. Patient has been making good improvement in knee and hip ROM, as well as strength. Still has some issues with tightness in L adductors and discomfort in L knee with sitting for prolonged period of time. Will benefit from continued skilled PT services 1x week for 3-4 visits to further progress strength. Return to Play: (if applicable)   []  Stage 1: Intro to Strength   []  Stage 2: Return to Run and Strength   []  Stage 3: Return to Jump and Strength   []  Stage 4: Dynamic Strength and Agility   []  Stage 5: Sport Specific Training     []  Ready to Return to Play, Meets All Above Stages   []  Not Ready for Return to Sports   Comments:            Treatment/Activity Tolerance:  [x] Patient tolerated treatment well [] Patient limited by fatique  [] Patient limited by pain  [] Patient limited by other medical complications  [] Other:     Overall Progression Towards Functional goals/ Treatment Progress Update:  [x] Patient is progressing as expected towards functional goals listed.     [] Progression is slowed due to complexities/Impairments listed. [] Progression has been slowed due to co-morbidities. [] Plan just implemented, too soon to assess goals progression <30days   [] Goals require adjustment due to lack of progress  [] Patient is not progressing as expected and requires additional follow up with physician  [] Other    Prognosis for POC: [x] Good [] Fair  [] Poor    Patient requires continued skilled intervention: [x] Yes  [] No        PLAN:   [x] Continue per plan of care [] Alter current plan (see comments)   []Plan of care initiated [] Hold pending MD visit [] Discharge    Electronically signed by: Chikis Hebert PT    Note: If patient does not return for scheduled/recommended follow up visits, this note will serve as a discharge from care along with the most recent update on progress.

## 2023-01-11 ENCOUNTER — HOSPITAL ENCOUNTER (OUTPATIENT)
Dept: PHYSICAL THERAPY | Age: 68
Setting detail: THERAPIES SERIES
Discharge: HOME | End: 2023-01-11
Payer: MEDICARE

## 2023-01-11 PROCEDURE — 97140 MANUAL THERAPY 1/> REGIONS: CPT

## 2023-01-11 PROCEDURE — 97110 THERAPEUTIC EXERCISES: CPT

## 2023-01-11 NOTE — FLOWSHEET NOTE
[address] [address] [place]  Phone: [phone] Fax: [phone]        Physical Therapy Treatment Note/ Progress Report:       Date:  2023    Patient Name:  Raleigh Reyes    :  1955  MRN: [id number]  Restrictions/Precautions:    Medical/Treatment Diagnosis Information:  Diagnosis: L knee pain, L knee MMT  Treatment Diagnosis: L knee MMT S83.242A, L knee pain Y30.503  Insurance/Certification information:  PT Insurance Information: Medicare/AARP  Physician Information:  Eulalio Flannery, [address] [facility] signed (Y/N):     Date of Patient follow up with Physician:      Progress Report: [x]  Yes  []  No     Date Range for reporting period:  Beginnin/3/2023  Ending: -    Progress report due (10 Rx/or 30 days whichever is less): 9555    Recertification due (POC duration/ or 90 days whichever is less): 2/3/2023    Visit # Insurance Allowable Auth Needed   11 Medicare/AARP []Yes    []No     Latex Allergy:  [x]NO      []YES  Preferred Language for Healthcare:   [x]English       []other:  Functional Scale: Foto 70 , LEFS 62 Date assessed:1/3/2023    Pain level:  0/10     SUBJECTIVE:  Patient reports that his knee continues to feel better since changing out his shoes and getting new inserts. Not having near as much soreness. Has used his new inserts a few days while walking the dogs; has a little blister on foot today.      OBJECTIVE:   Observation:   Test measurements:    ROM LEFT RIGHT   HIP Flex 125    HIP Abd      HIP Ext      HIP IR 20    HIP ER 55    Knee ext 0    Knee Flex 140    Strength  LEFT RIGHT   HIP Flexors 27.6 28.2   HIP Abductors 25.3 29.4   HIP Ext 28.4 28.5   Hip ER       Knee EXT (quad) 26.7 32.0   Knee Flex (HS) 27.9 28.9       RESTRICTIONS/PRECAUTIONS: none    Exercises/Interventions:     Therapeutic Ex (23170)   Min: 26 Sets/sec Reps Notes/CUES   Retro Stepper/BIKE      Alter G      BFR Sportcord March      SLR 0     SAQ 0  Full roll, 3lb   Knee extension cybex- multi angle isometrics 5sec 10/ea More tender at 30-45*   H/s curl cybex 1 15 20 lb, 2 to 1 eccentric   BOSU fwdlunge 10 15 L   SC march 4 15 Yellow SC, SBA    Wall sit- cues heel 5sec 20 80-90 degrees   Leg Press Iso/Con/Ecc 0-60 5sec 15 Bilat, 2 to 1 eccentrics, 90 lb   Hip Abd with eccentric control      Hip Add with eccentric control      Glute side walks 2 15 Red band above knees   Slide Lunge 0                                               Manual Intervention (01539)  Min: 14      Knee mobs/PROM      Hip IR stretching and log roll for hip IR  0    Patella Mobs  3    SL quad stretch and hip flexor stretch 0  L   STM adductors, medial joint line, medial hamstring, patellar tendon  5    L hip belt mobs  6    NMR re-education (49031)  Min:   CUES NEEDED   Bruneian/Biofeedback 10/10      BFR      G. Med activation      Hip Ext full ROM/ G. Activation      Bosu Bal and Prop- G Med      Single leg stance/Balance/Prop      Bosu Retro G. Med act      Prone Hip froggers- sliders/elevated            Therapeutic Activity (58380)  Min:      Ladders      Plyos      Dynamic Balance                            Therapeutic Exercise and NMR EXR  [x] (69585) Provided verbal/tactile cueing for activities related to strengthening, flexibility, endurance, ROM for improvements in LE, proximal hip, and core control with self care, mobility, lifting, ambulation. [x] (35609) Provided verbal/tactile cueing for activities related to improving balance, coordination, kinesthetic sense, posture, motor skill, proprioception  to assist with LE, proximal hip, and core control in self care, mobility, lifting, ambulation and eccentric single leg control.      NMR and Therapeutic Activities:    [x] (38671 or 40477) Provided verbal/tactile cueing for activities related to improving balance, coordination, kinesthetic sense, posture, motor skill, proprioception and motor activation to allow for proper function of core, proximal hip and LE with self care and ADLs and functional mobility.   [] (43100) Gait Re-education- Provided training and instruction to the patient for proper LE, core and proximal hip recruitment and positioning and eccentric body weight control with ambulation re-education including up and down stairs     Home Exercise Program:    [x] (35103) Reviewed/Progressed HEP activities related to strengthening, flexibility, endurance, ROM of core, proximal hip and LE for functional self-care, mobility, lifting and ambulation/stair navigation   [] (97595)Reviewed/Progressed HEP activities related to improving balance, coordination, kinesthetic sense, posture, motor skill, proprioception of core, proximal hip and LE for self care, mobility, lifting, and ambulation/stair navigation      Manual Treatments:  PROM / STM / Oscillations-Mobs:  G-I, II, III, IV (PA's, Inf., Post.)  [x] (01998) Provided manual therapy to mobilize LE, proximal hip and/or LS spine soft tissue/joints for the purpose of modulating pain, promoting relaxation,  increasing ROM, reducing/eliminating soft tissue swelling/inflammation/restriction, improving soft tissue extensibility and allowing for proper ROM for normal function with self care, mobility, lifting and ambulation. Modalities:     [] GAME READY (VASO)- for significant edema, swelling, pain control.      Charges:  Timed Code Treatment Minutes: 40   Total Treatment Minutes: 45      [] EVAL (LOW) 66711 (typically 20 minutes face-to-face)  [] EVAL (MOD) 63116 (typically 30 minutes face-to-face)  [] EVAL (HIGH) 34057 (typically 45 minutes face-to-face)  [] RE-EVAL     [x] ET(54270) x  2   [] DRY NEEDLE 1 OR 2 MUSCLES  [] NMR (86881) x     [] DRY NEEDLE 3+ MUSCLES  [x] Manual (55819) x   1    [] TA (40489) x     [] Mech Traction (35607)  [] ES(attended) (26241)     [] ES (un) (98977):   [] VASO (41886)  [] Other:    If [facility] Please Indicate Time In/Out  CPT Code Time in Time out                                   GOALS:  Patient stated goal: painfree during sitting and driving  [x] Progressing: [] Met: [] Not Met: [] Adjusted    Therapist goals for Patient:   Short Term Goals: To be achieved in: 2 weeks  1. Independent in HEP and progression per patient tolerance, in order to prevent re-injury. [] Progressing: [x] Met: [] Not Met: [] Adjusted  2. Patient will have a decrease in pain to facilitate improvement in movement, function, and ADLs as indicated by Functional Deficits. [x] Progressing: [] Met: [] Not Met: [] Adjusted    Long Term Goals: To be achieved in: 8 weeks  1. Disability index score of 68 or better for Foto and 50/80 or better for the LEFS to assist with reaching prior level of function. [] Progressing: [x] Met: [] Not Met: [] Adjusted  2. Patient will demonstrate increased AROM to L hip IR to 20 degrees to allow for proper joint functioning as indicated by patients Functional Deficits. [] Progressing: [x] Met: [] Not Met: [] Adjusted  3. Patient will demonstrate an increase in Strength to good proximal hip strength and control, within 5lb HHD in LE to allow for proper functional mobility as indicated by patients Functional Deficits. [x] Progressing: [] Met: [] Not Met: [] Adjusted  4. Patient will return to sitting work and driving activities without increased symptoms or restriction. [x] Progressing: [] Met: [] Not Met: [] Adjusted  5. Patient will report decreased pain with standing and walking dogs. (patient specific functional goal)    [] Progressing: [x] Met: [] Not Met: [] Adjusted     ASSESSMENT:  Patient with less soft tissue restriction in adductors and along medial knee; minimal at medial distal hamstring today. Patient with improved tolerance to exercises and able to progress hamstring and quad strengthening further today. Fatigue noted more with 45 degrees knee flexion with exercises.  Will benefit from continued skilled PT services 1x week for 3-4 visits to further progress strength, may be ready to taper to every other week. Return to Play: (if applicable)   []  Stage 1: Intro to Strength   []  Stage 2: Return to Run and Strength   []  Stage 3: Return to Jump and Strength   []  Stage 4: Dynamic Strength and Agility   []  Stage 5: Sport Specific Training     []  Ready to Return to Play, Meets All Above Stages   []  Not Ready for Return to Sports   Comments:            Treatment/Activity Tolerance:  [x] Patient tolerated treatment well [] Patient limited by fatique  [] Patient limited by pain  [] Patient limited by other medical complications  [] Other:     Overall Progression Towards Functional goals/ Treatment Progress Update:  [x] Patient is progressing as expected towards functional goals listed. [] Progression is slowed due to complexities/Impairments listed. [] Progression has been slowed due to co-morbidities. [] Plan just implemented, too soon to assess goals progression <30days   [] Goals require adjustment due to lack of progress  [] Patient is not progressing as expected and requires additional follow up with physician  [] Other    Prognosis for POC: [x] Good [] Fair  [] Poor    Patient requires continued skilled intervention: [x] Yes  [] No        PLAN:   [x] Continue per plan of care [] Alter current plan (see comments)   []Plan of care initiated [] Hold pending MD visit [] Discharge    Electronically signed by: Dorie Vigil PT    Note: If patient does not return for scheduled/recommended follow up visits, this note will serve as a discharge from care along with the most recent update on progress.

## 2023-01-18 ENCOUNTER — HOSPITAL ENCOUNTER (OUTPATIENT)
Dept: PHYSICAL THERAPY | Age: 68
Setting detail: THERAPIES SERIES
Discharge: HOME | End: 2023-01-18
Payer: MEDICARE

## 2023-01-18 PROCEDURE — 97110 THERAPEUTIC EXERCISES: CPT

## 2023-01-18 PROCEDURE — 97140 MANUAL THERAPY 1/> REGIONS: CPT

## 2023-01-18 NOTE — FLOWSHEET NOTE
[facility] [address] [facility]  Phone: [phone] Fax: [phone]        Physical Therapy Treatment Note/ Progress Report:       Date:  2023    Patient Name:  Kiley Luz    :  1955  MRN: [id number]  Restrictions/Precautions:    Medical/Treatment Diagnosis Information:  Diagnosis: L knee pain, L knee MMT  Treatment Diagnosis: L knee MMT S83.242A, L knee pain K14.434  Insurance/Certification information:  PT Insurance Information: Medicare/AARP  Physician Information:  Miriam Copeland, [address] [facility] signed (Y/N):     Date of Patient follow up with Physician:      Progress Report: [x]  Yes  []  No     Date Range for reporting period:  Beginnin/3/2023  Ending: -    Progress report due (10 Rx/or 30 days whichever is less): 9913    Recertification due (POC duration/ or 90 days whichever is less): 2/3/2023    Visit # Insurance Allowable Auth Needed   12 Medicare/AARP []Yes    []No     Latex Allergy:  [x]NO      []YES  Preferred Language for Healthcare:   [x]English       []other:  Functional Scale: Foto 70 , LEFS 62 Date assessed:1/3/2023    Pain level:  0/10     SUBJECTIVE:  Patient reports that his knee continues to feel better. No soreness after last session. Has walked 3 days in a row without issue. No real soreness with drive into PT today. Feeling ready to decrease to every other week.       OBJECTIVE:   Observation:   Test measurements:    ROM LEFT RIGHT   HIP Flex 125    HIP Abd      HIP Ext      HIP IR 20    HIP ER 55    Knee ext 0    Knee Flex 140    Strength  LEFT RIGHT   HIP Flexors 27.6 28.2   HIP Abductors 25.3 29.4   HIP Ext 28.4 28.5   Hip ER       Knee EXT (quad) 26.7 32.0   Knee Flex (HS) 27.9 28.9       RESTRICTIONS/PRECAUTIONS: none    Exercises/Interventions:     Therapeutic Ex (41953)   Min: 26 Sets/sec Reps Notes/CUES   Retro Stepper/BIKE      Mai G      BFR      Sportco SLR 0     SAQ 0  Full roll, 3lb   Knee extension cybex- multi angle isometrics 5sec 10/ea More tender at 30-45*   H/s curl cybex 1 15 20 lb, 2 to 1 eccentric   BOSU fwdlunge 10 15 L   SC march 4 15 Yellow SC, SBA    Wall sit- cues heel 5sec 20 80-90 degrees   Leg Press Iso/Con/Ecc 0-60 5sec 15 Bilat, 2 to 1 eccentrics, 70 lb   Hip Abd with eccentric control      Hip Add with eccentric control      Glute side walks 2 15 Red band above knees   Slide Lunge 0                                               Manual Intervention (79355)  Min: 14      Knee mobs/PROM      Hip IR stretching and log roll for hip IR  0    Patella Mobs  3    SL quad stretch and hip flexor stretch 0  L   STM adductors, medial joint line, medial hamstring, patellar tendon  5    L hip belt mobs  6    NMR re-education (29688)  Min:   CUES NEEDED   Mexican/Biofeedback 10/10      BFR      G. Med activation      Hip Ext full ROM/ G. Activation      Bosu Bal and Prop- G Med      Single leg stance/Balance/Prop      Bosu Retro G. Med act      Prone Hip froggers- sliders/elevated            Therapeutic Activity (13885)  Min:      Ladders      Plyos      Dynamic Balance                            Therapeutic Exercise and NMR EXR  [x] (22674) Provided verbal/tactile cueing for activities related to strengthening, flexibility, endurance, ROM for improvements in LE, proximal hip, and core control with self care, mobility, lifting, ambulation. [x] (81705) Provided verbal/tactile cueing for activities related to improving balance, coordination, kinesthetic sense, posture, motor skill, proprioception  to assist with LE, proximal hip, and core control in self care, mobility, lifting, ambulation and eccentric single leg control.      NMR and Therapeutic Activities:    [x] (22131 or 80269) Provided verbal/tactile cueing for activities related to improving balance, coordination, kinesthetic sense, posture, motor skill, proprioception and motor activation to allow for proper function of core, proximal hip and LE with self care and ADLs and functional mobility.   [] (67825) Gait Re-education- Provided training and instruction to the patient for proper LE, core and proximal hip recruitment and positioning and eccentric body weight control with ambulation re-education including up and down stairs     Home Exercise Program:    [x] (07416) Reviewed/Progressed HEP activities related to strengthening, flexibility, endurance, ROM of core, proximal hip and LE for functional self-care, mobility, lifting and ambulation/stair navigation   [] (26039)Reviewed/Progressed HEP activities related to improving balance, coordination, kinesthetic sense, posture, motor skill, proprioception of core, proximal hip and LE for self care, mobility, lifting, and ambulation/stair navigation      Manual Treatments:  PROM / STM / Oscillations-Mobs:  G-I, II, III, IV (PA's, Inf., Post.)  [x] (18268) Provided manual therapy to mobilize LE, proximal hip and/or LS spine soft tissue/joints for the purpose of modulating pain, promoting relaxation,  increasing ROM, reducing/eliminating soft tissue swelling/inflammation/restriction, improving soft tissue extensibility and allowing for proper ROM for normal function with self care, mobility, lifting and ambulation. Modalities:     [] GAME READY (VASO)- for significant edema, swelling, pain control.      Charges:  Timed Code Treatment Minutes: 40   Total Treatment Minutes: 45      [] EVAL (LOW) 82412 (typically 20 minutes face-to-face)  [] EVAL (MOD) 89935 (typically 30 minutes face-to-face)  [] EVAL (HIGH) 46727 (typically 45 minutes face-to-face)  [] RE-EVAL     [x] FK(25488) x  2   [] DRY NEEDLE 1 OR 2 MUSCLES  [] NMR (28398) x     [] DRY NEEDLE 3+ MUSCLES  [x] Manual (39093) x   1    [] TA (28907) x     [] Mech Traction (16437)  [] ES(attended) (87102)     [] ES (un) (80930):   [] VASO (60008)  [] Other:    If [facility] Please Indicate Time In/Out  CPT Code Time in Time out                                   GOALS:  Patient stated goal: painfree during sitting and driving  [x] Progressing: [] Met: [] Not Met: [] Adjusted    Therapist goals for Patient:   Short Term Goals: To be achieved in: 2 weeks  1. Independent in HEP and progression per patient tolerance, in order to prevent re-injury. [] Progressing: [x] Met: [] Not Met: [] Adjusted  2. Patient will have a decrease in pain to facilitate improvement in movement, function, and ADLs as indicated by Functional Deficits. [x] Progressing: [] Met: [] Not Met: [] Adjusted    Long Term Goals: To be achieved in: 8 weeks  1. Disability index score of 68 or better for Foto and 50/80 or better for the LEFS to assist with reaching prior level of function. [] Progressing: [x] Met: [] Not Met: [] Adjusted  2. Patient will demonstrate increased AROM to L hip IR to 20 degrees to allow for proper joint functioning as indicated by patients Functional Deficits. [] Progressing: [x] Met: [] Not Met: [] Adjusted  3. Patient will demonstrate an increase in Strength to good proximal hip strength and control, within 5lb HHD in LE to allow for proper functional mobility as indicated by patients Functional Deficits. [x] Progressing: [] Met: [] Not Met: [] Adjusted  4. Patient will return to sitting work and driving activities without increased symptoms or restriction. [x] Progressing: [] Met: [] Not Met: [] Adjusted  5. Patient will report decreased pain with standing and walking dogs. (patient specific functional goal)    [] Progressing: [x] Met: [] Not Met: [] Adjusted     ASSESSMENT:  Patient with less soft tissue restriction in adductors and along medial knee and minimal restriction in hip IR. Good improvement in all hip mobility and soft tissue mobility. Good tolerance to all strengthening. Ready to decrease frequency to every other week.      Return to Play: (if applicable)   []  Stage 1: Intro to Strength   []  Stage 2: Return to Run and Strength   []  Stage 3: Return to Jump and Strength   []  Stage 4: Dynamic Strength and Agility   []  Stage 5: Sport Specific Training     []  Ready to Return to Play, Meets All Above Stages   []  Not Ready for Return to Sports   Comments:            Treatment/Activity Tolerance:  [x] Patient tolerated treatment well [] Patient limited by fatique  [] Patient limited by pain  [] Patient limited by other medical complications  [] Other:     Overall Progression Towards Functional goals/ Treatment Progress Update:  [x] Patient is progressing as expected towards functional goals listed. [] Progression is slowed due to complexities/Impairments listed. [] Progression has been slowed due to co-morbidities. [] Plan just implemented, too soon to assess goals progression <30days   [] Goals require adjustment due to lack of progress  [] Patient is not progressing as expected and requires additional follow up with physician  [] Other    Prognosis for POC: [x] Good [] Fair  [] Poor    Patient requires continued skilled intervention: [x] Yes  [] No        PLAN:   [x] Continue per plan of care [] Alter current plan (see comments)   []Plan of care initiated [] Hold pending MD visit [] Discharge    Electronically signed by: Yusra Leach PT    Note: If patient does not return for scheduled/recommended follow up visits, this note will serve as a discharge from care along with the most recent update on progress.

## 2023-02-01 ENCOUNTER — HOSPITAL ENCOUNTER (OUTPATIENT)
Dept: PHYSICAL THERAPY | Age: 68
Setting detail: THERAPIES SERIES
Discharge: HOME | End: 2023-02-01
Payer: MEDICARE

## 2023-02-01 PROCEDURE — 97140 MANUAL THERAPY 1/> REGIONS: CPT

## 2023-02-01 PROCEDURE — 97110 THERAPEUTIC EXERCISES: CPT

## 2023-02-01 NOTE — FLOWSHEET NOTE
[facility] [address]  Phone: [phone] Fax: [phone]        Physical Therapy Treatment Note/ Progress Report:       Date:  2023    Patient Name:  Shakeel Salgado    :  1955  MRN: [id number]  Restrictions/Precautions:    Medical/Treatment Diagnosis Information:  Diagnosis: L knee pain, L knee MMT  Treatment Diagnosis: L knee MMT S83.242A, L knee pain M25.562  Insurance/Certification information:  PT Insurance Information: Medicare/AARP  Physician Information:  Edi Quintero, *  Plan of care signed (Y/N):     Date of Patient follow up with Physician:      Progress Report: [x]  Yes  []  No     Date Range for reporting period:  Beginnin/3/2023  Ending: -    Progress report due (10 Rx/or 30 days whichever is less): 2/3/2023    Recertification due (POC duration/ or 90 days whichever is less): 2/3/2023    Visit # Insurance Allowable Auth Needed   13 Medicare/AARP []Yes    []No     Latex Allergy:  [x]NO      []YES  Preferred Language for Healthcare:   [x]English       []other:  Functional Scale: Foto 70 , LEFS 62 Date assessed:1/3/2023    Pain level:  0/10     SUBJECTIVE:  Patient reports that his knee continues to feel better. No issues recently with walking this past week. Also, having less soreness with sitting and driving for longer periods of time. Patient feels good with every other week- doesn't feel quite all the way back to his normal yet.     OBJECTIVE:   Observation:   Test measurements:    ROM LEFT RIGHT   HIP Flex 125    HIP Abd      HIP Ext      HIP IR 20    HIP ER 55    Knee ext 0    Knee Flex 140    Strength  LEFT RIGHT   HIP Flexors 27.6 28.2   HIP Abductors 25.3 29.4   HIP Ext 28.4 28.5   Hip ER       Knee EXT (quad) 26.7 32.0   Knee Flex (HS) 27.9 28.9       RESTRICTIONS/PRECAUTIONS: none    Exercises/Interventions:     Therapeutic Ex (76599)   Min: 26 Sets/sec Reps Notes/CUES   Retro  Stepper/BIKE      Alter G      BFR      Sportcord March      SLR 0     SAQ 0  Full roll, 3lb   Knee extension cybex- multi angle isometrics 5sec 10/ea Less sore at 45* today, challenged at 60*   H/s curl cybex 1 15 20 lb, 2 to 1 eccentric   BOSU fwdlunge 10 15 L   SC march 4 15 Yellow SC, SBA    Wall sit- cues heel 5sec 20 80-90 degrees   Leg Press Iso/Con/Ecc 0-60 5sec 15 Bilat, 2 to 1 eccentrics, 70 lb   Hip Abd with eccentric control      Hip Add with eccentric control      Glute side walks 2 15 Red band above knees   Slide Lunge 0                                               Manual Intervention (46821)  Min: 14      Knee mobs/PROM      Hip IR stretching and log roll for hip IR  0    Patella Mobs  3    SL quad stretch and hip flexor stretch 0  L   STM adductors, medial joint line, medial hamstring, patellar tendon  5    L hip belt mobs  6    NMR re-education (04032)  Min:   CUES NEEDED   Salvadorean/Biofeedback 10/10      BFR      G. Med activation      Hip Ext full ROM/ G. Activation      Bosu Bal and Prop- G Med      Single leg stance/Balance/Prop      Bosu Retro G. Med act      Prone Hip froggers- sliders/elevated            Therapeutic Activity (52936)  Min:      Ladders      Plyos      Dynamic Balance                            Therapeutic Exercise and NMR EXR  [x] (64517) Provided verbal/tactile cueing for activities related to strengthening, flexibility, endurance, ROM for improvements in LE, proximal hip, and core control with self care, mobility, lifting, ambulation. [x] (90994) Provided verbal/tactile cueing for activities related to improving balance, coordination, kinesthetic sense, posture, motor skill, proprioception  to assist with LE, proximal hip, and core control in self care, mobility, lifting, ambulation and eccentric single leg control.      NMR and Therapeutic Activities:    [x] (87309 or 80590) Provided verbal/tactile cueing for activities related to improving balance, coordination, kinesthetic sense, posture, motor skill, proprioception and motor activation to allow for proper function of core, proximal hip and LE with self care and ADLs and functional mobility.   [] (42389) Gait Re-education- Provided training and instruction to the patient for proper LE, core and proximal hip recruitment and positioning and eccentric body weight control with ambulation re-education including up and down stairs     Home Exercise Program:    [x] (48376) Reviewed/Progressed HEP activities related to strengthening, flexibility, endurance, ROM of core, proximal hip and LE for functional self-care, mobility, lifting and ambulation/stair navigation   [] (38854)Reviewed/Progressed HEP activities related to improving balance, coordination, kinesthetic sense, posture, motor skill, proprioception of core, proximal hip and LE for self care, mobility, lifting, and ambulation/stair navigation      Manual Treatments:  PROM / STM / Oscillations-Mobs:  G-I, II, III, IV (PA's, Inf., Post.)  [x] (79660) Provided manual therapy to mobilize LE, proximal hip and/or LS spine soft tissue/joints for the purpose of modulating pain, promoting relaxation,  increasing ROM, reducing/eliminating soft tissue swelling/inflammation/restriction, improving soft tissue extensibility and allowing for proper ROM for normal function with self care, mobility, lifting and ambulation. Modalities:     [] GAME READY (VASO)- for significant edema, swelling, pain control.      Charges:  Timed Code Treatment Minutes: 40   Total Treatment Minutes: 45      [] EVAL (LOW) 64734 (typically 20 minutes face-to-face)  [] EVAL (MOD) 93860 (typically 30 minutes face-to-face)  [] EVAL (HIGH) 16278 (typically 45 minutes face-to-face)  [] RE-EVAL     [x] OU(56798) x  2   [] DRY NEEDLE 1 OR 2 MUSCLES  [] NMR (19842) x     [] DRY NEEDLE 3+ MUSCLES  [x] Manual (70973) x   1    [] TA (76262) x     [] Mech Traction (48131)  [] ES(attended) (18819)     [] ES (un) (50456):   [] VASO (63975)  [] Other:    If [facility] Please Indicate Time In/Out  CPT Code Time in Time out                                   GOALS:  Patient stated goal: painfree during sitting and driving  [x] Progressing: [] Met: [] Not Met: [] Adjusted    Therapist goals for Patient:   Short Term Goals: To be achieved in: 2 weeks  1. Independent in HEP and progression per patient tolerance, in order to prevent re-injury. [] Progressing: [x] Met: [] Not Met: [] Adjusted  2. Patient will have a decrease in pain to facilitate improvement in movement, function, and ADLs as indicated by Functional Deficits. [x] Progressing: [] Met: [] Not Met: [] Adjusted    Long Term Goals: To be achieved in: 8 weeks  1. Disability index score of 68 or better for Foto and 50/80 or better for the LEFS to assist with reaching prior level of function. [] Progressing: [x] Met: [] Not Met: [] Adjusted  2. Patient will demonstrate increased AROM to L hip IR to 20 degrees to allow for proper joint functioning as indicated by patients Functional Deficits. [] Progressing: [x] Met: [] Not Met: [] Adjusted  3. Patient will demonstrate an increase in Strength to good proximal hip strength and control, within 5lb HHD in LE to allow for proper functional mobility as indicated by patients Functional Deficits. [x] Progressing: [] Met: [] Not Met: [] Adjusted  4. Patient will return to sitting work and driving activities without increased symptoms or restriction. [x] Progressing: [] Met: [] Not Met: [] Adjusted  5. Patient will report decreased pain with standing and walking dogs. (patient specific functional goal)    [] Progressing: [x] Met: [] Not Met: [] Adjusted     ASSESSMENT:  Patient with less soft tissue restriction in adductors and along medial knee and minimal restriction in hip IR. Good improvement in all hip mobility and soft tissue mobility. Good tolerance to all strengthening. Fatigued at conclusion in quads.       Return to Play: (if applicable)   []  Stage 1: Intro to Strength   []  Stage 2: Return to Run and Strength   []  Stage 3: Return to Jump and Strength   []  Stage 4: Dynamic Strength and Agility   []  Stage 5: Sport Specific Training     []  Ready to Return to Play, Meets All Above Stages   []  Not Ready for Return to Sports   Comments:            Treatment/Activity Tolerance:  [x] Patient tolerated treatment well [] Patient limited by fatique  [] Patient limited by pain  [] Patient limited by other medical complications  [] Other:     Overall Progression Towards Functional goals/ Treatment Progress Update:  [x] Patient is progressing as expected towards functional goals listed. [] Progression is slowed due to complexities/Impairments listed. [] Progression has been slowed due to co-morbidities. [] Plan just implemented, too soon to assess goals progression <30days   [] Goals require adjustment due to lack of progress  [] Patient is not progressing as expected and requires additional follow up with physician  [] Other    Prognosis for POC: [x] Good [] Fair  [] Poor    Patient requires continued skilled intervention: [x] Yes  [] No        PLAN:   [x] Continue per plan of care [] Alter current plan (see comments)   []Plan of care initiated [] Hold pending MD visit [] Discharge    Electronically signed by: Saeid Arrington PT    Note: If patient does not return for scheduled/recommended follow up visits, this note will serve as a discharge from care along with the most recent update on progress.

## 2023-02-15 ENCOUNTER — HOSPITAL ENCOUNTER (OUTPATIENT)
Dept: PHYSICAL THERAPY | Age: 68
Setting detail: THERAPIES SERIES
Discharge: HOME | End: 2023-02-15
Payer: MEDICARE

## 2023-02-15 PROCEDURE — 97140 MANUAL THERAPY 1/> REGIONS: CPT

## 2023-02-15 PROCEDURE — 97110 THERAPEUTIC EXERCISES: CPT

## 2023-02-15 NOTE — FLOWSHEET NOTE
[place] [address]  Phone: [phone] Fax: [phone]        Physical Therapy Treatment Note/ Progress Report:       Date:  02/15/2023    Patient Name:  Ferdinand Lawson    :  1955  MRN: [id number]  Restrictions/Precautions:    Medical/Treatment Diagnosis Information:  Diagnosis: L knee pain, L knee MMT  Treatment Diagnosis: L knee MMT S83.242A, L knee pain I58.893  Insurance/Certification information:  PT Insurance Information: Medicare/AARP  Physician Information:  Lewis Dumont, [address] of [facility] signed (Y/N):     Date of Patient follow up with Physician:      Progress Report: [x]  Yes  []  No     Date Range for reporting period:  Beginnin/3/2023  Ending: -    Progress report due (10 Rx/or 30 days whichever is less): 3/3/4077    Recertification due (POC duration/ or 90 days whichever is less): 2/3/2023    Visit # Insurance Allowable Auth Needed   14 Medicare/AARP []Yes    []No     Latex Allergy:  [x]NO      []YES  Preferred Language for Healthcare:   [x]English       []other:  Functional Scale: Foto 70 , LEFS 62 Date assessed:1/3/2023    Pain level:  0/10     SUBJECTIVE:  Patient reports that his knee continues to feel better. Hasn't been having as much issues with his knee lately. States that he has gotten new orthotics and feels like he is no longer wearing the outside of his shoes. Does note a little bit of wearing on heel of shoes. Will have a long drive tomorrow, so is interested to see if he will have pain with this.       OBJECTIVE:   Observation:   Test measurements:    ROM LEFT RIGHT   HIP Flex 125    HIP Abd      HIP Ext      HIP IR 20    HIP ER 55    Knee ext 0    Knee Flex 140    Strength  LEFT RIGHT   HIP Flexors 27.6 28.2   HIP Abductors 25.3 29.4   HIP Ext 28.4 28.5   Hip ER       Knee EXT (quad) 26.7 32.0   Knee Flex (HS) 27.9 28.9       RESTRICTIONS/PRECAUTIONS: none    Exercises/Interventions:     Therapeutic Ex (07144)   Min: 26 Sets/sec Reps Notes/CUES   Retro Stepper/BIKE      Alter G      BFR      Sportcord March      SLR 0     SAQ 0  Full roll, 3lb   Knee extension cybex- multi angle isometrics 5sec 10/ea Less sore at 45* today, challenged at 60*   H/s curl cybex 1 15 20 lb, 2 to 1 eccentric   BOSU fwdlunge 10 15 L   SC march 4 15 Yellow SC, SBA    Wall sit- cues heel 5sec 20 80-90 degrees   Leg Press Iso/Con/Ecc 0-60 5sec 15 Bilat, 2 to 1 eccentrics, 70 lb   Hip Abd with eccentric control      Hip Add with eccentric control      Glute side walks 2 15 green band ankles   Slide Lunge 0                                               Manual Intervention (70217)  Min: 14      Knee mobs/PROM      Hip IR stretching and log roll for hip IR  0    Patella Mobs  3    SL quad stretch and hip flexor stretch 0  L   STM adductors, medial joint line, medial hamstring, patellar tendon  5    L hip belt mobs  6    NMR re-education (95867)  Min:   CUES NEEDED   Beninese/Biofeedback 10/10      BFR      G. Med activation      Hip Ext full ROM/ G. Activation      Bosu Bal and Prop- G Med      Single leg stance/Balance/Prop      Bosu Retro G. Med act      Prone Hip froggers- sliders/elevated            Therapeutic Activity (09923)  Min:      Ladders      Plyos      Dynamic Balance                            Therapeutic Exercise and NMR EXR  [x] (74980) Provided verbal/tactile cueing for activities related to strengthening, flexibility, endurance, ROM for improvements in LE, proximal hip, and core control with self care, mobility, lifting, ambulation. [x] (07385) Provided verbal/tactile cueing for activities related to improving balance, coordination, kinesthetic sense, posture, motor skill, proprioception  to assist with LE, proximal hip, and core control in self care, mobility, lifting, ambulation and eccentric single leg control.      NMR and Therapeutic Activities:    [x] (05403 or 38647) Provided verbal/tactile cueing for activities related to improving balance, coordination, kinesthetic sense, posture, motor skill, proprioception and motor activation to allow for proper function of core, proximal hip and LE with self care and ADLs and functional mobility.   [] (71820) Gait Re-education- Provided training and instruction to the patient for proper LE, core and proximal hip recruitment and positioning and eccentric body weight control with ambulation re-education including up and down stairs     Home Exercise Program:    [x] (64569) Reviewed/Progressed HEP activities related to strengthening, flexibility, endurance, ROM of core, proximal hip and LE for functional self-care, mobility, lifting and ambulation/stair navigation   [] (41322)Reviewed/Progressed HEP activities related to improving balance, coordination, kinesthetic sense, posture, motor skill, proprioception of core, proximal hip and LE for self care, mobility, lifting, and ambulation/stair navigation      Manual Treatments:  PROM / STM / Oscillations-Mobs:  G-I, II, III, IV (PA's, Inf., Post.)  [x] (52149) Provided manual therapy to mobilize LE, proximal hip and/or LS spine soft tissue/joints for the purpose of modulating pain, promoting relaxation,  increasing ROM, reducing/eliminating soft tissue swelling/inflammation/restriction, improving soft tissue extensibility and allowing for proper ROM for normal function with self care, mobility, lifting and ambulation. Modalities:     [] GAME READY (VASO)- for significant edema, swelling, pain control.      Charges:  Timed Code Treatment Minutes: 40   Total Treatment Minutes: 45      [] EVAL (LOW) 00465 (typically 20 minutes face-to-face)  [] EVAL (MOD) 52542 (typically 30 minutes face-to-face)  [] EVAL (HIGH) 93667 (typically 45 minutes face-to-face)  [] RE-EVAL     [x] DA(09160) x  2   [] DRY NEEDLE 1 OR 2 MUSCLES  [] NMR (02305) x     [] DRY NEEDLE 3+ MUSCLES  [x] Manual (82737) x   1    [] TA (52832) x     [] [facility] Traction (56388)  [] ES(attended) (74211)     [] ES (un) (91295):   [] VASO (30982)  [] Other:    If [facility] Please Indicate Time In/Out  CPT Code Time in Time out                                   GOALS:  Patient stated goal: painfree during sitting and driving  [x] Progressing: [] Met: [] Not Met: [] Adjusted    Therapist goals for Patient:   Short Term Goals: To be achieved in: 2 weeks  1. Independent in HEP and progression per patient tolerance, in order to prevent re-injury. [] Progressing: [x] Met: [] Not Met: [] Adjusted  2. Patient will have a decrease in pain to facilitate improvement in movement, function, and ADLs as indicated by Functional Deficits. [x] Progressing: [] Met: [] Not Met: [] Adjusted    Long Term Goals: To be achieved in: 8 weeks  1. Disability index score of 68 or better for Foto and 50/80 or better for the LEFS to assist with reaching prior level of function. [] Progressing: [x] Met: [] Not Met: [] Adjusted  2. Patient will demonstrate increased AROM to L hip IR to 20 degrees to allow for proper joint functioning as indicated by patients Functional Deficits. [] Progressing: [x] Met: [] Not Met: [] Adjusted  3. Patient will demonstrate an increase in Strength to good proximal hip strength and control, within 5lb HHD in LE to allow for proper functional mobility as indicated by patients Functional Deficits. [x] Progressing: [] Met: [] Not Met: [] Adjusted  4. Patient will return to sitting work and driving activities without increased symptoms or restriction. [x] Progressing: [] Met: [] Not Met: [] Adjusted  5. Patient will report decreased pain with standing and walking dogs. (patient specific functional goal)    [] Progressing: [x] Met: [] Not Met: [] Adjusted     ASSESSMENT:  Patient with less soft tissue restriction in adductors and along medial knee and minimal restriction in hip IR. Good improvement in all hip mobility and soft tissue mobility.   Good tolerance to all strengthening. Fatigued at conclusion in quads. Will likely only need 1-2 additional visits- will reassess next session. Return to Play: (if applicable)   []  Stage 1: Intro to Strength   []  Stage 2: Return to Run and Strength   []  Stage 3: Return to Jump and Strength   []  Stage 4: Dynamic Strength and Agility   []  Stage 5: Sport Specific Training     []  Ready to Return to Play, Meets All Above Stages   []  Not Ready for Return to Sports   Comments:            Treatment/Activity Tolerance:  [x] Patient tolerated treatment well [] Patient limited by fatique  [] Patient limited by pain  [] Patient limited by other medical complications  [] Other:     Overall Progression Towards Functional goals/ Treatment Progress Update:  [x] Patient is progressing as expected towards functional goals listed. [] Progression is slowed due to complexities/Impairments listed. [] Progression has been slowed due to co-morbidities. [] Plan just implemented, too soon to assess goals progression <30days   [] Goals require adjustment due to lack of progress  [] Patient is not progressing as expected and requires additional follow up with physician  [] Other    Prognosis for POC: [x] Good [] Fair  [] Poor    Patient requires continued skilled intervention: [x] Yes  [] No        PLAN:   [x] Continue per plan of care [] Alter current plan (see comments)   []Plan of care initiated [] Hold pending MD visit [] Discharge    Electronically signed by: Chikis Hebert PT    Note: If patient does not return for scheduled/recommended follow up visits, this note will serve as a discharge from care along with the most recent update on progress.

## 2023-02-28 ENCOUNTER — HOSPITAL ENCOUNTER (OUTPATIENT)
Dept: PHYSICAL THERAPY | Age: 68
Setting detail: THERAPIES SERIES
Discharge: HOME | End: 2023-02-28
Payer: MEDICARE

## 2023-02-28 PROCEDURE — 97110 THERAPEUTIC EXERCISES: CPT

## 2023-02-28 PROCEDURE — 97140 MANUAL THERAPY 1/> REGIONS: CPT

## 2023-02-28 NOTE — FLOWSHEET NOTE
[place] [address] [address]  Phone: [phone] Fax: [phone]        Physical Therapy Treatment Note/ Progress Report:       Date:  2023    Patient Name:  Fernando Light    :  1955  MRN: [id number]  Restrictions/Precautions:    Medical/Treatment Diagnosis Information:  Diagnosis: L knee pain, L knee MMT  Treatment Diagnosis: L knee MMT S83.242A, L knee pain A68.999  Insurance/Certification information:  PT Insurance Information: Medicare/AARP  Physician Information:  Mary Jovel, [address] [facility] signed (Y/N):     Date of Patient follow up with Physician:      Progress Report: [x]  Yes  []  No     Date Range for reporting period:  Beginnin/3/2023  Ending: -    Progress report due (10 Rx/or 30 days whichever is less):     Recertification due (POC duration/ or 90 days whichever is less): 2/3/2023    Visit # Insurance Allowable Auth Needed   15 Medicare/AARP []Yes    []No     Latex Allergy:  [x]NO      []YES  Preferred Language for Healthcare:   [x]English       []other:  Functional Scale: Foto 70 , LEFS 62 Date assessed:1/3/2023    Pain level:  0/10     SUBJECTIVE:  Patient reports that his knee continues to feel better. Hasn't been having as much issues except on days when weather is not good. Overall though feels like he continues to improve. Feels like he is getting closer to being done with therapy.       OBJECTIVE:   Observation:   Test measurements:    ROM LEFT RIGHT   HIP Flex 125    HIP Abd      HIP Ext      HIP IR 20    HIP ER 55    Knee ext 0    Knee Flex 140    Strength  LEFT RIGHT   HIP Flexors 27.6 28.2   HIP Abductors 25.3 29.4   HIP Ext 28.4 28.5   Hip ER       Knee EXT (quad) 26.7 32.0   Knee Flex (HS) 27.9 28.9       RESTRICTIONS/PRECAUTIONS: none    Exercises/Interventions:     Therapeutic Ex (28103)   Min: 26 Sets/sec Reps Notes/CUES   Retro Stepper/BIKE      Alter G      BFR   Sportcord March      SLR 0     SAQ 0  Full roll, 3lb   Knee extension cybex- multi angle isometrics 5sec 10/ea No soreness reported with any angles today   H/s curl cybex 1 15 20 lb, 2 to 1 eccentric   BOSU fwdside lunge 10 15 L   SC march 4 15 Yellow SC, SBA    Wall sit- cues heel 5sec 10 80-90 degrees   Leg Press Iso/Con/Ecc 0-60 5sec 15 Bilat, 2 to 1 eccentrics, 70 lb   Hip Abd with eccentric control      Hip Add with eccentric control      Glute side walks 2 15 green band ankles   Slide Lunge 0                                               Manual Intervention (30102)  Min: 14      Knee mobs/PROM      Hip IR stretching and log roll for hip IR  0    Patella Mobs  3    SL quad stretch and hip flexor stretch 0  L   STM adductors, medial joint line, medial hamstring, patellar tendon  5    L hip belt mobs  6    NMR re-education (33655)  Min:   CUES NEEDED   Botswanan/Biofeedback 10/10      BFR      G. Med activation      Hip Ext full ROM/ G. Activation      Bosu Bal and Prop- G Med      Single leg stance/Balance/Prop      Bosu Retro G. Med act      Prone Hip froggers- sliders/elevated            Therapeutic Activity (48783)  Min:      Ladders      Plyos      Dynamic Balance                            Therapeutic Exercise and NMR EXR  [x] (74071) Provided verbal/tactile cueing for activities related to strengthening, flexibility, endurance, ROM for improvements in LE, proximal hip, and core control with self care, mobility, lifting, ambulation.  [x] (07998) Provided verbal/tactile cueing for activities related to improving balance, coordination, kinesthetic sense, posture, motor skill, proprioception  to assist with LE, proximal hip, and core control in self care, mobility, lifting, ambulation and eccentric single leg control.     NMR and Therapeutic Activities:    [x] (16984 or 77031) Provided verbal/tactile cueing for activities related to improving balance, coordination, kinesthetic sense, posture, motor skill,  proprioception and motor activation to allow for proper function of core, proximal hip and LE with self care and ADLs and functional mobility.   [] (03651) Gait Re-education- Provided training and instruction to the patient for proper LE, core and proximal hip recruitment and positioning and eccentric body weight control with ambulation re-education including up and down stairs     Home Exercise Program:    [x] (48346) Reviewed/Progressed HEP activities related to strengthening, flexibility, endurance, ROM of core, proximal hip and LE for functional self-care, mobility, lifting and ambulation/stair navigation   [] (17315)Reviewed/Progressed HEP activities related to improving balance, coordination, kinesthetic sense, posture, motor skill, proprioception of core, proximal hip and LE for self care, mobility, lifting, and ambulation/stair navigation      Manual Treatments:  PROM / STM / Oscillations-Mobs:  G-I, II, III, IV (PA's, Inf., Post.)  [x] (29865) Provided manual therapy to mobilize LE, proximal hip and/or LS spine soft tissue/joints for the purpose of modulating pain, promoting relaxation,  increasing ROM, reducing/eliminating soft tissue swelling/inflammation/restriction, improving soft tissue extensibility and allowing for proper ROM for normal function with self care, mobility, lifting and ambulation. Modalities:     [] GAME READY (VASO)- for significant edema, swelling, pain control.      Charges:  Timed Code Treatment Minutes: 40   Total Treatment Minutes: 42      [] EVAL (LOW) 55533 (typically 20 minutes face-to-face)  [] EVAL (MOD) 62468 (typically 30 minutes face-to-face)  [] EVAL (HIGH) 39119 (typically 45 minutes face-to-face)  [] RE-EVAL     [x] HM(54477) x  2   [] DRY NEEDLE 1 OR 2 MUSCLES  [] NMR (83665) x     [] DRY NEEDLE 3+ MUSCLES  [x] Manual (36386) x   1    [] TA (88928) x     [] Mech Traction (51672)  [] ES(attended) (92809)     [] ES (un) (24814):   [] VASO (27790)  [] Other:    If [facility] Please Indicate Time In/Out  CPT Code Time in Time out                                   GOALS:  Patient stated goal: painfree during sitting and driving  [x] Progressing: [] Met: [] Not Met: [] Adjusted    Therapist goals for Patient:   Short Term Goals: To be achieved in: 2 weeks  1. Independent in HEP and progression per patient tolerance, in order to prevent re-injury. [] Progressing: [x] Met: [] Not Met: [] Adjusted  2. Patient will have a decrease in pain to facilitate improvement in movement, function, and ADLs as indicated by Functional Deficits. [x] Progressing: [] Met: [] Not Met: [] Adjusted    Long Term Goals: To be achieved in: 8 weeks  1. Disability index score of 68 or better for Foto and 50/80 or better for the LEFS to assist with reaching prior level of function. [] Progressing: [x] Met: [] Not Met: [] Adjusted  2. Patient will demonstrate increased AROM to L hip IR to 20 degrees to allow for proper joint functioning as indicated by patients Functional Deficits. [] Progressing: [x] Met: [] Not Met: [] Adjusted  3. Patient will demonstrate an increase in Strength to good proximal hip strength and control, within 5lb HHD in LE to allow for proper functional mobility as indicated by patients Functional Deficits. [x] Progressing: [] Met: [] Not Met: [] Adjusted  4. Patient will return to sitting work and driving activities without increased symptoms or restriction. [x] Progressing: [] Met: [] Not Met: [] Adjusted  5. Patient will report decreased pain with standing and walking dogs. (patient specific functional goal)    [] Progressing: [x] Met: [] Not Met: [] Adjusted     ASSESSMENT:  Patient with less overall tenderness around knee today; but with increased adductor restriction. Hip IR with only minimal restriction today and good improvement with belt mobs. Tolerated all strengthening and having less overall pain and soreness with multi angle quad isometrics.  Will plan to reassess next session, may be ready for d/c at that time. Return to Play: (if applicable)   []  Stage 1: Intro to Strength   []  Stage 2: Return to Run and Strength   []  Stage 3: Return to Jump and Strength   []  Stage 4: Dynamic Strength and Agility   []  Stage 5: Sport Specific Training     []  Ready to Return to Play, Meets All Above Stages   []  Not Ready for Return to Sports   Comments:            Treatment/Activity Tolerance:  [x] Patient tolerated treatment well [] Patient limited by fatique  [] Patient limited by pain  [] Patient limited by other medical complications  [] Other:     Overall Progression Towards Functional goals/ Treatment Progress Update:  [x] Patient is progressing as expected towards functional goals listed. [] Progression is slowed due to complexities/Impairments listed. [] Progression has been slowed due to co-morbidities. [] Plan just implemented, too soon to assess goals progression <30days   [] Goals require adjustment due to lack of progress  [] Patient is not progressing as expected and requires additional follow up with physician  [] Other    Prognosis for POC: [x] Good [] Fair  [] Poor    Patient requires continued skilled intervention: [x] Yes  [] No        PLAN:   [x] Continue per plan of care [] Alter current plan (see comments)   []Plan of care initiated [] Hold pending MD visit [] Discharge    Electronically signed by: Yanci Alves PT    Note: If patient does not return for scheduled/recommended follow up visits, this note will serve as a discharge from care along with the most recent update on progress.

## 2023-03-01 ENCOUNTER — HOSPITAL ENCOUNTER (OUTPATIENT)
Dept: PHYSICAL THERAPY | Age: 68
Setting detail: THERAPIES SERIES
End: 2023-03-01
Payer: MEDICARE

## 2023-03-14 ENCOUNTER — HOSPITAL ENCOUNTER (OUTPATIENT)
Dept: PHYSICAL THERAPY | Age: 68
Setting detail: THERAPIES SERIES
Discharge: HOME | End: 2023-03-14
Payer: MEDICARE

## 2023-03-14 PROCEDURE — 97140 MANUAL THERAPY 1/> REGIONS: CPT

## 2023-03-14 PROCEDURE — 97110 THERAPEUTIC EXERCISES: CPT

## 2023-03-14 NOTE — PLAN OF CARE
[address] [address] [address]  Phone: [phone] Fax: [phone]     Physical Therapy Discharge Summary    Dear Dr Emily Ontiveros ,    We had the pleasure of treating the following patient for physical therapy services at [address] [place] [address]. A summary of our findings can be found in the discharge summary below. If you have any questions or concerns regarding these findings, please do not hesitate to contact me at the office phone number above.   Thank you for the referral.     Physician Signature:________________________________Date:__________________  By signing above (or electronic signature), therapists plan is approved by physician      Overall Response to Treatment:   []Patient is responding well to treatment and improvement is noted with regards  to goals   [x]Patient should continue to improve in reasonable time if they continue HEP   []Patient has plateaued and is no longer responding to skilled PT intervention    []Patient is getting worse and would benefit from return to referring MD   []Patient unable to adhere to initial POC   []Other:     Date range of Visits: 10/27/2022 thru 3/14/2023  Total Visits: 16          Physical Therapy Treatment Note/ Progress Report:       Date:  2023    Patient Name:  Paolo Brooks    :  1955  MRN: [id number]  Restrictions/Precautions:    Medical/Treatment Diagnosis Information:  Diagnosis: L knee pain, L knee MMT  Treatment Diagnosis: L knee MMT S83.242A, L knee pain J17.271  Insurance/Certification information:  PT Insurance Information: Medicare/AARP  Physician Information:  Emily Ontiveros, [address] [facility] signed (Y/N):     Date of Patient follow up with Physician:      Progress Report: [x]  Yes  []  No     Date Range for reporting period:  Beginnin/3/2023  Ending: 3/14/2023    Progress report due (10 Rx/or 30 days whichever is less): 1/2/8398    Recertification due (POC duration/ or 90 days whichever is less): 2/3/2023    Visit # Insurance Allowable Auth Needed   16 Medicare/AARP []Yes    []No     Latex Allergy:  [x]NO      []YES  Preferred Language for Healthcare:   [x]English       []other:  Functional Scale: LEFS 65 Date assessed:3/14/2023    Pain level:  0/10     SUBJECTIVE:  Patient reports that his knee continues to feel better. Is doing well with walking the dogs and driving. Feels ready to d/c from therapy.      OBJECTIVE:   Observation:   Test measurements:    ROM LEFT RIGHT   HIP Flex 125 110   HIP Abd      HIP Ext      HIP IR 20 18   HIP ER 55 50   Knee ext 0    Knee Flex 130 130   Strength  LEFT RIGHT   HIP Flexors 28.4 27.8   HIP Abductors 35.6 32.1   HIP Ext 32.4 31.6   Hip ER       Knee EXT (quad) 48.2 49.1   Knee Flex (HS) 36.9 35.9       RESTRICTIONS/PRECAUTIONS: none    Exercises/Interventions:     Therapeutic Ex (49965)   Min: 26 Sets/sec Reps Notes/CUES   Retro Stepper/BIKE      Alter G      BFR      Sportcord March      SLR 0     SAQ 0  Full roll, 3lb   Knee extension cybex- multi angle isometrics 5sec 10/ea No soreness reported with any angles today   H/s curl cybex 1 15 20 lb, 2 to 1 eccentric   BOSU fwdside lunge 10 15 L   SC march 4 15 Yellow SC, SBA    Wall sit- cues heel 5sec 10 80-90 degrees   Leg Press Iso/Con/Ecc 0-60 5sec 15 Bilat, 2 to 1 eccentrics, 70 lb   Hip Abd with eccentric control      Hip Add with eccentric control      Glute side walks 2 15 green band ankles   Slide Lunge 0                                               Manual Intervention (90073)  Min: 14      Knee mobs/PROM      Hip IR stretching and log roll for hip IR  0    Patella Mobs  3    SL quad stretch and hip flexor stretch 0  L   STM adductors, medial joint line, medial hamstring, patellar tendon  5    L hip belt mobs  6    NMR re-education (05505)  Min:   CUES NEEDED   Faroese/Biofeedback 10/10      BFR      G. Med activation      Hip Ext full ROM/ G. Activation      Bosu Bal and Prop- G Med      Single leg stance/Balance/Prop      Bosu Retro G. Med act      Prone Hip froggers- sliders/elevated            Therapeutic Activity (07738)  Min:      Ladders      Plyos      Dynamic Balance                            Therapeutic Exercise and NMR EXR  [x] (32158) Provided verbal/tactile cueing for activities related to strengthening, flexibility, endurance, ROM for improvements in LE, proximal hip, and core control with self care, mobility, lifting, ambulation. [x] (27512) Provided verbal/tactile cueing for activities related to improving balance, coordination, kinesthetic sense, posture, motor skill, proprioception  to assist with LE, proximal hip, and core control in self care, mobility, lifting, ambulation and eccentric single leg control.      NMR and Therapeutic Activities:    [x] (45122 or 35923) Provided verbal/tactile cueing for activities related to improving balance, coordination, kinesthetic sense, posture, motor skill, proprioception and motor activation to allow for proper function of core, proximal hip and LE with self care and ADLs and functional mobility.   [] (06338) Gait Re-education- Provided training and instruction to the patient for proper LE, core and proximal hip recruitment and positioning and eccentric body weight control with ambulation re-education including up and down stairs     Home Exercise Program:    [x] (52395) Reviewed/Progressed HEP activities related to strengthening, flexibility, endurance, ROM of core, proximal hip and LE for functional self-care, mobility, lifting and ambulation/stair navigation   [] (76839)Reviewed/Progressed HEP activities related to improving balance, coordination, kinesthetic sense, posture, motor skill, proprioception of core, proximal hip and LE for self care, mobility, lifting, and ambulation/stair navigation      Manual Treatments:  PROM / STM / Oscillations-Mobs:  G-I, II, III, IV (PA's, Inf., Post.)  [x] (15130) Provided manual therapy to mobilize LE, proximal hip and/or LS spine soft tissue/joints for the purpose of modulating pain, promoting relaxation,  increasing ROM, reducing/eliminating soft tissue swelling/inflammation/restriction, improving soft tissue extensibility and allowing for proper ROM for normal function with self care, mobility, lifting and ambulation. Modalities:     [] GAME READY (VASO)- for significant edema, swelling, pain control. Charges:  Timed Code Treatment Minutes: 40   Total Treatment Minutes: 42      [] EVAL (LOW) 13621 (typically 20 minutes face-to-face)  [] EVAL (MOD) 71012 (typically 30 minutes face-to-face)  [] EVAL (HIGH) 82986 (typically 45 minutes face-to-face)  [] RE-EVAL     [x] AW(01931) x  2   [] DRY NEEDLE 1 OR 2 MUSCLES  [] NMR (81821) x     [] DRY NEEDLE 3+ MUSCLES  [x] Manual (76805) x   1    [] TA (86341) x     [] Mech Traction (22664)  [] ES(attended) (26392)     [] ES (un) (47157):   [] VASO (98307)  [] Other:    If [facility] Please Indicate Time In/Out  CPT Code Time in Time out                                   GOALS:  Patient stated goal: painfree during sitting and driving  [] Progressing: [x] Met: [] Not Met: [] Adjusted    Therapist goals for Patient:   Short Term Goals: To be achieved in: 2 weeks  1. Independent in HEP and progression per patient tolerance, in order to prevent re-injury. [] Progressing: [x] Met: [] Not Met: [] Adjusted  2. Patient will have a decrease in pain to facilitate improvement in movement, function, and ADLs as indicated by Functional Deficits. [] Progressing: [x] Met: [] Not Met: [] Adjusted    Long Term Goals: To be achieved in: 8 weeks  1. Disability index score of 68 or better for Foto and 50/80 or better for the LEFS to assist with reaching prior level of function. [] Progressing: [x] Met: [] Not Met: [] Adjusted  2.  Patient will demonstrate increased AROM to L hip IR to 20 degrees to allow for proper joint functioning as indicated by patients Functional Deficits. [] Progressing: [x] Met: [] Not Met: [] Adjusted  3. Patient will demonstrate an increase in Strength to good proximal hip strength and control, within 5lb HHD in LE to allow for proper functional mobility as indicated by patients Functional Deficits. [] Progressing: [x] Met: [] Not Met: [] Adjusted  4. Patient will return to sitting work and driving activities without increased symptoms or restriction. [] Progressing: [x] Met: [] Not Met: [] Adjusted  5. Patient will report decreased pain with standing and walking dogs. (patient specific functional goal)    [] Progressing: [x] Met: [] Not Met: [] Adjusted     ASSESSMENT:  Patient has been seen for 16 visits to address L knee pain. Patient has made good progress in regards to ROM and strength. Has met all goals at this time and is IND with home program. Patient ready for d/c from skilled PT services and educated to continue with program.        Return to Play: (if applicable)   []  Stage 1: Intro to Strength   []  Stage 2: Return to Run and Strength   []  Stage 3: Return to Jump and Strength   []  Stage 4: Dynamic Strength and Agility   []  Stage 5: Sport Specific Training     []  Ready to Return to Play, Meets All Above Stages   []  Not Ready for Return to Sports   Comments:            Treatment/Activity Tolerance:  [x] Patient tolerated treatment well [] Patient limited by fatique  [] Patient limited by pain  [] Patient limited by other medical complications  [] Other:     Overall Progression Towards Functional goals/ Treatment Progress Update:  [x] Patient is progressing as expected towards functional goals listed. [] Progression is slowed due to complexities/Impairments listed. [] Progression has been slowed due to co-morbidities.   [] Plan just implemented, too soon to assess goals progression <30days   [] Goals require adjustment due to lack of progress  [] Patient is not progressing as expected and requires additional follow up with physician  [] Other    Prognosis for POC: [x] Good [] Fair  [] Poor    Patient requires continued skilled intervention: [x] Yes  [] No        PLAN:   [] Continue per plan of care [] Alter current plan (see comments)   []Plan of care initiated [] Hold pending MD visit [x] Discharge    Electronically signed by: Noemi Daley PT    Note: If patient does not return for scheduled/recommended follow up visits, this note will serve as a discharge from care along with the most recent update on progress.

## 2024-01-10 ENCOUNTER — HOSPITAL ENCOUNTER (OUTPATIENT)
Dept: PHYSICAL THERAPY | Age: 69
Setting detail: THERAPIES SERIES
Discharge: HOME | End: 2024-01-10
Payer: MEDICARE

## 2024-01-10 PROCEDURE — 97161 PT EVAL LOW COMPLEX 20 MIN: CPT

## 2024-01-10 PROCEDURE — 97140 MANUAL THERAPY 1/> REGIONS: CPT

## 2024-01-10 NOTE — FLOWSHEET NOTE
[facility] - Outpatient Rehabilitation and Therapy [address] [place] [address] office: [phone] fax: [phone]      Physical Therapy: TREATMENT/PROGRESS NOTE   Patient: Shakeel Salgado (68 y.o. male)   Treatment Date: 01/10/2024   :  1955 MRN: [id number]   Visit #: 1   Insurance Allowable Auth Needed   Medicare[place] []Yes    [x]No    Insurance: Payor: MEDICARE / Plan: MEDICARE PART A AND B / Product Type: *No Product type* /   Insurance ID: [id number] - (Medicare)  Secondary Insurance (if applicable): [facility]   Treatment Diagnosis: : low back pain M54.5, DDD facet arthropathy M46.96   No diagnosis found.   Medical Diagnosis:    Acute bilateral low back pain [M54.50]   Referring Physician: Edi Quintero, *  PCP: Mingo Jimenez MD                             Plan of care signed (Y/N):     Date of Patient follow up with Physician:      Progress Report/POC: EVAL today  POC update due: (10 visits /OR AUTH LIMITS, whichever is less)  2024       Preferred Language for Healthcare:   [x]English       []other:    SUBJECTIVE EXAMINATION     Patient Report/Comments: see eval     Test used Initial score  1/10/24 01/10/2024   Pain Summary VAS 4    Functional questionnaire Modified Oswestry 26%    Other:                OBJECTIVE EXAMINATION     Observation:     Test measurements: see eval    Exercises/Interventions:     Therapeutic Ex (33369)  resistance Sets/time Reps Notes/Cues/Progressions   Hooklying  from 90/90 + TA activation                                                                      Manual Intervention (89083) 20  TIME     B Hip mobs  10     STM R lumbar paraspinals  5     Gentle l/spine mobs  5                   NMR re-education (33089) resistance Sets/time Reps CUES NEEDED                                      Therapeutic Activity (83047)  Sets/time                                          Modalities:    No modalities applied

## 2024-01-10 NOTE — PLAN OF CARE
instructions outlined on the flowsheet on 01/10/2024.    Electronically Signed by Janene Schmidt PT  Date: 01/10/2024

## 2024-01-17 ENCOUNTER — HOSPITAL ENCOUNTER (OUTPATIENT)
Dept: PHYSICAL THERAPY | Age: 69
Setting detail: THERAPIES SERIES
Discharge: HOME | End: 2024-01-17
Payer: MEDICARE

## 2024-01-17 PROCEDURE — 97110 THERAPEUTIC EXERCISES: CPT

## 2024-01-17 PROCEDURE — 97140 MANUAL THERAPY 1/> REGIONS: CPT

## 2024-01-17 NOTE — FLOWSHEET NOTE
mobs- left hip  5     STM R lumbar paraspinals  7     Gentle l/spine mobs  5     Prone L hip ER mobs  4            NMR re-education (99746) resistance Sets/time Reps CUES NEEDED                                      Therapeutic Activity (77306)  Sets/time                                          Modalities:    No modalities applied this session    Education/Home Exercise Program: HEP discussed and performed, see exercise grid      ASSESSMENT     Today's Assessment: During therapy this date, patient required verbal cueing, tactile cueing, progression of exercises and program, and manual interventions for exercise progression, improving proper muscle recruitment and activation/motor control patterns, increasing ROM, and improving soft tissue extensibility.Patient will continue to benefit from ongoing evaluation and advanced clinical decision from a Physical Therapist to address and improve pain control, ROM, muscle strength, and balance and proprioception to safely return to PLOF without symptoms or restrictions. Tolerated new exercises well.     Medical Necessity Documentation:  I certify that this patient meets the below criteria necessary for medical necessity for care and/or justification of therapy services:  The patient has functional impairments and/or activity limitations and would benefit from continued outpatient therapy services to address the deficits outlined in the patients goals  The patient has a musculoskeletal condition(s) with a corresponding ICD-10 code that is of complexity and severity that require skilled therapeutic intervention. This has a direct and significant impact on the need for therapy and significantly impacts the rate of recovery.   The patient has a complexity identified by an ICD-10 code that has a direct and significant impact on the need for therapy.  (Significantly impacts the rate of recovery and is associated with a primary condition.)     Treatment/Activity Tolerance:  [x]

## 2024-01-22 ENCOUNTER — HOSPITAL ENCOUNTER (OUTPATIENT)
Dept: PHYSICAL THERAPY | Age: 69
Setting detail: THERAPIES SERIES
Discharge: HOME | End: 2024-01-22
Payer: MEDICARE

## 2024-01-22 PROCEDURE — 97140 MANUAL THERAPY 1/> REGIONS: CPT

## 2024-01-22 PROCEDURE — 97110 THERAPEUTIC EXERCISES: CPT

## 2024-01-22 NOTE — FLOWSHEET NOTE
[facility] - Outpatient Rehabilitation and Therapy [address] [place] [address] office: [phone] fax: [phone]      Physical Therapy: TREATMENT/PROGRESS NOTE   Patient: Shakeel Salgado (68 y.o. male)   Treatment Date: 2024   :  1955 MRN: [id number]   Visit #: 3   Insurance Allowable Auth Needed   [place] []Yes    [x]No    Insurance: Payor: MEDICARE / Plan: MEDICARE PART A AND B / Product Type: *No Product type* /   Insurance ID: [id number] - (Medicare)  Secondary Insurance (if applicable): [facility]   Treatment Diagnosis: : low back pain M54.5, DDD facet arthropathy M46.96   No diagnosis found.   Medical Diagnosis:    Acute bilateral low back pain [M54.50]   Referring Physician: Edi Quintero, *  PCP: Mingo Jimenez MD                             Plan of care signed (Y/N):     Date of Patient follow up with Physician:      Progress Report/POC: NO  POC update due: (10 visits /OR AUTH LIMITS, whichever is less)  2024       Preferred Language for Healthcare:   [x]English       []other:    SUBJECTIVE EXAMINATION     Patient Report/Comments: Pt reports that he had a HA after last session. Not sure why. Feeling a bit sore across entire low back today. Had to squeeze between 2 dressers and bruised back some.  Overall feeling stiff and sore today.      Test used Initial score  1/10/24 2024   Pain Summary VAS 4    Functional questionnaire Modified Oswestry 26%    Other:                OBJECTIVE EXAMINATION     Observation:     Test measurements: see eval    Exercises/Interventions:     Therapeutic Ex (20761) 23 resistance Sets/time Reps Notes/Cues/Progressions   Hooklying alternating march from 90/90 + TA activation  1 10/ea    Prone glute activation  10 10    Bridge  1 15    SL rotation stretch  10 10/ea    POT hip extension bilat 1 10/ea    Multifidus chop green 1 10                                Manual Intervention (20384) 21  TIME

## 2024-01-31 ENCOUNTER — HOSPITAL ENCOUNTER (OUTPATIENT)
Dept: PHYSICAL THERAPY | Age: 69
Setting detail: THERAPIES SERIES
Discharge: HOME | End: 2024-01-31
Payer: MEDICARE

## 2024-01-31 PROCEDURE — 97140 MANUAL THERAPY 1/> REGIONS: CPT

## 2024-01-31 PROCEDURE — 97110 THERAPEUTIC EXERCISES: CPT

## 2024-01-31 NOTE — FLOWSHEET NOTE
[facility] - Outpatient Rehabilitation and Therapy [address] [place] [address] office: [phone] fax: [phone]      Physical Therapy: TREATMENT/PROGRESS NOTE   Patient: Shakeel Salgado (68 y.o. male)   Treatment Date: 2024   :  1955 MRN: [id number]   Visit #: 4   Insurance Allowable Auth Needed   [place] []Yes    [x]No    Insurance: Payor: MEDICARE / Plan: MEDICARE PART A AND B / Product Type: *No Product type* /   Insurance ID: [id number] - (Medicare)  Secondary Insurance (if applicable): [facility]   Treatment Diagnosis: : low back pain M54.5, DDD facet arthropathy M46.96   No diagnosis found.   Medical Diagnosis:    Acute bilateral low back pain [M54.50]   Referring Physician: Edi Quintero, *  PCP: Mingo Jimenez MD                             Plan of care signed (Y/N):     Date of Patient follow up with Physician:      Progress Report/POC: NO  POC update due: (10 visits /OR AUTH LIMITS, whichever is less)  3/1/2024       Preferred Language for Healthcare:   [x]English       []other:    SUBJECTIVE EXAMINATION     Patient Report/Comments: Pt reports that he felt good for a little bit after last session. Notes that it didn't last. Had to take a tramadol the other day. Yesterday was a better day and was able to drive 50 min each way without issue.       Test used Initial score  1/10/24 2024   Pain Summary VAS 4    Functional questionnaire Modified Oswestry 26%    Other:                OBJECTIVE EXAMINATION     Observation:     Test measurements: see eval    Exercises/Interventions:     Therapeutic Ex (28618) 23 resistance Sets/time Reps Notes/Cues/Progressions   Hooklying alternating march from 90/90 + TA activation  1 10/ea    Prone glute activation  10 10    Bridge  1 15    SL rotation stretch  10 10/ea    POT hip extension bilat 1 10/ea    Multifidus chop green 1 10                                Manual Intervention (95417) 21  TIME

## 2024-02-08 ENCOUNTER — HOSPITAL ENCOUNTER (OUTPATIENT)
Dept: PHYSICAL THERAPY | Age: 69
Setting detail: THERAPIES SERIES
Discharge: HOME | End: 2024-02-08
Payer: MEDICARE

## 2024-02-08 PROCEDURE — 97110 THERAPEUTIC EXERCISES: CPT

## 2024-02-08 PROCEDURE — 97140 MANUAL THERAPY 1/> REGIONS: CPT

## 2024-02-08 NOTE — FLOWSHEET NOTE
[facility] - Outpatient Rehabilitation and Therapy [address] [place] [address] office: [phone] fax: [phone]      Physical Therapy: TREATMENT/PROGRESS NOTE   Patient: Shakeel Salgado (68 y.o. male)   Treatment Date: 2024   :  1955 MRN: [id number]   Visit #: 5   Insurance Allowable Auth Needed   [place] []Yes    [x]No    Insurance: Payor: MEDICARE / Plan: MEDICARE PART A AND B / Product Type: *No Product type* /   Insurance ID: [id number] - (Medicare)  Secondary Insurance (if applicable): [facility]   Treatment Diagnosis: : low back pain M54.5, DDD facet arthropathy M46.96   No diagnosis found.   Medical Diagnosis:    Acute bilateral low back pain [M54.50]   Referring Physician: Edi Quintero, *  PCP: Mingo Jimenez MD                             Plan of care signed (Y/N):     Date of Patient follow up with Physician:      Progress Report/POC: NO  POC update due: (10 visits /OR AUTH LIMITS, whichever is less)  3/8/2024       Preferred Language for Healthcare:   [x]English       []other:    SUBJECTIVE EXAMINATION     Patient Report/Comments: Pt reports that he felt good for a few hours after last session.   Knee has been bothering him some. Took a longer walk without brace on knee which was aggravating.      Test used Initial score  1/10/24 2024   Pain Summary VAS 4    Functional questionnaire Modified Oswestry 26%    Other:                OBJECTIVE EXAMINATION     Observation:     Test measurements: see eval    Exercises/Interventions:     Therapeutic Ex (75556) 23 resistance Sets/time Reps Notes/Cues/Progressions   Hooklying alternating march from 90/90 + TA activation  1 10/ea       Bridge  1 15    SL rotation stretch- bilat  10 10/ea    POT hip extension bilat 1 10/ea       DKTC stretch  30 4    SKTC stretch bilat 30 3/ea    TA + alternating SLR  2 10           Manual Intervention (93143) 21  TIME     Belt mobs- left hip  5     STM R

## 2024-02-14 ENCOUNTER — HOSPITAL ENCOUNTER (OUTPATIENT)
Dept: PHYSICAL THERAPY | Age: 69
Setting detail: THERAPIES SERIES
Discharge: HOME | End: 2024-02-14
Payer: MEDICARE

## 2024-02-14 PROCEDURE — 97110 THERAPEUTIC EXERCISES: CPT

## 2024-02-14 PROCEDURE — 97140 MANUAL THERAPY 1/> REGIONS: CPT

## 2024-02-14 NOTE — FLOWSHEET NOTE
[facility] - Outpatient Rehabilitation and Therapy [address] [place] [address] office: [phone] fax: [phone]      Physical Therapy: TREATMENT/PROGRESS NOTE   Patient: Shakeel Salgado (68 y.o. male)   Treatment Date: 2024   :  1955 MRN: [id number]   Visit #: 6   Insurance Allowable Auth Needed   [place] []Yes    [x]No    Insurance: Payor: MEDICARE / Plan: MEDICARE PART A AND B / Product Type: *No Product type* /   Insurance ID: [id number] - (Medicare)  Secondary Insurance (if applicable): [facility]   Treatment Diagnosis: : low back pain M54.5, DDD facet arthropathy M46.96   No diagnosis found.   Medical Diagnosis:    Acute bilateral low back pain [M54.50]   Referring Physician: Edi Quintero, *  PCP: iMngo Jimenez MD                             Plan of care signed (Y/N):     Date of Patient follow up with Physician:      Progress Report/POC: NO  POC update due: (10 visits /OR AUTH LIMITS, whichever is less)  3/15/2024       Preferred Language for Healthcare:   [x]English       []other:    SUBJECTIVE EXAMINATION     Patient Report/Comments: Pt reports that he had a massage the other day and has been hurting since then. Back has been more sore and had a migraine after. He is still having discomfort with standing and walking. .      Test used Initial score  1/10/24 2024   Pain Summary VAS 4    Functional questionnaire Modified Oswestry 26%    Other:                OBJECTIVE EXAMINATION     Observation:     Test measurements: see eval    Exercises/Interventions:     Therapeutic Ex (19345) 23 resistance Sets/time Reps Notes/Cues/Progressions   Hooklying alternating march from 90/90 + TA activation  1 10/ea    Prone glute activation  10 10    Bridge  1 15    SL rotation stretch- bilat  10 10/ea    POT hip extension bilat 1 10/ea       DKTC stretch  30 4    SKTC stretch bilat 30 3/ea    TA + alternating SLR  2 10           Manual

## 2024-02-21 ENCOUNTER — HOSPITAL ENCOUNTER (OUTPATIENT)
Dept: PHYSICAL THERAPY | Age: 69
Setting detail: THERAPIES SERIES
Discharge: HOME | End: 2024-02-21
Payer: MEDICARE

## 2024-02-21 PROCEDURE — 97110 THERAPEUTIC EXERCISES: CPT

## 2024-02-21 PROCEDURE — 97140 MANUAL THERAPY 1/> REGIONS: CPT

## 2024-02-21 NOTE — FLOWSHEET NOTE
following either an injury or surgery.  (01394) NEUROMUSCULAR RE-EDUCATION - Therapeutic procedure, 1 or more areas, each 15 minutes; neuromuscular reeducation of movement, balance, coordination, kinesthetic sense, posture, and/or proprioception for sitting and/or standing activities  (91700) THERAPEUTIC ACTIVITY - use of dynamic activities to improve functional performance. (Ex include squatting, ascending/descending stairs, walking, bending, lifting, catching, throwing, pushing, pulling, jumping.)  Direct, one on one contact, billed in 15-minute increments.  (87883) MANUAL THERAPY -  Manual therapy techniques, 1 or more regions, each 15 minutes (Mobilization/manipulation, manual lymphatic drainage, manual traction) for the purpose of modulating pain, promoting relaxation,  increasing ROM, reducing/eliminating soft tissue swelling/inflammation/restriction, improving soft tissue extensibility and allowing for proper ROM for normal function with self care, mobility, lifting and ambulation    TREATMENT PLAN   Plan: Cont POC- Continue emphasis/focus on exercise progression and improving proper muscle recruitment and activation/motor control patterns. Next visit plan to add new exercises     Electronically Signed by Janene Schmidt PT              Date: 02/21/2024     Note: If patient does not return for scheduled/recommended follow up visits, this note will serve as a discharge from care along with the most recent update on progress.

## 2024-02-28 ENCOUNTER — HOSPITAL ENCOUNTER (OUTPATIENT)
Dept: PHYSICAL THERAPY | Age: 69
Setting detail: THERAPIES SERIES
Discharge: HOME | End: 2024-02-28
Payer: MEDICARE

## 2024-02-28 PROCEDURE — 97110 THERAPEUTIC EXERCISES: CPT

## 2024-02-28 PROCEDURE — 97140 MANUAL THERAPY 1/> REGIONS: CPT

## 2024-02-28 NOTE — FLOWSHEET NOTE
and significant impact on the need for therapy.  (Significantly impacts the rate of recovery and is associated with a primary condition.)     Treatment/Activity Tolerance:  [x] Patient tolerated treatment well [] Patient limited by fatique  [] Patient limited by pain  [] Patient limited by other medical complications  [] Other:     Return to Play: NA    Prognosis for POC: [x] Good [] Fair  [] Poor    Patient requires continued skilled intervention: [x] Yes  [] No      GOALS       Patient stated goal: ease pain, strengthen back  Status: [] Progressing: [] Met: [] Not Met: [] Adjusted    Therapist goals for Patient:   Short Term Goals: To be achieved in: 2 weeks  Independent in HEP and progression per patient tolerance, in order to progress toward full function and prevent re-injury.    Status: [] Progressing: [] Met: [] Not Met: [] Adjusted  Patient will have a decrease in pain to no greater than 1- 2/10 to help  facilitate improvement in movement, function, and ADLs as indicated by functional deficits.   Status: [] Progressing: [] Met: [] Not Met: [] Adjusted    Long Term Goals: To be achieved in: 8 weeks  Disability index score of 15% or less for the Modified Oswestry to assist with return top prior level of function.    Status: [] Progressing: [] Met: [] Not Met: [] Adjusted  Improve  lumbar AROM  Flexion to fingertips to ankle mortise degrees or  better to allow for proper joint functioning as indicated by patients functional deficits.  Status: [] Progressing: [] Met: [] Not Met: [] Adjusted  Pt to improve strength to show motor control/activation of proximal hip and TrA/abdominal to facilitate functional mobility and ADLs.   Status: [] Progressing: [] Met: [] Not Met: [] Adjusted  Patient will return to Usual work, housework or activities, lifting an object from the floor, light home activities, heavy home activities, and sit for length of time without increased symptoms or restriction to work towards return to

## 2024-03-06 ENCOUNTER — HOSPITAL ENCOUNTER (OUTPATIENT)
Dept: PHYSICAL THERAPY | Age: 69
Setting detail: THERAPIES SERIES
Discharge: HOME | End: 2024-03-06
Payer: MEDICARE

## 2024-03-06 PROCEDURE — 97110 THERAPEUTIC EXERCISES: CPT

## 2024-03-06 PROCEDURE — 97140 MANUAL THERAPY 1/> REGIONS: CPT

## 2024-03-06 NOTE — FLOWSHEET NOTE
[facility] - Outpatient Rehabilitation and Therapy [address] [place] [address] office: [phone] fax: [phone]      Physical Therapy: TREATMENT/PROGRESS NOTE   Patient: Shakeel Salgado (68 y.o. male)   Treatment Date: 2024   :  1955 MRN: [id number]   Visit #: 9   Insurance Allowable Auth Needed   [place] []Yes    [x]No    Insurance: Payor: MEDICARE / Plan: MEDICARE PART A AND B / Product Type: *No Product type* /   Insurance ID: [id number] - (Medicare)  Secondary Insurance (if applicable): [facility]   Treatment Diagnosis: : low back pain M54.5, DDD facet arthropathy M46.96   No diagnosis found.   Medical Diagnosis:    Acute bilateral low back pain [M54.50]   Referring Physician: Edi Quintero, *  PCP: Mingo Jimenez MD                             Plan of care signed (Y/N):     Date of Patient follow up with Physician:      Progress Report/POC: NO  POC update due: (10 visits /OR AUTH LIMITS, whichever is less)  2024       Preferred Language for Healthcare:   [x]English       []other:    SUBJECTIVE EXAMINATION     Patient Report/Comments: Pt reports that he felt good after last session. Lasted a little while, but not as long as he would of liked. States that he walked the dogs on Monday and he had more pain with walking than he normally does. Also, states that he went to store and found a cheap abdominal binder which he has been using and this has been somewhat helpful. Still waiting for insurance to approve injection/block.       Test used Initial score  1/10/24 2024   Pain Summary VAS 4    Functional questionnaire Modified Oswestry 26%    Other:                OBJECTIVE EXAMINATION     Observation:     Test measurements: see eval    Exercises/Interventions:     Therapeutic Ex (12664) 25 resistance Sets/time Reps Notes/Cues/Progressions   Hooklying alternating march from 90/90 + TA activation  1 10/ea    Prone glute + quad activation  You have an ear infection (acute otitis media).     Please take your antibiotics as ordered. Complete the full dose even if you are feeling better. You may take your antibiotics with food.     You may take a daily probiotic while on antibiotics.    Follow up if symptoms are worsening or if symptoms are not improving within 2 days of starting antibiotics.

## 2024-03-20 ENCOUNTER — HOSPITAL ENCOUNTER (OUTPATIENT)
Dept: PHYSICAL THERAPY | Age: 69
Setting detail: THERAPIES SERIES
Discharge: HOME | End: 2024-03-20
Payer: MEDICARE

## 2024-03-20 PROCEDURE — 97140 MANUAL THERAPY 1/> REGIONS: CPT

## 2024-03-20 PROCEDURE — 97110 THERAPEUTIC EXERCISES: CPT

## 2024-03-20 NOTE — FLOWSHEET NOTE
[facility] - Outpatient Rehabilitation and Therapy [address] [place] [address] office: [phone] fax: [phone]      Physical Therapy: TREATMENT/PROGRESS NOTE   Patient: Shakeel Salgado (68 y.o. male)   Treatment Date: 2024   :  1955 MRN: [id number]   Visit #: 10   Insurance Allowable Auth Needed   [place] []Yes    [x]No    Insurance: Payor: MEDICARE / Plan: MEDICARE PART A AND B / Product Type: *No Product type* /   Insurance ID: [id number] - (Medicare)  Secondary Insurance (if applicable): [facility]   Treatment Diagnosis: : low back pain M54.5, DDD facet arthropathy M46.96   No diagnosis found.   Medical Diagnosis:    Acute bilateral low back pain [M54.50]   Referring Physician: Edi Quintero, *  PCP: Mingo Jimenez MD                             Plan of care signed (Y/N):     Date of Patient follow up with Physician:      Progress Report/POC: NO  POC update due: (10 visits /OR AUTH LIMITS, whichever is less)  2024       Preferred Language for Healthcare:   [x]English       []other:    SUBJECTIVE EXAMINATION     Patient Report/Comments: Pt reports that he felt good after last session. States that he had to sleep on couch for the past day due to dog having frequent seizures. States that he is hurting more in his low back. Reports that he is going to be getting a block on April 3 and hoping to get his ablation shortly after.      Test used Initial score  1/10/24 2024   Pain Summary VAS 4    Functional questionnaire Modified Oswestry 26%    Other:                OBJECTIVE EXAMINATION     Observation:     Test measurements: see eval    Exercises/Interventions:     Therapeutic Ex (82090) 18 resistance Sets/time Reps Notes/Cues/Progressions   Hooklying alternating march from 90/90 + TA activation  1 10/ea    Prone glute + quad activation  10 10    Bridge  1 15    Prone bird dog  1 10    SL rotation stretch- bilat  10 10/ea          DKTC

## 2024-03-27 ENCOUNTER — HOSPITAL ENCOUNTER (OUTPATIENT)
Dept: PHYSICAL THERAPY | Age: 69
Setting detail: THERAPIES SERIES
Discharge: HOME | End: 2024-03-27
Payer: MEDICARE

## 2024-03-27 PROCEDURE — 97140 MANUAL THERAPY 1/> REGIONS: CPT

## 2024-03-27 PROCEDURE — 97110 THERAPEUTIC EXERCISES: CPT

## 2024-03-27 NOTE — PLAN OF CARE
PT 1x / wk for 6 weeks.               []Hold PT, pending MD visit   [] Discharge to [facility]. Follow up with PT or MD PRN.       Physical Therapy: TREATMENT/PROGRESS NOTE   Patient: Shakeel Salagdo (68 y.o. male)   Treatment Date: 2024   :  1955 MRN: [id number]   Visit #: 11   Insurance Allowable Auth Needed   [place] []Yes    [x]No    Insurance: Payor: MEDICARE / Plan: MEDICARE PART A AND B / Product Type: *No Product type* /   Insurance ID: [id number] - (Medicare)  Secondary Insurance (if applicable): [facility]   Treatment Diagnosis: : low back pain M54.5, DDD facet arthropathy M46.96   No diagnosis found.   Medical Diagnosis:    Acute bilateral low back pain [M54.50]   Referring Physician: Edi Quintero, *  PCP: Mingo Jimenez MD                             Plan of care signed (Y/N):     Date of Patient follow up with Physician:      Progress Report/POC: NO  POC update due: (10 visits /OR AUTH LIMITS, whichever is less)  2024       Preferred Language for Healthcare:   [x]English       []other:    SUBJECTIVE EXAMINATION     Patient Report/Comments: Pt reports that he feels like he is 50-60% better overall, ~75% better when not doing much, feels ~40% better with activity. Still having difficulty/pain with walking dog. Is going to be getting a block on April 3 and hoping to get his ablation shortly after. Still feels tight/stiff in lower back.      Test used Initial score  1/10/24 2024   Pain Summary VAS 4 0   Functional questionnaire Modified Oswestry 26% 12% disability   Other:                OBJECTIVE EXAMINATION     Observation:     Test measurements:   ROM/Strength: (Blank cells denote NT)        Mvmt (norm) AROM L AROM R Notes PROM L PROM R Notes                        LUMBAR Flex (90) 40 (decreased pain)            Ext (25) 20            SB (25) 15 (decreased pain) 15            Rotation (30)                                       HIP Flex (120) 114 124

## 2024-03-28 ENCOUNTER — APPOINTMENT (OUTPATIENT)
Dept: PHYSICAL THERAPY | Age: 69
End: 2024-03-28
Payer: MEDICARE

## 2024-04-09 ENCOUNTER — HOSPITAL ENCOUNTER (OUTPATIENT)
Dept: PHYSICAL THERAPY | Age: 69
Setting detail: THERAPIES SERIES
Discharge: HOME | End: 2024-04-09
Payer: MEDICARE

## 2024-04-09 PROCEDURE — 97110 THERAPEUTIC EXERCISES: CPT

## 2024-04-09 PROCEDURE — 97140 MANUAL THERAPY 1/> REGIONS: CPT

## 2024-04-09 NOTE — FLOWSHEET NOTE
[] Not Met: [] Adjusted  Pt to improve strength to show motor control/activation of proximal hip and TrA/abdominal to facilitate functional mobility and ADLs.   Status: [x] Progressing: [] Met: [] Not Met: [] Adjusted  Patient will return to Usual work, housework or activities, lifting an object from the floor, light home activities, heavy home activities, and sit for length of time without increased symptoms or restriction to work towards return to prior level of function.  Status: [x] Progressing: [] Met: [] Not Met: [] Adjusted  Patient will resume normal work/leisure activities without pain.            Status: [x] Progressing: [] Met: [] Not Met: [] Adjusted      Overall Progression Towards Functional goals/ Treatment Progress Update:  [x] Patient is progressing as expected towards functional goals listed.    [] Progression is slowed due to complexities/Impairments listed.  [] Progression has been slowed due to co-morbidities.  [] Plan just implemented, too soon (<30days) to assess goals progression   [] Goals require adjustment due to lack of progress  [] Patient is not progressing as expected and requires additional follow up with physician  [] Other:     CHARGE CAPTURE     PT CHARGE GRID   CPT Code (TIMED) minutes # CPT Code (UNTIMED) #     Therex (64203)  16 1  EVAL:LOW (99143 - Typically 20 minutes face-to-face)     Neuromusc. Re-ed (35265)    Re-Eval (90060)     Manual (18456) 28 2  Estim Unattended (22273)     Ther. Act (63363)    Mech. Traction (47570)     Gait (70360)    Dry Needle 1-2 muscle (17019)     Aquatic Therex (00187)    Dry Needle 3+ muscle (20561)     Iontophoresis (71885)    VASO (40252)     Ultrasound (21726)    Group Therapy (69482)     Estim Attended (34051)    Canalith Repositioning (72585)     Other:    Other:    Total Timed Code Tx Minutes 44 3       Total Treatment Minutes 44        Charge Justification:  (93952) THERAPEUTIC EXERCISE - Provided verbal/tactile cueing for activities

## 2024-04-17 ENCOUNTER — HOSPITAL ENCOUNTER (OUTPATIENT)
Dept: PHYSICAL THERAPY | Age: 69
Setting detail: THERAPIES SERIES
Discharge: HOME | End: 2024-04-17
Payer: MEDICARE

## 2024-04-17 PROCEDURE — 97110 THERAPEUTIC EXERCISES: CPT

## 2024-04-17 PROCEDURE — 97140 MANUAL THERAPY 1/> REGIONS: CPT

## 2024-04-17 NOTE — FLOWSHEET NOTE
[facility] - Outpatient Rehabilitation and Therapy [address] [place] [address] office: [phone] fax: [phone]          Physical Therapy: TREATMENT/PROGRESS NOTE   Patient: Shakeel Salgado (68 y.o. male)   Treatment Date: 2024   :  1955 MRN: [id number]   Visit #: 13   Insurance Allowable Auth Needed   [place] []Yes    [x]No    Insurance: Payor: MEDICARE / Plan: MEDICARE PART A AND B / Product Type: *No Product type* /   Insurance ID: [id number] - (Medicare)  Secondary Insurance (if applicable): [facility]   Treatment Diagnosis: : low back pain M54.5, DDD facet arthropathy M46.96   No diagnosis found.   Medical Diagnosis:    Acute bilateral low back pain [M54.50]   Referring Physician: Edi Quintero, *  PCP: Mingo Jimenez MD                             Plan of care signed (Y/N):     Date of Patient follow up with Physician:      Progress Report/POC: NO  POC update due: (10 visits /OR AUTH LIMITS, whichever is less)  2024       Preferred Language for Healthcare:   [x]English       []other:    SUBJECTIVE EXAMINATION     Patient Report/Comments:  Felt pretty good after last session, more loose overall. Will be getting ablation on May 8th. Has been trying to do a lot around the house in preparation for his wife getting her THR- but still has hard time with bending forward.      Test used Initial score  1/10/24 2024   Pain Summary VAS 4 3   Functional questionnaire Modified Oswestry 26% 12% disability last assessment   Other:                OBJECTIVE EXAMINATION     Observation:     Test measurements:   ROM/Strength: (Blank cells denote NT)        Mvmt (norm) AROM L AROM R Notes PROM L PROM R Notes                        LUMBAR Flex (90) 40 (decreased pain)            Ext (25) 20            SB (25) 15 (decreased pain) 15            Rotation (30)                                       HIP Flex (120) 114 124            Abd (45)

## 2024-04-24 ENCOUNTER — HOSPITAL ENCOUNTER (OUTPATIENT)
Dept: PHYSICAL THERAPY | Age: 69
Setting detail: THERAPIES SERIES
Discharge: HOME | End: 2024-04-24
Payer: MEDICARE

## 2024-04-24 PROCEDURE — 97140 MANUAL THERAPY 1/> REGIONS: CPT

## 2024-04-24 PROCEDURE — 97110 THERAPEUTIC EXERCISES: CPT

## 2024-04-24 NOTE — FLOWSHEET NOTE
Term Goals: To be achieved in: 8 weeks  Disability index score of 15% or less for the Modified Oswestry to assist with return top prior level of function.    Status: [] Progressing: [x] Met: [] Not Met: [] Adjusted  Improve  lumbar AROM  Flexion to fingertips to ankle mortise degrees or  better to allow for proper joint functioning as indicated by patients functional deficits.  Status: [x] Progressing: [] Met: [] Not Met: [] Adjusted  Pt to improve strength to show motor control/activation of proximal hip and TrA/abdominal to facilitate functional mobility and ADLs.   Status: [x] Progressing: [] Met: [] Not Met: [] Adjusted  Patient will return to Usual work, housework or activities, lifting an object from the floor, light home activities, heavy home activities, and sit for length of time without increased symptoms or restriction to work towards return to prior level of function.  Status: [x] Progressing: [] Met: [] Not Met: [] Adjusted  Patient will resume normal work/leisure activities without pain.            Status: [x] Progressing: [] Met: [] Not Met: [] Adjusted      Overall Progression Towards Functional goals/ Treatment Progress Update:  [x] Patient is progressing as expected towards functional goals listed.    [] Progression is slowed due to complexities/Impairments listed.  [] Progression has been slowed due to co-morbidities.  [] Plan just implemented, too soon (<30days) to assess goals progression   [] Goals require adjustment due to lack of progress  [] Patient is not progressing as expected and requires additional follow up with physician  [] Other:     CHARGE CAPTURE     PT CHARGE GRID   CPT Code (TIMED) minutes # CPT Code (UNTIMED) #     Therex (36086)  24 2  EVAL:LOW (26494 - Typically 20 minutes face-to-face)     Neuromusc. Re-ed (19010)    Re-Eval (72829)     Manual (45887) 20 1  Estim Unattended (71196)     Ther. Act (02971)    Mech. Traction (18741)     Gait (82127)    Dry Needle 1-2 muscle

## 2024-04-30 ENCOUNTER — HOSPITAL ENCOUNTER (OUTPATIENT)
Dept: PHYSICAL THERAPY | Age: 69
Setting detail: THERAPIES SERIES
Discharge: HOME | End: 2024-04-30
Payer: MEDICARE

## 2024-04-30 PROCEDURE — 97140 MANUAL THERAPY 1/> REGIONS: CPT

## 2024-04-30 PROCEDURE — 97110 THERAPEUTIC EXERCISES: CPT

## 2024-04-30 NOTE — FLOWSHEET NOTE
[facility] - Outpatient Rehabilitation and Therapy [address] [place] [address] office: [phone] fax: [phone]          Physical Therapy: TREATMENT/PROGRESS NOTE   Patient: Shakeel Salgado (69 y.o. male)   Treatment Date: 2024   :  1955 MRN: [id number]   Visit #: 15   Insurance Allowable Auth Needed   [place] []Yes    [x]No    Insurance: Payor: MEDICARE / Plan: MEDICARE PART A AND B / Product Type: *No Product type* /   Insurance ID: [id number] - (Medicare)  Secondary Insurance (if applicable): [facility]   Treatment Diagnosis: : low back pain M54.5, DDD facet arthropathy M46.96      Medical Diagnosis:    Acute bilateral low back pain [M54.50]   Referring Physician: Edi Quintero, *  PCP: Mingo Jimenez MD                             Plan of care signed (Y/N):     Date of Patient follow up with Physician:      Progress Report/POC: NO  POC update due: (10 visits /OR AUTH LIMITS, whichever is less)  2024       Preferred Language for Healthcare:   [x]English       []other:    SUBJECTIVE EXAMINATION     Patient Report/Comments:  Feels that nerve block was of benefit in calming down back and improving mobility. Gets ablation next week.      Test used Initial score  1/10/24 2024   Pain Summary VAS 4 3   Functional questionnaire Modified Oswestry 26% 12% disability last assessment   Other:                OBJECTIVE EXAMINATION     Observation:     Test measurements:   ROM/Strength: (Blank cells denote NT)        Mvmt (norm) AROM L AROM R Notes PROM L PROM R Notes                        LUMBAR Flex (90) 40 (decreased pain)            Ext (25) 20            SB (25) 15 (decreased pain) 15            Rotation (30)                                       HIP Flex (120) 114 124            Abd (45)                ER (50) 56 58            IR (45) 18 22            Ext (20)                                           MMT L MMT R Notes         HIP Flexion

## 2024-05-15 ENCOUNTER — HOSPITAL ENCOUNTER (OUTPATIENT)
Dept: PHYSICAL THERAPY | Age: 69
Setting detail: THERAPIES SERIES
Discharge: HOME | End: 2024-05-15
Payer: MEDICARE

## 2024-05-15 PROCEDURE — 97140 MANUAL THERAPY 1/> REGIONS: CPT

## 2024-05-15 NOTE — FLOWSHEET NOTE
[facility] - Outpatient Rehabilitation and Therapy [address] [place] [address] office: [phone] fax: [phone]          Physical Therapy: TREATMENT/PROGRESS NOTE   Patient: Shakeel Salgado (69 y.o. male)   Treatment Date: 05/15/2024   :  1955 MRN: [id number]   Visit #: 16   Insurance Allowable Auth Needed   [place] []Yes    [x]No    Insurance: Payor: MEDICARE / Plan: MEDICARE PART A AND B / Product Type: *No Product type* /   Insurance ID: [id number] - (Medicare)  Secondary Insurance (if applicable): [facility]   Treatment Diagnosis: : low back pain M54.5, DDD facet arthropathy M46.96      Medical Diagnosis:    Acute bilateral low back pain [M54.50]   Referring Physician: Edi Quintero, *  PCP: Mingo Jimenez MD                             Plan of care signed (Y/N):     Date of Patient follow up with Physician:      Progress Report/POC: NO  POC update due: (10 visits /OR AUTH LIMITS, whichever is less)  2024       Preferred Language for Healthcare:   [x]English       []other:    SUBJECTIVE EXAMINATION     Patient Report/Comments:  Did have ablation done which he thinks was beneficial but he is very tight right now and states he has had a HA/migraine for 8 days.      Test used Initial score  1/10/24 05/15/2024   Pain Summary VAS 4 4   Functional questionnaire Modified Oswestry 26% 12% disability last assessment   Other:                OBJECTIVE EXAMINATION     Observation:     Test measurements:   ROM/Strength: (Blank cells denote NT)        Mvmt (norm) AROM L AROM R Notes PROM L PROM R Notes                        LUMBAR Flex (90) 40 (decreased pain)            Ext (25) 20            SB (25) 15 (decreased pain) 15            Rotation (30)                                       HIP Flex (120) 114 124            Abd (45)                ER (50) 56 58            IR (45) 18 22            Ext (20)                                           MMT L MMT R

## 2024-05-20 ENCOUNTER — HOSPITAL ENCOUNTER (OUTPATIENT)
Dept: PHYSICAL THERAPY | Age: 69
Setting detail: THERAPIES SERIES
Discharge: HOME | End: 2024-05-20
Payer: MEDICARE

## 2024-05-20 PROCEDURE — 97140 MANUAL THERAPY 1/> REGIONS: CPT

## 2024-05-20 PROCEDURE — 97110 THERAPEUTIC EXERCISES: CPT

## 2024-05-20 NOTE — FLOWSHEET NOTE
[facility] - Outpatient Rehabilitation and Therapy [address] [place] [address] office: [phone] fax: [phone]          Physical Therapy: TREATMENT/PROGRESS NOTE   Patient: Shakeel Salgado (69 y.o. male)   Treatment Date: 2024   :  1955 MRN: [id number]   Visit #: 17   Insurance Allowable Auth Needed   [place] []Yes    [x]No    Insurance: Payor: MEDICARE / Plan: MEDICARE PART A AND B / Product Type: *No Product type* /   Insurance ID: [id number] - (Medicare)  Secondary Insurance (if applicable): [facility]   Treatment Diagnosis: : low back pain M54.5, DDD facet arthropathy M46.96      Medical Diagnosis:    Acute bilateral low back pain [M54.50]   Referring Physician: Edi Quintero, *  PCP: Mingo Jimenez MD                             Plan of care signed (Y/N):     Date of Patient follow up with Physician:      Progress Report/POC: NO  POC update due: (10 visits /OR AUTH LIMITS, whichever is less)  2024       Preferred Language for Healthcare:   [x]English       []other:    SUBJECTIVE EXAMINATION     Patient Report/Comments:  HA finally subsided. Thinks the shot helped- but is not pain free. Still feels it in low back a bit.  Seems to have had a big reaction blood sugar wise and HA wise to steroid.       Test used Initial score  1/10/24 2024   Pain Summary VAS 4 4   Functional questionnaire Modified Oswestry 26% 12% disability last assessment   Other:                OBJECTIVE EXAMINATION     Observation:     Test measurements:   ROM/Strength: (Blank cells denote NT)        Mvmt (norm) AROM L AROM R Notes PROM L PROM R Notes                        LUMBAR Flex (90) 40 (decreased pain)            Ext (25) 20            SB (25) 15 (decreased pain) 15            Rotation (30)                                       HIP Flex (120) 114 124            Abd (45)                ER (50) 56 58            IR (45) 18 22            Ext (20)

## 2024-05-29 ENCOUNTER — HOSPITAL ENCOUNTER (OUTPATIENT)
Dept: PHYSICAL THERAPY | Age: 69
Setting detail: THERAPIES SERIES
Discharge: HOME | End: 2024-05-29
Payer: MEDICARE

## 2024-05-29 PROCEDURE — 97140 MANUAL THERAPY 1/> REGIONS: CPT

## 2024-05-29 PROCEDURE — 97110 THERAPEUTIC EXERCISES: CPT

## 2024-05-29 NOTE — PLAN OF CARE
Abduction 4+ 5      ER          IR          Extension 4- 4-                     Exercises/Interventions:     Therapeutic Ex (16412) 15 resistance Sets/time Reps Notes/Cues/Progressions   LTR  2 10    Hooklying alternating march from 90/90 + TA activation   15 March and up/up/down/down   Prone glute + quad activation       Bridge  1 15 Cue form   TA + SLR  1 15/ea Full ROM -    EOB bird dog  1 10    SL rotation stretch- bilat  10 10/ea    POT hip extension bilat 1 10/ea    Multifidus chop green 1 10    DKTC stretch  30 4    SKTC stretch bilat 30 3/ea    1/2 kneel open book/rainbow 10           Manual Intervention (78272) 25  TIME     Mobs/ROM- L hip  5     STM lumbar paraspinals  10     Gentle l/spine mobs and lumbar opening mobilization  5     Prone R hip ER mobs  5     Cervical mobs/STM       NMR re-education (26650) resistance Sets/time Reps CUES NEEDED          SL rotation - bilateral                            Therapeutic Activity (31199)  Sets/time                                          Modalities:    No modalities applied this session    Education/Home Exercise Program: HEP discussed and performed, see exercise grid      ASSESSMENT     Today's Assessment:  A little flared up today but improved quickly with mobility based exercises. Did well with joint mobility and soft tissue work on LS as well as increasing exercises focused core work.        Medical Necessity Documentation:  I certify that this patient meets the below criteria necessary for medical necessity for care and/or justification of therapy services:  The patient has functional impairments and/or activity limitations and would benefit from continued outpatient therapy services to address the deficits outlined in the patients goals  The patient has a musculoskeletal condition(s) with a corresponding ICD-10 code that is of complexity and severity that require skilled therapeutic intervention. This has a direct and significant impact on the need for

## 2024-06-05 ENCOUNTER — HOSPITAL ENCOUNTER (OUTPATIENT)
Dept: PHYSICAL THERAPY | Age: 69
Setting detail: THERAPIES SERIES
Discharge: HOME | End: 2024-06-05
Payer: MEDICARE

## 2024-06-05 PROCEDURE — 97140 MANUAL THERAPY 1/> REGIONS: CPT

## 2024-06-05 PROCEDURE — 97110 THERAPEUTIC EXERCISES: CPT

## 2024-06-05 NOTE — FLOWSHEET NOTE
[facility] - Outpatient Rehabilitation and Therapy [address] [place] [address] office: [phone] fax: [phone]          Physical Therapy: TREATMENT/PROGRESS NOTE   Patient: Shakeel Salgado (69 y.o. male)   Treatment Date: 2024   :  1955 MRN: [id number]   Visit #: 19   Insurance Allowable Auth Needed   [place] []Yes    [x]No    Insurance: Payor: MEDICARE / Plan: MEDICARE PART A AND B / Product Type: *No Product type* /   Insurance ID: [id number] - (Medicare)  Secondary Insurance (if applicable): [facility]   Treatment Diagnosis: : low back pain M54.5, DDD facet arthropathy M46.96      Medical Diagnosis:    Acute bilateral low back pain [M54.50]   Referring Physician: Edi Quintero, *  PCP: Mingo Jimenez MD                             Plan of care signed (Y/N):     Date of Patient follow up with Physician:      Progress Report/POC: NO  POC update due: (10 visits /OR AUTH LIMITS, whichever is less)  2024       Preferred Language for Healthcare:   [x]English       []other:    SUBJECTIVE EXAMINATION     Patient Report/Comments:  Doing well- opened and dumped bags of soil. A little sore through low back.      Test used Initial score  1/10/24 2024   Pain Summary VAS 4 3   Functional questionnaire Modified Oswestry 26% 12% disability last assessment   Other:                OBJECTIVE EXAMINATION     Observation:     Test measurements:   ROM/Strength: (Blank cells denote NT)        Mvmt (norm) AROM L AROM R Notes PROM L PROM R Notes                        LUMBAR Flex (90) knees            Ext (25) 20            SB (25) 20 20            Rotation (30)                                       HIP Flex (120) 115 115            Abd (45)                ER (50) 56 58            IR (45) 17 20            Ext (20)                                           MMT L MMT R Notes         HIP Flexion 4+ 4+      Abduction 4+ 5      ER          IR

## 2024-06-10 ENCOUNTER — HOSPITAL ENCOUNTER (OUTPATIENT)
Dept: PHYSICAL THERAPY | Age: 69
Setting detail: THERAPIES SERIES
Discharge: HOME | End: 2024-06-10
Payer: MEDICARE

## 2024-06-10 PROCEDURE — 97110 THERAPEUTIC EXERCISES: CPT

## 2024-06-10 PROCEDURE — 97140 MANUAL THERAPY 1/> REGIONS: CPT

## 2024-06-10 NOTE — FLOWSHEET NOTE
Exercises/Interventions:     Therapeutic Ex (14142) 25 resistance Sets/time Reps Notes/Cues/Progressions   LTR  2 10    Hooklying alternating march from 90/90 + TA activation   15 March and up/up/down/down   Prone glute + quad activation       Bridge Hold 5 1 15 Cue form   TA + SLR  2 15 Full ROM -    EOB bird dog  1 10    SL rotation stretch- bilat  10 10/ea    POT hip extension bilat 1 10/ea    Multifidus chop green 1 10 In tandem   DKTC stretch       SKTC stretch bilat 30 3/ea    1/2 kneel open book/rainbow 10    Lat walk  2 20    Manual Intervention (19867) 15  TIME     Mobs/ROM- L hip  5     STM lumbar paraspinals  10     Gentle l/spine mobs and lumbar opening mobilization       Prone R hip ER mobs       Cervical mobs/STM       NMR re-education (65249) resistance Sets/time Reps CUES NEEDED          SL rotation - bilateral                            Therapeutic Activity (59295)  Sets/time                                          Modalities:    No modalities applied this session    Education/Home Exercise Program: HEP discussed and performed, see exercise grid      ASSESSMENT     Today's Assessment:  Did well with all exercises today- overall doing very well. Low level LS soreness likely degenerative process but is doing well with ROM and strength.        Medical Necessity Documentation:  I certify that this patient meets the below criteria necessary for medical necessity for care and/or justification of therapy services:  The patient has functional impairments and/or activity limitations and would benefit from continued outpatient therapy services to address the deficits outlined in the patients goals  The patient has a musculoskeletal condition(s) with a corresponding ICD-10 code that is of complexity and severity that require skilled therapeutic intervention. This has a direct and significant impact on the need for therapy and significantly impacts the rate of recovery.   The patient has a complexity

## 2024-06-19 ENCOUNTER — HOSPITAL ENCOUNTER (OUTPATIENT)
Dept: PHYSICAL THERAPY | Age: 69
Setting detail: THERAPIES SERIES
Discharge: HOME | End: 2024-06-19
Payer: MEDICARE

## 2024-06-19 PROCEDURE — 97140 MANUAL THERAPY 1/> REGIONS: CPT

## 2024-06-19 PROCEDURE — 97110 THERAPEUTIC EXERCISES: CPT

## 2024-06-19 NOTE — FLOWSHEET NOTE
prevent loss of range of motion, maintain or improve muscular strength or increase flexibility, following either an injury or surgery.  (06063) NEUROMUSCULAR RE-EDUCATION - Therapeutic procedure, 1 or more areas, each 15 minutes; neuromuscular reeducation of movement, balance, coordination, kinesthetic sense, posture, and/or proprioception for sitting and/or standing activities  (70415) THERAPEUTIC ACTIVITY - use of dynamic activities to improve functional performance. (Ex include squatting, ascending/descending stairs, walking, bending, lifting, catching, throwing, pushing, pulling, jumping.)  Direct, one on one contact, billed in 15-minute increments.  (58675) MANUAL THERAPY -  Manual therapy techniques, 1 or more regions, each 15 minutes (Mobilization/manipulation, manual lymphatic drainage, manual traction) for the purpose of modulating pain, promoting relaxation,  increasing ROM, reducing/eliminating soft tissue swelling/inflammation/restriction, improving soft tissue extensibility and allowing for proper ROM for normal function with self care, mobility, lifting and ambulation    TREATMENT PLAN   Plan:  progress back in to strength and mobility as tolerated.continue 1x/week x 4 weeks     Electronically Signed by Jacquie Segal, PT              Date: 06/19/2024     Note: If patient does not return for scheduled/recommended follow up visits, this note will serve as a discharge from care along with the most recent update on progress.

## 2024-06-21 ENCOUNTER — HOSPITAL ENCOUNTER (OUTPATIENT)
Dept: PHYSICAL THERAPY | Age: 69
Setting detail: THERAPIES SERIES
Discharge: HOME | End: 2024-06-21
Payer: MEDICARE

## 2024-06-21 PROCEDURE — 97110 THERAPEUTIC EXERCISES: CPT

## 2024-06-21 PROCEDURE — 97140 MANUAL THERAPY 1/> REGIONS: CPT

## 2024-06-21 NOTE — FLOWSHEET NOTE
[facility] - Outpatient Rehabilitation and Therapy [address] [place] [address] office: [phone] fax: [phone]          Physical Therapy: TREATMENT/PROGRESS NOTE   Patient: Shakeel Salgado (69 y.o. male)   Treatment Date: 2024   :  1955 MRN: [id number]   Visit #: 22   Insurance Allowable Auth Needed   [place]are[place] []Yes    [x]No    Insurance: Payor: MEDICARE / Plan: MEDICARE PART A AND B / Product Type: *No Product type* /   Insurance ID: [id number] - (Medicare)  Secondary Insurance (if applicable): [facility]   Treatment Diagnosis: : low back pain M54.5, DDD facet arthropathy M46.96      Medical Diagnosis:    Acute bilateral low back pain [M54.50]   Referring Physician: Edi Quintero, *  PCP: Mingo Jimenez MD                             Plan of care signed (Y/N):     Date of Patient follow up with Physician:      Progress Report/POC: NO  POC update due: (10 visits /OR AUTH LIMITS, whichever is less)  2024       Preferred Language for Healthcare:   [x]English       []other:    SUBJECTIVE EXAMINATION     Patient Report/Comments:  Feeling much better today- just global stiffness.      Test used Initial score  1/10/24 2024   Pain Summary VAS 4 3   Functional questionnaire Modified Oswestry 26% 12% disability last assessment   Other:                OBJECTIVE EXAMINATION     Observation:     Test measurements:   ROM/Strength: (Blank cells denote NT)        Mvmt (norm) AROM L AROM R Notes PROM L PROM R Notes                        LUMBAR Flex (90) knees            Ext (25) 20            SB (25) 20 20            Rotation (30)                                       HIP Flex (120) 115 115            Abd (45)                ER (50) 56 58            IR (45) 17 20            Ext (20)                                           MMT L MMT R Notes         HIP Flexion 4+ 4+      Abduction 4+ 5      ER          IR          Extension 4- 4-

## 2024-06-24 ENCOUNTER — HOSPITAL ENCOUNTER (OUTPATIENT)
Dept: PHYSICAL THERAPY | Age: 69
Setting detail: THERAPIES SERIES
Discharge: HOME | End: 2024-06-24
Payer: MEDICARE

## 2024-06-24 PROCEDURE — 97140 MANUAL THERAPY 1/> REGIONS: CPT

## 2024-06-24 PROCEDURE — 97110 THERAPEUTIC EXERCISES: CPT

## 2024-06-24 NOTE — FLOWSHEET NOTE
improve muscular strength or increase flexibility, following either an injury or surgery.  (16876) NEUROMUSCULAR RE-EDUCATION - Therapeutic procedure, 1 or more areas, each 15 minutes; neuromuscular reeducation of movement, balance, coordination, kinesthetic sense, posture, and/or proprioception for sitting and/or standing activities  (52267) THERAPEUTIC ACTIVITY - use of dynamic activities to improve functional performance. (Ex include squatting, ascending/descending stairs, walking, bending, lifting, catching, throwing, pushing, pulling, jumping.)  Direct, one on one contact, billed in 15-minute increments.  (07043) MANUAL THERAPY -  Manual therapy techniques, 1 or more regions, each 15 minutes (Mobilization/manipulation, manual lymphatic drainage, manual traction) for the purpose of modulating pain, promoting relaxation,  increasing ROM, reducing/eliminating soft tissue swelling/inflammation/restriction, improving soft tissue extensibility and allowing for proper ROM for normal function with self care, mobility, lifting and ambulation    TREATMENT PLAN   Plan:  progress back in to strength and mobility as tolerated.continue 1x/week x 4 weeks     Electronically Signed by Janene Schmidt PT              Date: 06/24/2024     Note: If patient does not return for scheduled/recommended follow up visits, this note will serve as a discharge from care along with the most recent update on progress.

## 2024-07-02 ENCOUNTER — HOSPITAL ENCOUNTER (OUTPATIENT)
Dept: PHYSICAL THERAPY | Age: 69
Setting detail: THERAPIES SERIES
Discharge: HOME | End: 2024-07-02
Payer: MEDICARE

## 2024-07-02 PROCEDURE — 97110 THERAPEUTIC EXERCISES: CPT

## 2024-07-02 PROCEDURE — 97140 MANUAL THERAPY 1/> REGIONS: CPT

## 2024-07-02 NOTE — FLOWSHEET NOTE
increased symptoms or restriction to work towards return to prior level of function.  Status: [x] Progressing: [] Met: [] Not Met: [] Adjusted  Patient will resume normal work/leisure activities without pain.            Status: [x] Progressing: [] Met: [] Not Met: [] Adjusted      Overall Progression Towards Functional goals/ Treatment Progress Update:  [x] Patient is progressing as expected towards functional goals listed.    [] Progression is slowed due to complexities/Impairments listed.  [] Progression has been slowed due to co-morbidities.  [] Plan just implemented, too soon (<30days) to assess goals progression   [] Goals require adjustment due to lack of progress  [] Patient is not progressing as expected and requires additional follow up with physician  [] Other:     CHARGE CAPTURE     PT CHARGE GRID   CPT Code (TIMED) minutes # CPT Code (UNTIMED) #     Therex (76972)  10 1  EVAL:LOW (56215 - Typically 20 minutes face-to-face)     Neuromusc. Re-ed (25412)    Re-Eval (79118)     Manual (57760) 29 2  Estim Unattended (27011)     Ther. Act (27438)    Mech. Traction (39932)     Gait (48444)    Dry Needle 1-2 muscle (45060)     Aquatic Therex (90304)    Dry Needle 3+ muscle (20561)     Iontophoresis (38176)    VASO (19552)     Ultrasound (46134)    Group Therapy (49465)     Estim Attended (57860)    Canalith Repositioning (09516)     Other:    Other:    Total Timed Code Tx Minutes 39 3       Total Treatment Minutes 39        Charge Justification:  (70987) THERAPEUTIC EXERCISE - Provided verbal/tactile cueing for activities related to strengthening, flexibility, endurance, ROM performed to prevent loss of range of motion, maintain or improve muscular strength or increase flexibility, following either an injury or surgery.   (64116) HOME EXERCISE PROGRAM - Reviewed/Progressed HEP activities related to strengthening, flexibility, endurance, ROM performed to prevent loss of range of motion, maintain or improve

## 2024-07-10 ENCOUNTER — HOSPITAL ENCOUNTER (OUTPATIENT)
Dept: PHYSICAL THERAPY | Age: 69
Setting detail: THERAPIES SERIES
Discharge: HOME | End: 2024-07-10
Payer: MEDICARE

## 2024-07-10 PROCEDURE — 97110 THERAPEUTIC EXERCISES: CPT

## 2024-07-10 PROCEDURE — 97140 MANUAL THERAPY 1/> REGIONS: CPT

## 2024-07-10 NOTE — FLOWSHEET NOTE
[facility] - Outpatient Rehabilitation and Therapy [address] [place] [address] office: [phone] fax: [phone]          Physical Therapy: TREATMENT/PROGRESS NOTE   Patient: Shakeel Salgado (69 y.o. male)   Treatment Date: 07/10/2024   :  1955 MRN: [id number]   Visit #: 25   Insurance Allowable Auth Needed   [place] []Yes    [x]No    Insurance: Payor: MEDICARE / Plan: MEDICARE PART A AND B / Product Type: *No Product type* /   Insurance ID: [id number] - (Medicare)  Secondary Insurance (if applicable): [facility]   Treatment Diagnosis: : low back pain M54.5, DDD facet arthropathy M46.96      Medical Diagnosis:    Acute bilateral low back pain [M54.50]   Referring Physician: Edi Quintero, *  PCP: Mingo Jimenez MD                             Plan of care signed (Y/N):     Date of Patient follow up with Physician:      Progress Report/POC: NO  POC update due: (10 visits /OR AUTH LIMITS, whichever is less)  2024       Preferred Language for Healthcare:   [x]English       []other:    SUBJECTIVE EXAMINATION     Patient Report/Comments:  Reports that he had to move a lot of stuff the other day and his back is feeling pretty sore and tight due to this.      Test used Initial score  1/10/24 07/10/2024   Pain Summary VAS 4 3   Functional questionnaire Modified Oswestry 26% 12% disability last assessment   Other:                OBJECTIVE EXAMINATION     Observation:     Test measurements:   ROM/Strength: (Blank cells denote NT)        Mvmt (norm) AROM L AROM R Notes PROM L PROM R Notes                        LUMBAR Flex (90) knees            Ext (25) 20            SB (25) 20 20            Rotation (30)                                       HIP Flex (120) 115 115            Abd (45)                ER (50) 56 58            IR (45) 17 20            Ext (20)                                           MMT L MMT R Notes         HIP Flexion 4+ 4+      Abduction 4+  Otezla Counseling: The side effects of Otezla were discussed with the patient, including but not limited to worsening or new depression, weight loss, diarrhea, nausea, upper respiratory tract infection, and headache. Patient instructed to call the office should any adverse effect occur.  The patient verbalized understanding of the proper use and possible adverse effects of Otezla.  All the patient's questions and concerns were addressed.

## 2024-07-16 ENCOUNTER — HOSPITAL ENCOUNTER (OUTPATIENT)
Dept: PHYSICAL THERAPY | Age: 69
Setting detail: THERAPIES SERIES
Discharge: HOME | End: 2024-07-16
Payer: MEDICARE

## 2024-07-16 PROCEDURE — 97140 MANUAL THERAPY 1/> REGIONS: CPT

## 2024-07-16 PROCEDURE — 97110 THERAPEUTIC EXERCISES: CPT

## 2024-07-16 NOTE — FLOWSHEET NOTE
[facility] - Outpatient Rehabilitation and Therapy [address] [place] [address] office: [phone] fax: [phone]          Physical Therapy: TREATMENT/PROGRESS NOTE   Patient: Shakeel Salgado (69 y.o. male)   Treatment Date: 2024   :  1955 MRN: [id number]   Visit #: 26   Insurance Allowable Auth Needed   [place] []Yes    [x]No    Insurance: Payor: MEDICARE / Plan: MEDICARE PART A AND B / Product Type: *No Product type* /   Insurance ID: [id number] - (Medicare)  Secondary Insurance (if applicable): [facility]   Treatment Diagnosis: : low back pain M54.5, DDD facet arthropathy M46.96      Medical Diagnosis:    Acute bilateral low back pain [M54.50]   Referring Physician: Edi Quintero, *  PCP: Mingo Jimenez MD                             Plan of care signed (Y/N):     Date of Patient follow up with Physician:      Progress Report/POC: NO  POC update due: (10 visits /OR AUTH LIMITS, whichever is less)  8/15/2024       Preferred Language for Healthcare:   [x]English       []other:    SUBJECTIVE EXAMINATION     Patient Report/Comments:  Reports that he had to take 2 ibuprofen and this was helpful. Notes that he called Dr James and thinking that he may need an epidural lower in his back due to the stenosis. If so, may see Dr Dodson for this. States that he ordered a new mattress and will be getting this on Friday- hoping this will help.      Test used Initial score  1/10/24 2024   Pain Summary VAS 4 3   Functional questionnaire Modified Oswestry 26% 12% disability last assessment   Other:                OBJECTIVE EXAMINATION     Observation:     Test measurements:   ROM/Strength: (Blank cells denote NT)        Mvmt (norm) AROM L AROM R Notes PROM L PROM R Notes                        LUMBAR Flex (90) knees            Ext (25) 20            SB (25) 20 20            Rotation (30)                                       HIP Flex (120) 115 115

## 2024-07-30 ENCOUNTER — HOSPITAL ENCOUNTER (OUTPATIENT)
Dept: PHYSICAL THERAPY | Age: 69
Setting detail: THERAPIES SERIES
Discharge: HOME | End: 2024-07-30
Payer: MEDICARE

## 2024-07-30 PROCEDURE — 97140 MANUAL THERAPY 1/> REGIONS: CPT

## 2024-07-30 PROCEDURE — 97032 APPL MODALITY 1+ESTIM EA 15: CPT

## 2024-07-30 PROCEDURE — 20560 NDL INSJ W/O NJX 1 OR 2 MUSC: CPT

## 2024-07-30 PROCEDURE — 97110 THERAPEUTIC EXERCISES: CPT

## 2024-07-30 NOTE — FLOWSHEET NOTE
[facility] - Outpatient Rehabilitation and Therapy [address] [place] [address] office: [phone] fax: [phone]          Physical Therapy: TREATMENT/PROGRESS NOTE   Patient: Shakeel Salgado (69 y.o. male)   Treatment Date: 2024   :  1955 MRN: [id number]   Visit #: 27   Insurance Allowable Auth Needed   [place]Yes    [x]No    Insurance: Payor: MEDICARE / Plan: MEDICARE PART A AND B / Product Type: *No Product type* /   Insurance ID: [id number] - (Medicare)  Secondary Insurance (if applicable): [facility]   Treatment Diagnosis: : low back pain M54.5, DDD facet arthropathy M46.96      Medical Diagnosis:    Acute bilateral low back pain [M54.50]   Referring Physician: Edi Quintero, *  PCP: Mingo Jimenez MD                             Plan of care signed (Y/N):     Date of Patient follow up with Physician:      Progress Report/POC: NO  POC update due: (10 visits /OR AUTH LIMITS, whichever is less)  2024       Preferred Language for Healthcare:   [x]English       []other:    SUBJECTIVE EXAMINATION     Patient Report/Comments:  Reports that he had to take a tramadol this morning and ride over with the seat heaters on, which helped some. Agreeable to DN.      Test used Initial score  1/10/24 2024   Pain Summary VAS 4 3   Functional questionnaire Modified Oswestry 26% 12% disability last assessment   Other:                OBJECTIVE EXAMINATION     Observation:     Test measurements:   ROM/Strength: (Blank cells denote NT)        Mvmt (norm) AROM L AROM R Notes PROM L PROM R Notes                        LUMBAR Flex (90) knees            Ext (25) 20            SB (25) 20 20            Rotation (30)                                       HIP Flex (120) 115 115            Abd (45)                ER (50) 56 58            IR (45) 17 20            Ext (20)                                           MMT L MMT R Notes         HIP Flexion 4+ 4+

## 2024-08-07 ENCOUNTER — HOSPITAL ENCOUNTER (OUTPATIENT)
Dept: PHYSICAL THERAPY | Age: 69
Setting detail: THERAPIES SERIES
Discharge: HOME | End: 2024-08-07
Payer: MEDICARE

## 2024-08-07 PROCEDURE — 97140 MANUAL THERAPY 1/> REGIONS: CPT

## 2024-08-07 PROCEDURE — 97110 THERAPEUTIC EXERCISES: CPT

## 2024-08-12 ENCOUNTER — HOSPITAL ENCOUNTER (OUTPATIENT)
Dept: PHYSICAL THERAPY | Age: 69
Setting detail: THERAPIES SERIES
Discharge: HOME | End: 2024-08-12
Payer: MEDICARE

## 2024-08-12 PROCEDURE — 97110 THERAPEUTIC EXERCISES: CPT

## 2024-08-12 PROCEDURE — 97140 MANUAL THERAPY 1/> REGIONS: CPT

## 2024-08-12 NOTE — FLOWSHEET NOTE
R Notes         HIP Flexion 4+ 4+      Abduction 4+ 5      ER          IR          Extension 4- 4-                     Exercises/Interventions:     Therapeutic Ex (79445) 15 resistance Sets/time Reps Notes/Cues/Progressions   LTR  10 10    Hooklying alternating march from 90/90 + TA activation  2 10 March and up/up/down/down   Prone glute + quad activation  10 10    Prone bird dog       Bridge Hold 5 1 15 Cue form   TA + SLR  2 15 Full ROM -    Hip ER stretch  30 4    SL rotation stretch- bilat  10 10/ea    POT hip extension bilat 1 10/ea    Multifidus chop green   In tandem   DKTC stretch       SKTC stretch bilat 30 3/ea    1/2 kneel open book/rainbow     Lat walk       Manual Intervention (39926) 28  TIME     Mobs/ROM- bilateral belt mobs hip, LAD bilat  5     STM lumbar paraspinals  10     Gentle l/spine mobs and lumbar opening mobilization, t/spine mobs  8     Prone B hip ER mobs  5     Cervical mobs/STM       NMR re-education (49857) resistance Sets/time Reps CUES NEEDED          SL rotation - bilateral                            Therapeutic Activity (35597)  Sets/time                              Modalities:    No modalities applied this session    Education/Home Exercise Program: HEP discussed and performed, see exercise grid      ASSESSMENT     Today's Assessment:  Global guarding, mostly muscular in nature. Significantly improved after manual session and mobility exercises.         Medical Necessity Documentation:  I certify that this patient meets the below criteria necessary for medical necessity for care and/or justification of therapy services:  The patient has functional impairments and/or activity limitations and would benefit from continued outpatient therapy services to address the deficits outlined in the patients goals  The patient has a musculoskeletal condition(s) with a corresponding ICD-10 code that is of complexity and severity that require skilled therapeutic intervention. This has a direct

## 2024-08-22 ENCOUNTER — HOSPITAL ENCOUNTER (OUTPATIENT)
Dept: PHYSICAL THERAPY | Age: 69
Setting detail: THERAPIES SERIES
Discharge: HOME | End: 2024-08-22
Payer: MEDICARE

## 2024-08-22 PROCEDURE — 97140 MANUAL THERAPY 1/> REGIONS: CPT

## 2024-08-22 NOTE — PLAN OF CARE
LUMBAR Flex (90) Knees-45            Ext (25) 25            SB (25) 15 12            Rotation (30)                                       HIP Flex (120) 111 110            Abd (45)                ER (50) 55 58            IR (45) 8 16            Ext (20)                                           MMT L MMT R Notes         HIP Flexion 4+ 4+      Abduction 4+ 4+      ER          IR          Extension 4 4                     Exercises/Interventions:     Therapeutic Ex (47363) 0 resistance Sets/time Reps Notes/Cues/Progressions   LTR  10 10    Hooklying alternating march from 90/90 + TA activation  2 10 March and up/up/down/down   Prone glute + quad activation  10 10    Prone bird dog       Bridge Hold 5 1 15 Cue form   TA + SLR  2 15 Full ROM -    Hip ER stretch  30 4    SL rotation stretch- bilat  10 10/ea    POT hip extension bilat 1 10/ea    Multifidus chop green   In tandem   DKTC stretch       SKTC stretch bilat 30 3/ea    1/2 kneel open book/rainbow     Lat walk       Manual Intervention (52355) 34  TIME     Mobs/ROM- bilateral belt mobs hip  6     LAD bilat  4     STM lumbar paraspinals  10     Gentle l/spine mobs and lumbar opening mobilization, t/spine mobs  8     Prone B hip ER mobs  6     Cervical mobs/STM       NMR re-education (80207) resistance Sets/time Reps CUES NEEDED          SL rotation - bilateral                            Therapeutic Activity (85018)  Sets/time                              Modalities:    No modalities applied this session    Education/Home Exercise Program: HEP discussed and performed, see exercise grid      ASSESSMENT     Today's Assessment:  Patient has been seen for additional 10 visits of physical therapy. Has been making good progress in overall ROM, however very tight in bilateral hips today. Spent more time today with manual therapy. Needs further strength of core and proximal hip. Will benefit from continued PT services 1x week to address noted deficits.          Medical

## 2024-08-26 ENCOUNTER — HOSPITAL ENCOUNTER (OUTPATIENT)
Dept: PHYSICAL THERAPY | Age: 69
Setting detail: THERAPIES SERIES
Discharge: HOME | End: 2024-08-26
Payer: MEDICARE

## 2024-08-26 PROCEDURE — 97110 THERAPEUTIC EXERCISES: CPT

## 2024-08-26 PROCEDURE — 97140 MANUAL THERAPY 1/> REGIONS: CPT

## 2024-08-26 NOTE — FLOWSHEET NOTE
[facility] - Outpatient Rehabilitation and Therapy [address] [place] [address] office: [phone] fax: [phone]  Physical Therapy: TREATMENT/PROGRESS NOTE   Patient: Shakeel Salgado (69 y.o. male)   Treatment Date: 2024   :  1955 MRN: [id number]   Visit #: 31   Insurance Allowable Auth Needed   [place] []Yes    [x]No    Insurance: Payor: MEDICARE / Plan: MEDICARE PART A AND B / Product Type: *No Product type* /   Insurance ID: [id number] - (Medicare)  Secondary Insurance (if applicable): [facility]   Treatment Diagnosis: : low back pain M54.5, DDD facet arthropathy M46.96      Medical Diagnosis:    Acute bilateral low back pain [M54.50]   Referring Physician: Edi Quintero, *  PCP: Mingo Jimenez MD                             Plan of care signed (Y/N):     Date of Patient follow up with Physician:      Progress Report/POC: YES, Date Range for this report: 2024 to 2024  POC update due: (10 visits /OR AUTH LIMITS, whichever is less)  2024       Preferred Language for Healthcare:   [x]English       []other:    SUBJECTIVE EXAMINATION     Patient Report/Comments:  Notes that he felt ok after last session. Feels like mattress is helping some, is a bit more firm than his old mattress. States that he always wakes up a little sore in the morning. Is hurting more this afternoon, but did take 3 dogs on a walk this morning. States that he had to drive over with his seat heaters on.      Test used Initial score  1/10/24 2024   Pain Summary VAS 4 2-7/10   Functional questionnaire Modified Oswestry 26% 14% disability   Other:                OBJECTIVE EXAMINATION     Observation:     Test measurements:   ROM/Strength: (Blank cells denote NT)        Mvmt (norm) AROM L AROM R Notes PROM L PROM R Notes                        LUMBAR Flex (90) Knees-45            Ext (25) 25            SB (25) 15 12            Rotation (30)                                        HIP Flex (120) 111 110            Abd (45)                ER (50) 55 58            IR (45) 8 16            Ext (20)                                           MMT L MMT R Notes         HIP Flexion 4+ 4+      Abduction 4+ 4+      ER          IR          Extension 4 4                     Exercises/Interventions:     Therapeutic Ex (20695) 16 resistance Sets/time Reps Notes/Cues/Progressions   LTR  10 10    Hooklying alternating march from 90/90 + TA activation  2 10 March and up/up/down/down   Prone glute + quad activation  10 10    Prone bird dog  5sec 10/ea    Bridge Hold 5 1 15 Cue form   TA + SLR 2.5 lb 2 15 Full ROM -    Hip ER stretch  0     SL rotation stretch- bilat  10 10/ea    POT hip extension bilat 1 10/ea    Multifidus chop green   In tandem   DKTC stretch       SKTC stretch bilat 30 3/ea    1/2 kneel open book/rainbow     Lat walk       Manual Intervention (81915) 24  TIME     Mobs/ROM- bilateral belt mobs hip  0     LAD bilat  0     STM lumbar paraspinals  10     Gentle l/spine mobs and lumbar opening mobilization, t/spine mobs  8     Prone B hip ER mobs, hip extension mobs  6     Cervical mobs/STM       NMR re-education (65822) resistance Sets/time Reps CUES NEEDED          SL rotation - bilateral                            Therapeutic Activity (77510)  Sets/time                              Modalities:    No modalities applied this session    Education/Home Exercise Program: HEP discussed and performed, see exercise grid      ASSESSMENT     Today's Assessment:  Patient very tight in R lumbar spine and L hip extension. Patient with good improvement in all joint mobility and ROM with manual therapy. Addressed strengthening of core and proximal hip with appropriate fatigue and able to tolerate light increase in strength. Needs further strength of core and proximal hip. Will benefit from continued PT services 1x week to address noted deficits.          Medical Necessity Documentation:  I

## 2024-09-04 ENCOUNTER — HOSPITAL ENCOUNTER (OUTPATIENT)
Dept: PHYSICAL THERAPY | Age: 69
Setting detail: THERAPIES SERIES
Discharge: HOME | End: 2024-09-04
Payer: MEDICARE

## 2024-09-04 PROCEDURE — 97140 MANUAL THERAPY 1/> REGIONS: CPT

## 2024-09-04 PROCEDURE — 97110 THERAPEUTIC EXERCISES: CPT

## 2024-09-04 NOTE — FLOWSHEET NOTE
Abduction 4+ 4+      ER          IR          Extension 4 4                     Exercises/Interventions:     Therapeutic Ex (95292) 16 resistance Sets/time Reps Notes/Cues/Progressions   LTR  10 10    Hooklying alternating march from 90/90 + TA activation  2 10 March and up/up/down/down   Prone glute + quad activation  10 10    Prone bird dog  5sec 10/ea    Bridge Hold 5 1 15 Cue form   TA + SLR 3 lb 1 15 Full ROM -    Hip ER stretch  0     SL rotation stretch- bilat  10 10/ea    POT hip extension bilat 1 10/ea    Multifidus chop green   In tandem   DKTC stretch       SKTC stretch bilat 30 3/ea    1/2 kneel open book/rainbow     Lat walk       Manual Intervention (41617) 24  TIME     Mobs/ROM- bilateral belt mobs hip  0     LAD bilat  0     STM lumbar paraspinals  10     Gentle l/spine mobs and lumbar opening mobilization, t/spine mobs  8     Prone B hip ER mobs, hip extension mobs  6     Cervical mobs/STM       NMR re-education (96170) resistance Sets/time Reps CUES NEEDED          SL rotation - bilateral                            Therapeutic Activity (19942)  Sets/time                              Modalities:    No modalities applied this session    Education/Home Exercise Program: HEP discussed and performed, see exercise grid      ASSESSMENT     Today's Assessment: Patient with some improvement in lumbar restrictions today, soft tissue tighter through R > L lumbar region. Patient with good improvement in all joint mobility and ROM with manual therapy. Addressed strengthening of core and proximal hip with appropriate fatigue and able to tolerate light increase in strength. Needs further strength of core and proximal hip. Will benefit from continued PT services 1x week to address noted deficits.         Medical Necessity Documentation:  I certify that this patient meets the below criteria necessary for medical necessity for care and/or justification of therapy services:  The patient has functional impairments

## 2024-09-10 ENCOUNTER — HOSPITAL ENCOUNTER (OUTPATIENT)
Dept: PHYSICAL THERAPY | Age: 69
Setting detail: THERAPIES SERIES
Discharge: HOME | End: 2024-09-10
Payer: MEDICARE

## 2024-09-10 PROCEDURE — 97140 MANUAL THERAPY 1/> REGIONS: CPT

## 2024-09-10 PROCEDURE — 97110 THERAPEUTIC EXERCISES: CPT

## 2024-09-24 ENCOUNTER — HOSPITAL ENCOUNTER (OUTPATIENT)
Dept: PHYSICAL THERAPY | Age: 69
Setting detail: THERAPIES SERIES
Discharge: HOME | End: 2024-09-24
Payer: MEDICARE

## 2024-09-24 PROCEDURE — 97140 MANUAL THERAPY 1/> REGIONS: CPT

## 2024-09-24 PROCEDURE — 97110 THERAPEUTIC EXERCISES: CPT

## 2024-10-01 ENCOUNTER — HOSPITAL ENCOUNTER (OUTPATIENT)
Dept: PHYSICAL THERAPY | Age: 69
Setting detail: THERAPIES SERIES
Discharge: HOME | End: 2024-10-01
Payer: MEDICARE

## 2024-10-01 PROCEDURE — 97140 MANUAL THERAPY 1/> REGIONS: CPT

## 2024-10-01 PROCEDURE — 97110 THERAPEUTIC EXERCISES: CPT

## 2024-10-01 NOTE — FLOWSHEET NOTE
[facility] - Outpatient Rehabilitation and Therapy [address] [place] [address] office: [phone] fax: [phone]  Physical Therapy: TREATMENT/PROGRESS NOTE   Patient: Shakeel Salgado (69 y.o. male)   Treatment Date: 10/01/2024   :  1955 MRN: [id number]   Visit #: 35   Insurance Allowable Auth Needed   [place] []Yes    [x]No    Insurance: Payor: MEDICARE / Plan: MEDICARE PART A AND B / Product Type: *No Product type* /   Insurance ID: [id number] - (Medicare)  Secondary Insurance (if applicable): [facility]   Treatment Diagnosis: : low back pain M54.5, DDD facet arthropathy M46.96      Medical Diagnosis:    Acute bilateral low back pain [M54.50]   Referring Physician: Edi Quintero, *  PCP: Mingo Jimenez MD                             Plan of care signed (Y/N):     Date of Patient follow up with Physician:      Progress Report/POC: NO  POC update due: (10 visits /OR AUTH LIMITS, whichever is less)  10/31/2024       Preferred Language for Healthcare:   [x]English       []other:    SUBJECTIVE EXAMINATION     Patient Report/Comments:  Pt reports that his back is sore. Had to carry 24 pk to car and didn't feel good. Back more sore with having to tend to dog who was having seizures and had to be put down.      Test used Initial score  1/10/24 10/01/2024   Pain Summary VAS 4 2/10   Functional questionnaire Modified Oswestry 26% 14% disability   Other:                OBJECTIVE EXAMINATION     Observation:     Test measurements:   ROM/Strength: (Blank cells denote NT)        Mvmt (norm) AROM L AROM R Notes PROM L PROM R Notes                        LUMBAR Flex (90) Knees-45            Ext (25) 25            SB (25) 15 12            Rotation (30)                                       HIP Flex (120) 111 110            Abd (45)                ER (50) 55 58            IR (45) 8 16            Ext (20)                                           MMT L MMT R Notes

## 2024-10-08 ENCOUNTER — HOSPITAL ENCOUNTER (OUTPATIENT)
Dept: PHYSICAL THERAPY | Age: 69
Setting detail: THERAPIES SERIES
Discharge: HOME | End: 2024-10-08
Payer: MEDICARE

## 2024-10-08 PROCEDURE — 97110 THERAPEUTIC EXERCISES: CPT

## 2024-10-08 PROCEDURE — 97140 MANUAL THERAPY 1/> REGIONS: CPT

## 2024-10-08 NOTE — FLOWSHEET NOTE
ankle mortise degrees or  better to allow for proper joint functioning as indicated by patients functional deficits.  Status: [x] Progressing: [] Met: [] Not Met: [] Adjusted  Pt to improve strength to show motor control/activation of proximal hip and TrA/abdominal to facilitate functional mobility and ADLs.   Status: [x] Progressing: [] Met: [] Not Met: [] Adjusted  Patient will return to Usual work, housework or activities, lifting an object from the floor, light home activities, heavy home activities, and sit for length of time without increased symptoms or restriction to work towards return to prior level of function.  Status: [x] Progressing: [] Met: [] Not Met: [] Adjusted  Patient will resume normal work/leisure activities without pain.            Status: [x] Progressing: [] Met: [] Not Met: [] Adjusted      Overall Progression Towards Functional goals/ Treatment Progress Update:  [x] Patient is progressing as expected towards functional goals listed.    [] Progression is slowed due to complexities/Impairments listed.  [] Progression has been slowed due to co-morbidities.  [] Plan just implemented, too soon (<30days) to assess goals progression   [] Goals require adjustment due to lack of progress  [] Patient is not progressing as expected and requires additional follow up with physician  [] Other:     CHARGE CAPTURE     PT CHARGE GRID   CPT Code (TIMED) minutes # CPT Code (UNTIMED) #     Therex (45073)  18 1  EVAL:LOW (15097 - Typically 20 minutes face-to-face)     Neuromusc. Re-ed (41750)    Re-Eval (49659)     Manual (65058) 24 2  Estim Unattended (32872)     Ther. Act (12354)    [facility] (71304)     Gait (23669)    Dry Needle 1-2 muscle (77457)     Aquatic Therex (16792)    Dry Needle 3+ muscle (20561)     Iontophoresis (05245)    VASO (40208)     Ultrasound (51340)    Group Therapy (81278)     Estim Attended (41032)    Canalith Repositioning (44597)     Other:    Other:    Total Timed Code Tx

## 2024-10-15 ENCOUNTER — HOSPITAL ENCOUNTER (OUTPATIENT)
Dept: PHYSICAL THERAPY | Age: 69
Setting detail: THERAPIES SERIES
Discharge: HOME | End: 2024-10-15
Payer: MEDICARE

## 2024-10-15 PROCEDURE — 97140 MANUAL THERAPY 1/> REGIONS: CPT

## 2024-10-15 PROCEDURE — 97110 THERAPEUTIC EXERCISES: CPT

## 2024-10-15 NOTE — FLOWSHEET NOTE
[facility] - Outpatient Rehabilitation and Therapy [address] [place] [address] office: [phone] fax: [phone]  Physical Therapy: TREATMENT/PROGRESS NOTE   Patient: Shakeel Salgado (69 y.o. male)   Treatment Date: 10/15/2024   :  1955 MRN: [id number]   Visit #: 37   Insurance Allowable Auth Needed   [place] []Yes    [x]No    Insurance: Payor: MEDICARE / Plan: MEDICARE PART A AND B / Product Type: *No Product type* /   Insurance ID: [id number] - (Medicare)  Secondary Insurance (if applicable): [facility]   Treatment Diagnosis: : low back pain M54.5, DDD facet arthropathy M46.96      Medical Diagnosis:    Acute bilateral low back pain [M54.50]   Referring Physician: Edi Quintero, *  PCP: Mingo Jimenez MD                             Plan of care signed (Y/N):     Date of Patient follow up with Physician:      Progress Report/POC: NO  POC update due: (10 visits /OR AUTH LIMITS, whichever is less)  2024       Preferred Language for Healthcare:   [x]English       []other:    SUBJECTIVE EXAMINATION     Patient Report/Comments:  Pt reports that he had injection at [facility] on Friday and this has been very helpful. States that he has had a migraine since his injection , but this isn't unexpected as it has happened before. Notes that he feels better than he did after his last block/ablation. Notes that he was walking dogs and one was pulling quite a bit- states that he normally would have pain after this- notes that he had a little soreness, but didn't last long.      Test used Initial score  1/10/24 10/15/2024   Pain Summary VAS 4 2/10   Functional questionnaire Modified Oswestry 26% 14% disability   Other:                OBJECTIVE EXAMINATION     Observation:     Test measurements:   ROM/Strength: (Blank cells denote NT)        Mvmt (norm) AROM L AROM R Notes PROM L PROM R Notes                        LUMBAR Flex (90) Knees-45            Ext (25) 25        Patient/Caregiver provided printed discharge information.

## 2024-10-22 ENCOUNTER — HOSPITAL ENCOUNTER (OUTPATIENT)
Dept: PHYSICAL THERAPY | Age: 69
Setting detail: THERAPIES SERIES
Discharge: HOME | End: 2024-10-22
Payer: MEDICARE

## 2024-10-22 PROCEDURE — 97110 THERAPEUTIC EXERCISES: CPT

## 2024-10-22 PROCEDURE — 97140 MANUAL THERAPY 1/> REGIONS: CPT

## 2024-10-22 NOTE — FLOWSHEET NOTE
Minutes 43        Charge Justification:  (27603) THERAPEUTIC EXERCISE - Provided verbal/tactile cueing for activities related to strengthening, flexibility, endurance, ROM performed to prevent loss of range of motion, maintain or improve muscular strength or increase flexibility, following either an injury or surgery.   (73992) HOME EXERCISE PROGRAM - Reviewed/Progressed HEP activities related to strengthening, flexibility, endurance, ROM performed to prevent loss of range of motion, maintain or improve muscular strength or increase flexibility, following either an injury or surgery.  (25031) NEUROMUSCULAR RE-EDUCATION - Therapeutic procedure, 1 or more areas, each 15 minutes; neuromuscular reeducation of movement, balance, coordination, kinesthetic sense, posture, and/or proprioception for sitting and/or standing activities  (50026) THERAPEUTIC ACTIVITY - use of dynamic activities to improve functional performance. (Ex include squatting, ascending/descending stairs, walking, bending, lifting, catching, throwing, pushing, pulling, jumping.)  Direct, one on one contact, billed in 15-minute increments.  (52622) MANUAL THERAPY -  Manual therapy techniques, 1 or more regions, each 15 minutes (Mobilization/manipulation, manual lymphatic drainage, manual traction) for the purpose of modulating pain, promoting relaxation,  increasing ROM, reducing/eliminating soft tissue swelling/inflammation/restriction, improving soft tissue extensibility and allowing for proper ROM for normal function with self care, mobility, lifting and ambulation    TREATMENT PLAN   Plan:  progress back in to strength and mobility as tolerated.continue 1x/week x 8 weeks     Electronically Signed by Janene Schmidt PT              Date: 10/22/2024     Note: If patient does not return for scheduled/recommended follow up visits, this note will serve as a discharge from care along with the most recent update on progress.

## 2024-10-29 ENCOUNTER — HOSPITAL ENCOUNTER (OUTPATIENT)
Dept: PHYSICAL THERAPY | Age: 69
Setting detail: THERAPIES SERIES
Discharge: HOME | End: 2024-10-29
Payer: MEDICARE

## 2024-10-29 PROCEDURE — 97140 MANUAL THERAPY 1/> REGIONS: CPT

## 2024-10-29 PROCEDURE — 97110 THERAPEUTIC EXERCISES: CPT

## 2024-10-29 NOTE — FLOWSHEET NOTE
[facility] - Outpatient Rehabilitation and Therapy [address] [place] [address] office: [phone] fax: [phone]  Physical Therapy: TREATMENT/PROGRESS NOTE   Patient: Shakeel Salgado (69 y.o. male)   Treatment Date: 10/29/2024   :  1955 MRN: [id number]   Visit #: 39   Insurance Allowable Auth Needed   [place] []Yes    [x]No    Insurance: Payor: MEDICARE / Plan: MEDICARE PART A AND B / Product Type: *No Product type* /   Insurance ID: [id number] - (Medicare)  Secondary Insurance (if applicable): [facility]   Treatment Diagnosis: : low back pain M54.5, DDD facet arthropathy M46.96      Medical Diagnosis:    Acute bilateral low back pain [M54.50]   Referring Physician: Edi Quintero, *  PCP: Mingo Jimenez MD                             Plan of care signed (Y/N):     Date of Patient follow up with Physician:      Progress Report/POC: NO  POC update due: (10 visits /OR AUTH LIMITS, whichever is less)  2024       Preferred Language for Healthcare:   [x]English       []other:    SUBJECTIVE EXAMINATION     Patient Report/Comments:  Pt reports that he felt wonderful after last session. Notes that he still has some discomfort in his back when walking the dogs- but less irritation when dog is on retractable leash.       Test used Initial score  1/10/24 10/29/2024   Pain Summary VAS 4 210   Functional questionnaire Modified Oswestry 26% 14% disability   Other:                OBJECTIVE EXAMINATION     Observation:     Test measurements:   ROM/Strength: (Blank cells denote NT)        Mvmt (norm) AROM L AROM R Notes PROM L PROM R Notes                        LUMBAR Flex (90) Knees-45            Ext (25) 25            SB (25) 15 12            Rotation (30)                                       HIP Flex (120) 111 110            Abd (45)                ER (50) 55 58            IR (45) 8 16            Ext (20)                                           MMT L MMT

## 2024-11-05 ENCOUNTER — HOSPITAL ENCOUNTER (OUTPATIENT)
Dept: PHYSICAL THERAPY | Age: 69
Setting detail: THERAPIES SERIES
Discharge: HOME | End: 2024-11-05
Payer: MEDICARE

## 2024-11-05 PROCEDURE — 97140 MANUAL THERAPY 1/> REGIONS: CPT

## 2024-11-05 PROCEDURE — 97110 THERAPEUTIC EXERCISES: CPT

## 2024-11-05 NOTE — FLOWSHEET NOTE
Minutes 44 3       Total Treatment Minutes 44        Charge Justification:  (37186) THERAPEUTIC EXERCISE - Provided verbal/tactile cueing for activities related to strengthening, flexibility, endurance, ROM performed to prevent loss of range of motion, maintain or improve muscular strength or increase flexibility, following either an injury or surgery.   (99224) HOME EXERCISE PROGRAM - Reviewed/Progressed HEP activities related to strengthening, flexibility, endurance, ROM performed to prevent loss of range of motion, maintain or improve muscular strength or increase flexibility, following either an injury or surgery.  (41399) NEUROMUSCULAR RE-EDUCATION - Therapeutic procedure, 1 or more areas, each 15 minutes; neuromuscular reeducation of movement, balance, coordination, kinesthetic sense, posture, and/or proprioception for sitting and/or standing activities  (98116) THERAPEUTIC ACTIVITY - use of dynamic activities to improve functional performance. (Ex include squatting, ascending/descending stairs, walking, bending, lifting, catching, throwing, pushing, pulling, jumping.)  Direct, one on one contact, billed in 15-minute increments.  (62440) MANUAL THERAPY -  Manual therapy techniques, 1 or more regions, each 15 minutes (Mobilization/manipulation, manual lymphatic drainage, manual traction) for the purpose of modulating pain, promoting relaxation,  increasing ROM, reducing/eliminating soft tissue swelling/inflammation/restriction, improving soft tissue extensibility and allowing for proper ROM for normal function with self care, mobility, lifting and ambulation    TREATMENT PLAN   Plan:  progress back in to strength and mobility as tolerated.continue 1x/week x 8 weeks     Electronically Signed by Janene Schmidt PT              Date: 11/05/2024     Note: If patient does not return for scheduled/recommended follow up visits, this note will serve as a discharge from care along with the most recent update on progress.

## 2024-11-11 ENCOUNTER — HOSPITAL ENCOUNTER (OUTPATIENT)
Dept: PHYSICAL THERAPY | Age: 69
Setting detail: THERAPIES SERIES
Discharge: HOME | End: 2024-11-11
Payer: MEDICARE

## 2024-11-11 PROCEDURE — 97140 MANUAL THERAPY 1/> REGIONS: CPT

## 2024-11-11 PROCEDURE — 97110 THERAPEUTIC EXERCISES: CPT

## 2024-11-11 NOTE — PLAN OF CARE
[facility] - Outpatient Rehabilitation and Therapy [address] [place] [address] office: [phone] fax: [phone]          Physical Therapy Re-Certification Plan of Care/MD UPDATE      Dear Dr Quintero ,    We had the pleasure of treating the following patient for physical therapy services at [facility].  A summary of our findings can be found in the updated assessment below.  This includes our plan of care.  If you have any questions or concerns regarding these findings, please do not hesitate to contact me at the office phone number checked above.  Thank you for the referral.     Physician Signature:________________________________Date:__________________  By signing above (or electronic signature), therapist’s plan is approved by physician      Current Functional Status:   Shakeel Salgado 1955 continues to present with functional deficits in ROM, strength symmetry, and endurance of strength  limiting ability with  walking dogs  .  During therapy this date, patient required progression of exercises and program and manual interventions for exercise progression, improving proper muscle recruitment and activation/motor control patterns, and increasing ROM. Patient will continue to benefit from ongoing evaluation and advanced clinical decision from a Physical Therapist to improve ROM, muscle strength, neuromuscular control, endurance, and balance and proprioception to safely return to PLOF without symptoms or restrictions.    Overall Response to Treatment:   [x]Patient is responding well to treatment and improvement is noted with regards to goals   []Patient should continue to improve in reasonable time if they continue HEP   []Patient has plateaued and is no longer responding to skilled PT intervention    []Patient is getting worse and would benefit from return to referring MD   []Patient unable to adhere to initial POC   []Other:       Total Visits: 41

## 2024-11-19 ENCOUNTER — HOSPITAL ENCOUNTER (OUTPATIENT)
Dept: PHYSICAL THERAPY | Age: 69
Setting detail: THERAPIES SERIES
Discharge: HOME | End: 2024-11-19
Payer: MEDICARE

## 2024-11-19 PROCEDURE — 97140 MANUAL THERAPY 1/> REGIONS: CPT

## 2024-11-19 PROCEDURE — 97110 THERAPEUTIC EXERCISES: CPT

## 2024-11-19 NOTE — FLOWSHEET NOTE
1-2 muscle (12832)     Aquatic Therex (66067)    Dry Needle 3+ muscle (20561)     Iontophoresis (66780)    VASO (58366)     Ultrasound (18661)    Group Therapy (42193)     Estim Attended (96899)    Canalith Repositioning (70982)     Other:    Other:    Total Timed Code Tx Minutes 40 3       Total Treatment Minutes 40        Charge Justification:  (27984) THERAPEUTIC EXERCISE - Provided verbal/tactile cueing for activities related to strengthening, flexibility, endurance, ROM performed to prevent loss of range of motion, maintain or improve muscular strength or increase flexibility, following either an injury or surgery.   (87301) HOME EXERCISE PROGRAM - Reviewed/Progressed HEP activities related to strengthening, flexibility, endurance, ROM performed to prevent loss of range of motion, maintain or improve muscular strength or increase flexibility, following either an injury or surgery.  (83377) NEUROMUSCULAR RE-EDUCATION - Therapeutic procedure, 1 or more areas, each 15 minutes; neuromuscular reeducation of movement, balance, coordination, kinesthetic sense, posture, and/or proprioception for sitting and/or standing activities  (56978) THERAPEUTIC ACTIVITY - use of dynamic activities to improve functional performance. (Ex include squatting, ascending/descending stairs, walking, bending, lifting, catching, throwing, pushing, pulling, jumping.)  Direct, one on one contact, billed in 15-minute increments.  (99742) MANUAL THERAPY -  Manual therapy techniques, 1 or more regions, each 15 minutes (Mobilization/manipulation, manual lymphatic drainage, manual traction) for the purpose of modulating pain, promoting relaxation,  increasing ROM, reducing/eliminating soft tissue swelling/inflammation/restriction, improving soft tissue extensibility and allowing for proper ROM for normal function with self care, mobility, lifting and ambulation    TREATMENT PLAN   Plan:  progress back in to strength and mobility as

## 2024-11-26 ENCOUNTER — HOSPITAL ENCOUNTER (OUTPATIENT)
Dept: PHYSICAL THERAPY | Age: 69
Setting detail: THERAPIES SERIES
Discharge: HOME | End: 2024-11-26
Payer: MEDICARE

## 2024-11-26 PROCEDURE — 97110 THERAPEUTIC EXERCISES: CPT

## 2024-11-26 PROCEDURE — 97140 MANUAL THERAPY 1/> REGIONS: CPT

## 2024-11-26 NOTE — FLOWSHEET NOTE
address the deficits outlined in the patients goals  The patient has a musculoskeletal condition(s) with a corresponding ICD-10 code that is of complexity and severity that require skilled therapeutic intervention. This has a direct and significant impact on the need for therapy and significantly impacts the rate of recovery.   The patient has a complexity identified by an ICD-10 code that has a direct and significant impact on the need for therapy.  (Significantly impacts the rate of recovery and is associated with a primary condition.)   The patient requires ongoing skilled intervention in order to prevent decline of function and worsening of symptoms. Therapy will be administered sparingly.    Treatment/Activity Tolerance:  [x] Patient tolerated treatment well [] Patient limited by fatique  [] Patient limited by pain  [] Patient limited by other medical complications  [] Other:     Return to Play: NA    Prognosis for POC: [x] Good [] Fair  [] Poor    Patient requires continued skilled intervention: [x] Yes  [] No      GOALS       Patient stated goal: ease pain, strengthen back  Status: [x] Progressing: [] Met: [] Not Met: [] Adjusted    Therapist goals for Patient:   Short Term Goals: To be achieved in: 2 weeks  Independent in HEP and progression per patient tolerance, in order to progress toward full function and prevent re-injury.    Status: [] Progressing: [x] Met: [] Not Met: [] Adjusted  Patient will have a decrease in pain to no greater than 1- 2/10 to help  facilitate improvement in movement, function, and ADLs as indicated by functional deficits.   Status: [] Progressing: [x] Met: [] Not Met: [] Adjusted    Long Term Goals: To be achieved in: 8 weeks  Disability index score of 15% or less for the Modified Oswestry to assist with return top prior level of function.    Status: [] Progressing: [x] Met: [] Not Met: [] Adjusted  Improve  lumbar AROM  Flexion to fingertips to ankle mortise degrees or  better to

## 2024-12-03 ENCOUNTER — HOSPITAL ENCOUNTER (OUTPATIENT)
Dept: PHYSICAL THERAPY | Age: 69
Setting detail: THERAPIES SERIES
Discharge: HOME | End: 2024-12-03
Payer: MEDICARE

## 2024-12-03 PROCEDURE — 97140 MANUAL THERAPY 1/> REGIONS: CPT

## 2024-12-03 PROCEDURE — 97110 THERAPEUTIC EXERCISES: CPT

## 2024-12-03 NOTE — FLOWSHEET NOTE
[facility] - Outpatient Rehabilitation and Therapy [address] [place] [address] office: [phone] fax: [phone]      Physical Therapy: TREATMENT/PROGRESS NOTE   Patient: Shakeel Salgado (69 y.o. male)   Treatment Date: 2024   :  1955 MRN: [id number]   Visit #: 44   Insurance Allowable Auth Needed   [place] []Yes    [x]No    Insurance: Payor: MEDICARE / Plan: MEDICARE PART A AND B / Product Type: *No Product type* /   Insurance ID: [id number] - (Medicare)  Secondary Insurance (if applicable): [facility]   Treatment Diagnosis: : low back pain M54.5, DDD facet arthropathy M46.96      Medical Diagnosis:    Acute bilateral low back pain [M54.50]   Referring Physician: Edi Quintero, *  PCP: Mingo Jimenez MD                             Plan of care signed (Y/N):     Date of Patient follow up with Physician:      Progress Report/POC: YES, Date Range for this report: 2024 to 2024  POC update due: (10 visits /OR AUTH LIMITS, whichever is less)  2025       Preferred Language for Healthcare:   [x]English       []other:    SUBJECTIVE EXAMINATION     Patient Report/Comments:  Pt reports that his back has been feeling relatively good. Has been using a brace with walking dogs and when moving furniture and vacuuming which has been helpful. Has been feeling more in his glutes with them working vs. Feeling in his back.            Test used Initial score  1/10/24 2024   Pain Summary VAS 4 -2/10   Functional questionnaire Modified Oswestry 26% 8% disability   Other:                OBJECTIVE EXAMINATION     Observation:     Test measurements: 2024  ROM/Strength: (Blank cells denote NT)        Mvmt (norm) AROM L AROM R Notes PROM L PROM R Notes                        LUMBAR Flex (90) Mid shin- 55            Ext (25) 25            SB (25) 12 15            Rotation (30)                                       HIP Flex (120) 110 115

## 2024-12-10 ENCOUNTER — HOSPITAL ENCOUNTER (OUTPATIENT)
Dept: PHYSICAL THERAPY | Age: 69
Setting detail: THERAPIES SERIES
Discharge: HOME | End: 2024-12-10
Payer: MEDICARE

## 2024-12-10 PROCEDURE — 97110 THERAPEUTIC EXERCISES: CPT

## 2024-12-10 PROCEDURE — 97140 MANUAL THERAPY 1/> REGIONS: CPT

## 2024-12-10 NOTE — FLOWSHEET NOTE
Canalith Repositioning (54617)     Other:    Other:    Total Timed Code Tx Minutes 39 3       Total Treatment Minutes 39        Charge Justification:  (97604) THERAPEUTIC EXERCISE - Provided verbal/tactile cueing for activities related to strengthening, flexibility, endurance, ROM performed to prevent loss of range of motion, maintain or improve muscular strength or increase flexibility, following either an injury or surgery.   (75586) HOME EXERCISE PROGRAM - Reviewed/Progressed HEP activities related to strengthening, flexibility, endurance, ROM performed to prevent loss of range of motion, maintain or improve muscular strength or increase flexibility, following either an injury or surgery.  (28862) NEUROMUSCULAR RE-EDUCATION - Therapeutic procedure, 1 or more areas, each 15 minutes; neuromuscular reeducation of movement, balance, coordination, kinesthetic sense, posture, and/or proprioception for sitting and/or standing activities  (38494) THERAPEUTIC ACTIVITY - use of dynamic activities to improve functional performance. (Ex include squatting, ascending/descending stairs, walking, bending, lifting, catching, throwing, pushing, pulling, jumping.)  Direct, one on one contact, billed in 15-minute increments.  (70804) MANUAL THERAPY -  Manual therapy techniques, 1 or more regions, each 15 minutes (Mobilization/manipulation, manual lymphatic drainage, manual traction) for the purpose of modulating pain, promoting relaxation,  increasing ROM, reducing/eliminating soft tissue swelling/inflammation/restriction, improving soft tissue extensibility and allowing for proper ROM for normal function with self care, mobility, lifting and ambulation    TREATMENT PLAN   Plan:  progress back in to strength and mobility as tolerated.continue 1x/week x 8 weeks     Electronically Signed by Janene Schmidt PT              Date: 12/10/2024     Note: If patient does not return for scheduled/recommended follow up visits, this note will

## 2024-12-18 ENCOUNTER — APPOINTMENT (OUTPATIENT)
Dept: PHYSICAL THERAPY | Age: 69
End: 2024-12-18
Payer: MEDICARE

## 2025-01-02 ENCOUNTER — HOSPITAL ENCOUNTER (OUTPATIENT)
Dept: PHYSICAL THERAPY | Age: 70
Setting detail: THERAPIES SERIES
Discharge: HOME | End: 2025-01-02
Payer: MEDICARE

## 2025-01-02 PROCEDURE — 97140 MANUAL THERAPY 1/> REGIONS: CPT

## 2025-01-02 PROCEDURE — 97110 THERAPEUTIC EXERCISES: CPT

## 2025-01-02 NOTE — FLOWSHEET NOTE
[facility] - Outpatient Rehabilitation and Therapy [address] [place] [address] office: [phone] fax: [phone]      Physical Therapy: TREATMENT/PROGRESS NOTE   Patient: Shakeel Salgado (69 y.o. male)   Treatment Date: 2025   :  1955 MRN: [id number]   Visit #: 46   Insurance Allowable Auth Needed   Medicare/[employer], [place] []Yes    [x]No    Insurance: Payor: MEDICARE / Plan: MEDICARE PART A AND B / Product Type: *No Product type* /   Insurance ID: [id number] - (Medicare)  Secondary Insurance (if applicable): [facility]   Treatment Diagnosis: : low back pain M54.5, DDD facet arthropathy M46.96      Medical Diagnosis:    Acute bilateral low back pain [M54.50]   Referring Physician: Edi Quintero, *  PCP: Mingo Jimenez MD                             Plan of care signed (Y/N):     Date of Patient follow up with Physician:      Progress Report/POC: YES, Date Range for this report: 2024 to 2024  POC update due: (10 visits /OR AUTH LIMITS, whichever is less)  2025       Preferred Language for Healthcare:   [x]English       []other:    SUBJECTIVE EXAMINATION     Patient Report/Comments:  Pt reports that his back has been feeling really pretty good despite not being seen for about a month. Didn't do much over the past few weeks due to being sick. Would like to follow up again in a month and may be ready to d/c at that time.              Test used Initial score  1/10/24 2025   Pain Summary VAS 4 1-2/10   Functional questionnaire Modified Oswestry 26% 8% disability   Other:                OBJECTIVE EXAMINATION     Observation:     Test measurements: 2024  ROM/Strength: (Blank cells denote NT)        Mvmt (norm) AROM L AROM R Notes PROM L PROM R Notes                        LUMBAR Flex (90) Mid shin- 55            Ext (25) 25            SB (25) 12 15            Rotation (30)                                       HIP Flex (120) 110 115

## 2025-02-05 ENCOUNTER — HOSPITAL ENCOUNTER (OUTPATIENT)
Dept: PHYSICAL THERAPY | Age: 70
Setting detail: THERAPIES SERIES
Discharge: HOME | End: 2025-02-05
Payer: MEDICARE

## 2025-02-05 PROCEDURE — 97140 MANUAL THERAPY 1/> REGIONS: CPT

## 2025-02-05 PROCEDURE — 97110 THERAPEUTIC EXERCISES: CPT

## 2025-02-05 NOTE — PLAN OF CARE
deficits.  Status: [] Progressing: [x] Met: [] Not Met: [] Adjusted  Pt to improve strength to show motor control/activation of proximal hip and TrA/abdominal to facilitate functional mobility and ADLs.   Status: [] Progressing: [x] Met: [] Not Met: [] Adjusted  Patient will return to Usual work, housework or activities, lifting an object from the floor, light home activities, heavy home activities, and sit for length of time without increased symptoms or restriction to work towards return to prior level of function.  Status: [] Progressing: [x] Met: [] Not Met: [] Adjusted  Patient will resume normal work/leisure activities without pain. (Still having discomfort with walking dogs)            Status: [] Progressing: [x] Met: [] Not Met: [] Adjusted      Overall Progression Towards Functional goals/ Treatment Progress Update:  [x] Patient is progressing as expected towards functional goals listed.    [] Progression is slowed due to complexities/Impairments listed.  [] Progression has been slowed due to co-morbidities.  [] Plan just implemented, too soon (<30days) to assess goals progression   [] Goals require adjustment due to lack of progress  [] Patient is not progressing as expected and requires additional follow up with physician  [] Other:     CHARGE CAPTURE     PT CHARGE GRID   CPT Code (TIMED) minutes # CPT Code (UNTIMED) #     Therex (34848)  14 1  EVAL:LOW (24266 - Typically 20 minutes face-to-face)     Neuromusc. Re-ed (23642)    Re-Eval (39518)     Manual (53348) 24 2  Estim Unattended (28426)     Ther. Act (94927)    Mech. Traction (17631)     Gait (32813)    Dry Needle 1-2 muscle (87983)     Aquatic Therex (38221)    Dry Needle 3+ muscle (20561)     Iontophoresis (38927)    VASO (83569)     Ultrasound (29393)    Group Therapy (95962)     Estim Attended (07400)    Canalith Repositioning (93474)     Other:    Other:    Total Timed Code Tx Minutes 38 3       Total Treatment Minutes 38        Charge

## 2025-02-12 ENCOUNTER — HOSPITAL ENCOUNTER (OUTPATIENT)
Dept: PHYSICAL THERAPY | Age: 70
Setting detail: THERAPIES SERIES
Discharge: HOME | End: 2025-02-12
Payer: MEDICARE

## 2025-02-12 PROCEDURE — 97161 PT EVAL LOW COMPLEX 20 MIN: CPT

## 2025-02-12 PROCEDURE — 97140 MANUAL THERAPY 1/> REGIONS: CPT

## 2025-02-12 NOTE — PLAN OF CARE
HEP    Plan: POC initiated as per evaluation    Electronically Signed by Janene Schmidt, PT  Date: 02/12/2025     Note: Portions of this note have been templated and/or copied from initial evaluation, reassessments and prior notes for documentation efficiency.    Note: If patient does not return for scheduled/recommended follow up visits, this note will serve as a discharge from care along with the most recent update on progress.

## 2025-02-20 ENCOUNTER — HOSPITAL ENCOUNTER (OUTPATIENT)
Dept: PHYSICAL THERAPY | Age: 70
Setting detail: THERAPIES SERIES
Discharge: HOME | End: 2025-02-20
Payer: MEDICARE

## 2025-02-20 PROCEDURE — 97110 THERAPEUTIC EXERCISES: CPT

## 2025-02-20 PROCEDURE — 97140 MANUAL THERAPY 1/> REGIONS: CPT

## 2025-02-20 NOTE — FLOWSHEET NOTE
[facility] - Outpatient Rehabilitation and Therapy: [address] [place] [address] office: [phone] fax: [phone]       Physical Therapy: TREATMENT/PROGRESS NOTE   Patient: Shakeel Salgado (69 y.o. male)   Examination Date: 2025   :  1955 MRN: [id number]   Visit #: 2   Insurance Allowable Auth Needed   Medicare []Yes    []No    Insurance: Payor: MEDICARE / Plan: MEDICARE PART A AND B / Product Type: *No Product type* /   Insurance ID: [id number] - (Medicare)  Secondary Insurance (if applicable): [facility]   Treatment Diagnosis: L knee pain M25.562  No diagnosis found.   Medical Diagnosis:  Pain in left knee [M25.562]   Referring Physician: Edi Quintero, *  PCP: Mingo Jimenez MD     Plan of care signed (Y/N):     Date of Patient follow up with Physician:      Plan of Care Report: EVAL today  POC update due: (10 visits /OR AUTH LIMITS, whichever is less)  3/21/2025                                             Medical History:  Comorbidities:  Diabetes (Type I or II)  Hypertension  Osteoarthritis  Anxiety  Relevant Medical History: chronic low back pain, L knee pain                                         Precautions/ Contra-indications:           Latex allergy:  NO  Pacemaker:    NO  Contraindications for Manipulation: None  Date of Surgery: n/a  Other:    Red Flags:  None    Suicide Screening:   The patient did not verbalize a primary behavioral concern, suicidal ideation, suicidal intent, or demonstrate suicidal behaviors.    Preferred Language for Healthcare:   [x] English       [] other:    SUBJECTIVE EXAMINATION     Patient stated complaint: Patient reports knee has been feeling some better. Feeling a bit more in his back with the colder weather.         Test used Initial score  2025   Pain Summary VAS 4    Functional questionnaire LEFS 47/80 (41.25%)    Other:              Pain:  Pain location: lateral knee, patellar tendon  Patient

## 2025-02-20 NOTE — PLAN OF CARE
[facility] - Outpatient Rehabilitation and Therapy: [address] [place] [address] office: [phone] fax: [phone]     Physical Therapy Initial Evaluation Certification      Dear Edi Quintero MD ,    We had the pleasure of evaluating the following patient for physical therapy services at [facility].  A summary of our findings can be found in the initial assessment below.  This includes our plan of care.  If you have any questions or concerns regarding these findings, please do not hesitate to contact me at the office phone number listed above.  Thank you for the referral.     Physician Signature:_______________________________Date:__________________  By signing above (or electronic signature), therapist’s plan is approved by physician       Physical Therapy: TREATMENT/PROGRESS NOTE   Patient: Shakeel Salgado (69 y.o. male)   Examination Date: 2025   :  1955 MRN: [id number]   Visit #: 2   Insurance Allowable Auth Needed   Medicare []Yes    []No    Insurance: Payor: MEDICARE / Plan: MEDICARE PART A AND B / Product Type: *No Product type* /   Insurance ID: [id number] - (Medicare)  Secondary Insurance (if applicable): [facility]   Treatment Diagnosis: L knee pain M25.562  No diagnosis found.   Medical Diagnosis:  Pain in left knee [M25.562]   Referring Physician: Edi Quintero, *  PCP: Mingo Jimenez MD     Plan of care signed (Y/N):     Date of Patient follow up with Physician:      Plan of Care Report: EVAL today  POC update due: (10 visits /OR AUTH LIMITS, whichever is less)  3/21/2025                                             Medical History:  Comorbidities:  Diabetes (Type I or II)  Hypertension  Osteoarthritis  Anxiety  Relevant Medical History: chronic low back pain, L knee pain                                         Precautions/ Contra-indications:           Latex allergy:  NO  Pacemaker:

## 2025-02-27 ENCOUNTER — HOSPITAL ENCOUNTER (OUTPATIENT)
Dept: PHYSICAL THERAPY | Age: 70
Setting detail: THERAPIES SERIES
Discharge: HOME | End: 2025-02-27
Payer: MEDICARE

## 2025-02-27 PROCEDURE — 97110 THERAPEUTIC EXERCISES: CPT

## 2025-02-27 PROCEDURE — 97140 MANUAL THERAPY 1/> REGIONS: CPT

## 2025-02-27 NOTE — FLOWSHEET NOTE
[facility] - Outpatient Rehabilitation and Therapy: [address] [place] [address] office: [phone] fax: [phone]       Physical Therapy: TREATMENT/PROGRESS NOTE   Patient: Shakeel Salgado (69 y.o. male)   Examination Date: 2025   :  1955 MRN: [id number]   Visit #: 3   Insurance Allowable Auth Needed   Medicare []Yes    []No    Insurance: Payor: MEDICARE / Plan: MEDICARE PART A AND B / Product Type: *No Product type* /   Insurance ID: [id number] - (Medicare)  Secondary Insurance (if applicable): [facility]   Treatment Diagnosis: L knee pain M25.562  No diagnosis found.   Medical Diagnosis:  Pain in left knee [M25.562]   Referring Physician: Edi Quintero, *  PCP: Mingo Jimenez MD     Plan of care signed (Y/N):     Date of Patient follow up with Physician:      Plan of Care Report: EVAL today  POC update due: (10 visits /OR AUTH LIMITS, whichever is less)  3/28/2025                                             Medical History:  Comorbidities:  Diabetes (Type I or II)  Hypertension  Osteoarthritis  Anxiety  Relevant Medical History: chronic low back pain, L knee pain                                         Precautions/ Contra-indications:           Latex allergy:  NO  Pacemaker:    NO  Contraindications for Manipulation: None  Date of Surgery: n/a  Other:    Red Flags:  None    Suicide Screening:   The patient did not verbalize a primary behavioral concern, suicidal ideation, suicidal intent, or demonstrate suicidal behaviors.    Preferred Language for Healthcare:   [x] English       [] other:    SUBJECTIVE EXAMINATION     Patient stated complaint: Patient reports that he is feeling pretty sore and achy everywhere. Stressed as he had to put his dog down earlier today. Noting that he is having a harder time with going up the stairs, when he is pushing off of his L leg.         Test used Initial score  2025   Pain Summary VAS 4    Functional

## 2025-03-05 ENCOUNTER — HOSPITAL ENCOUNTER (OUTPATIENT)
Dept: PHYSICAL THERAPY | Age: 70
Setting detail: THERAPIES SERIES
Discharge: HOME | End: 2025-03-05
Payer: MEDICARE

## 2025-03-05 PROCEDURE — 97110 THERAPEUTIC EXERCISES: CPT

## 2025-03-05 PROCEDURE — 97140 MANUAL THERAPY 1/> REGIONS: CPT

## 2025-03-05 NOTE — FLOWSHEET NOTE
[facility] - Outpatient Rehabilitation and Therapy: [address] [place] [address] office: [phone] fax: [phone]       Physical Therapy: TREATMENT/PROGRESS NOTE   Patient: Shakeel Salgado (69 y.o. male)   Examination Date: 2025   :  1955 MRN: [id number]   Visit #: 4   Insurance Allowable Auth Needed   Medicare []Yes    []No    Insurance: Payor: MEDICARE / Plan: MEDICARE PART A AND B / Product Type: *No Product type* /   Insurance ID: [id number] - (Medicare)  Secondary Insurance (if applicable): [facility]   Treatment Diagnosis: L knee pain M25.562  No diagnosis found.   Medical Diagnosis:  Pain in left knee [M25.562]   Referring Physician: Edi Quintero, *  PCP: Mingo Jimenez MD     Plan of care signed (Y/N):     Date of Patient follow up with Physician:      Plan of Care Report: EVAL today  POC update due: (10 visits /OR AUTH LIMITS, whichever is less)  2025                                             Medical History:  Comorbidities:  Diabetes (Type I or II)  Hypertension  Osteoarthritis  Anxiety  Relevant Medical History: chronic low back pain, L knee pain                                         Precautions/ Contra-indications:           Latex allergy:  NO  Pacemaker:    NO  Contraindications for Manipulation: None  Date of Surgery: n/a  Other:    Red Flags:  None    Suicide Screening:   The patient did not verbalize a primary behavioral concern, suicidal ideation, suicidal intent, or demonstrate suicidal behaviors.    Preferred Language for Healthcare:   [x] English       [] other:    SUBJECTIVE EXAMINATION     Patient stated complaint: Patient reports that his knee is still feeling achy all along the base of the knee. Saw doc and he would like him to have ionto for knee. Feeling ok with going down stairs, but having more discomfort with going up the stairs.          Test used Initial score  2025   Pain Summary VAS 4    Functional

## 2025-03-10 ENCOUNTER — HOSPITAL ENCOUNTER (OUTPATIENT)
Dept: PHYSICAL THERAPY | Age: 70
Setting detail: THERAPIES SERIES
Discharge: HOME | End: 2025-03-10
Payer: MEDICARE

## 2025-03-10 PROCEDURE — 97140 MANUAL THERAPY 1/> REGIONS: CPT

## 2025-03-10 PROCEDURE — 97033 APP MDLTY 1+IONTPHRSIS EA 15: CPT

## 2025-03-10 PROCEDURE — 97110 THERAPEUTIC EXERCISES: CPT

## 2025-03-10 NOTE — FLOWSHEET NOTE
[facility] - Outpatient Rehabilitation and Therapy: [address] [place] [address] office: [phone] fax: [phone]       Physical Therapy: TREATMENT/PROGRESS NOTE   Patient: Shakeel Salgado (69 y.o. male)   Examination Date: 03/10/2025   :  1955 MRN: [id number]   Visit #: 5   Insurance Allowable Auth Needed   Medicare []Yes    []No    Insurance: Payor: MEDICARE / Plan: MEDICARE PART A AND B / Product Type: *No Product type* /   Insurance ID: [id number] - (Medicare)  Secondary Insurance (if applicable): [facility]   Treatment Diagnosis: L knee pain M25.562  No diagnosis found.   Medical Diagnosis:  Pain in left knee [M25.562]   Referring Physician: Edi Quintero, *  PCP: Mingo Jimenez MD     Plan of care signed (Y/N):     Date of Patient follow up with Physician:      Plan of Care Report: EVAL today  POC update due: (10 visits /OR AUTH LIMITS, whichever is less)  2025                                             Medical History:  Comorbidities:  Diabetes (Type I or II)  Hypertension  Osteoarthritis  Anxiety  Relevant Medical History: chronic low back pain, L knee pain                                         Precautions/ Contra-indications:           Latex allergy:  NO  Pacemaker:    NO  Contraindications for Manipulation: None  Date of Surgery: n/a  Other:    Red Flags:  None    Suicide Screening:   The patient did not verbalize a primary behavioral concern, suicidal ideation, suicidal intent, or demonstrate suicidal behaviors.    Preferred Language for Healthcare:   [x] English       [] other:    SUBJECTIVE EXAMINATION     Patient stated complaint: Patient reports that his knee is doing better than most of the rest of his body today. Has new order and medication for ionto for his L knee. Was able to walk 1 mile today without his knee brace and felt ok.          Test used Initial score  2/12/25 03/10/2025   Pain Summary VAS 4 2   Functional questionnaire LEFS

## 2025-03-19 ENCOUNTER — HOSPITAL ENCOUNTER (OUTPATIENT)
Dept: PHYSICAL THERAPY | Age: 70
Setting detail: THERAPIES SERIES
Discharge: HOME | End: 2025-03-19
Payer: MEDICARE

## 2025-03-19 PROCEDURE — 97033 APP MDLTY 1+IONTPHRSIS EA 15: CPT

## 2025-03-19 PROCEDURE — 97140 MANUAL THERAPY 1/> REGIONS: CPT

## 2025-03-19 PROCEDURE — 97110 THERAPEUTIC EXERCISES: CPT

## 2025-03-19 NOTE — FLOWSHEET NOTE
Other:    Other:    Total Timed Code Tx Minutes 38 3       Total Treatment Minutes 38        Charge Justification:  (46598) THERAPEUTIC EXERCISE - Provided verbal/tactile cueing for HEP and/or activities related to strengthening, flexibility, endurance, ROM performed to prevent loss of range of motion, maintain or improve muscular strength or increase flexibility, following either an injury or surgery.   (36009) MANUAL THERAPY -  Manual therapy techniques, 1 or more regions, each 15 minutes (Mobilization/manipulation, manual lymphatic drainage, manual traction) for the purpose of modulating pain, promoting relaxation,  increasing ROM, reducing/eliminating soft tissue swelling/inflammation/restriction, improving soft tissue extensibility and allowing for proper ROM for normal function with self care, mobility, lifting and ambulation    GOALS     Patient stated goal: decrease pain, walk/sit without pain  [] Progressing: [] Met: [] Not Met: [] Adjusted    Therapist goals for Patient:   Short Term Goals: To be achieved in: 2 weeks  1Independent in HEP and progression per patient tolerance, in order to prevent re-injury.   [] Progressing: [] Met: [] Not Met: [] Adjusted  Patient will have a decrease in pain to <0-1/10 to facilitate improvement in movement, function, and ADLs as indicated by Functional Deficits.  [] Progressing: [] Met: [] Not Met: [] Adjusted      Long Term Goals: To be achieved in: 8 weeks  Disability index score of 35% or less for the LEFS to assist with reaching prior level of function with activities such as walking and sitting.  [] Progressing: [] Met: [] Not Met: [] Adjusted  Patient will demonstrate increased AROM of L hip IR to 15-20 degrees without pain to allow for proper joint functioning to enable patient to decrease load to knee.   [] Progressing: [] Met: [] Not Met: [] Adjusted  Patient will demonstrate increased Strength of L hip and knee to within 5lb with HHD of contralateral limb to

## 2025-03-25 ENCOUNTER — HOSPITAL ENCOUNTER (OUTPATIENT)
Dept: PHYSICAL THERAPY | Age: 70
Setting detail: THERAPIES SERIES
Discharge: HOME | End: 2025-03-25
Payer: MEDICARE

## 2025-03-25 PROCEDURE — 97140 MANUAL THERAPY 1/> REGIONS: CPT

## 2025-03-25 PROCEDURE — 97033 APP MDLTY 1+IONTPHRSIS EA 15: CPT

## 2025-03-25 PROCEDURE — 97110 THERAPEUTIC EXERCISES: CPT

## 2025-03-25 NOTE — FLOWSHEET NOTE
[facility] - Outpatient Rehabilitation and Therapy: [address] [place] [address] office: [phone] fax: [phone]       Physical Therapy: TREATMENT/PROGRESS NOTE   Patient: Shakeel Salgado (69 y.o. male)   Examination Date: 2025   :  1955 MRN: [id number]   Visit #: 7   Insurance Allowable Auth Needed   Medicare []Yes    []No    Insurance: Payor: MEDICARE / Plan: MEDICARE PART A AND B / Product Type: *No Product type* /   Insurance ID: [id number] - (Medicare)  Secondary Insurance (if applicable): [facility]   Treatment Diagnosis: L knee pain M25.562  No diagnosis found.   Medical Diagnosis:  Pain in left knee [M25.562]   Referring Physician: Edi Quintero, *  PCP: Mingo Jimenez MD     Plan of care signed (Y/N):     Date of Patient follow up with Physician:      Plan of Care Report: NO  POC update due: (10 visits /OR AUTH LIMITS, whichever is less)  2025                                             Medical History:  Comorbidities:  Diabetes (Type I or II)  Hypertension  Osteoarthritis  Anxiety  Relevant Medical History: chronic low back pain, L knee pain                                         Precautions/ Contra-indications:           Latex allergy:  NO  Pacemaker:    NO  Contraindications for Manipulation: None  Date of Surgery: n/a  Other:    Red Flags:  None    Suicide Screening:   The patient did not verbalize a primary behavioral concern, suicidal ideation, suicidal intent, or demonstrate suicidal behaviors.    Preferred Language for Healthcare:   [x] English       [] other:    SUBJECTIVE EXAMINATION     Patient stated complaint: Patient reports that his knee felt better for a longer duration with ionto compared to last session. Feeling tight in his back again today as he walked 5K steps yesterday. Had to do steps yesterday to basement, knee felt like a little bit of buckling in knee- only felt this one time.             Test used Initial

## 2025-04-01 ENCOUNTER — HOSPITAL ENCOUNTER (OUTPATIENT)
Dept: PHYSICAL THERAPY | Age: 70
Setting detail: THERAPIES SERIES
Discharge: HOME | End: 2025-04-01
Payer: MEDICARE

## 2025-04-01 PROCEDURE — 97110 THERAPEUTIC EXERCISES: CPT

## 2025-04-01 PROCEDURE — 97033 APP MDLTY 1+IONTPHRSIS EA 15: CPT

## 2025-04-01 PROCEDURE — 97140 MANUAL THERAPY 1/> REGIONS: CPT

## 2025-04-09 ENCOUNTER — HOSPITAL ENCOUNTER (OUTPATIENT)
Dept: PHYSICAL THERAPY | Age: 70
Setting detail: THERAPIES SERIES
Discharge: HOME | End: 2025-04-09
Payer: MEDICARE

## 2025-04-09 PROCEDURE — 97110 THERAPEUTIC EXERCISES: CPT

## 2025-04-09 PROCEDURE — 97033 APP MDLTY 1+IONTPHRSIS EA 15: CPT

## 2025-04-09 PROCEDURE — 97140 MANUAL THERAPY 1/> REGIONS: CPT

## 2025-04-09 NOTE — FLOWSHEET NOTE
Abnormal findings listed below  All WNL    Myotomes: Abnormal findings listed below  All WNL    Reflexes: Abnormal findings listed below  All reflexes WNL (2+)    Specific Joint Mobility Testing/Accessory Motions:      Knee: WNL on L     Special Tests:  N/A    Gait:    Pattern: WNL  Assistive Device Used: no AD    Balance:  [x] WNL      [] NT       [] Dysfunction noted  Comment:     Falls Risk Assessment (30 days):   Falls Risk assessed and no intervention required.  Time Up and Go (TUG):   Not Assessed        Exercises/Interventions     Therapeutic Ex (64571) 16 resistance Sets/time Reps Notes/Cues/Progressions   Adductor stretch- with stretch strap and seated at EOT  hep     Prone isolated glute + quad activation R 10 10    Prone frogger bilat 10 10    Prone quad stretch R 30 2    SLR 3 lb 0     SLR with adductor/VMO involvement 3 lb 0     BOSU lunge- fwd  0     Wall sit with adductor squeeze  3sec holds 10 x 3 set    Slide lunge bilat 5 10    Leg press- 70 lb - 2 to 1 eccentrics  0     Step up 8 in  L 0     TRX modified eccentric lowering to chair and push up from chair L 0     TM hip extension R 2 10                  Manual Intervention (56945) 22  TIME     STM hip adductors, medial hamstring, IT band  0     Prone hip ER and extension mobs R 4     LAD and belt mobs L 6     STM lumbar spine  8     PA lumbar spine  4            NMR re-education (66340) resistance Sets/time Reps CUES NEEDED                                      Therapeutic Activity (69083)  Sets/time                                 IONTO  5'       Modalities:    No modalities applied this session    Education/Home Exercise Program: HEP discussed and performed, see exercise grid      ASSESSMENT       Today's Assessment: During therapy this date, patient required progression of exercises and program and manual interventions for exercise progression, improving proper muscle recruitment and activation/motor control patterns, and improving soft tissue

## 2025-04-16 ENCOUNTER — HOSPITAL ENCOUNTER (OUTPATIENT)
Dept: PHYSICAL THERAPY | Age: 70
Setting detail: THERAPIES SERIES
Discharge: HOME | End: 2025-04-16
Payer: MEDICARE

## 2025-04-16 PROCEDURE — 97110 THERAPEUTIC EXERCISES: CPT

## 2025-04-16 PROCEDURE — 97140 MANUAL THERAPY 1/> REGIONS: CPT

## 2025-04-16 NOTE — PLAN OF CARE
activities related to strengthening, flexibility, endurance, ROM performed to prevent loss of range of motion, maintain or improve muscular strength or increase flexibility, following either an injury or surgery.   (83407) MANUAL THERAPY -  Manual therapy techniques, 1 or more regions, each 15 minutes (Mobilization/manipulation, manual lymphatic drainage, manual traction) for the purpose of modulating pain, promoting relaxation,  increasing ROM, reducing/eliminating soft tissue swelling/inflammation/restriction, improving soft tissue extensibility and allowing for proper ROM for normal function with self care, mobility, lifting and ambulation    GOALS     Patient stated goal: decrease pain, walk/sit without pain  [x] Progressing: [] Met: [] Not Met: [] Adjusted    Therapist goals for Patient:   Short Term Goals: To be achieved in: 2 weeks  1Independent in HEP and progression per patient tolerance, in order to prevent re-injury.   [] Progressing: [x] Met: [] Not Met: [] Adjusted  Patient will have a decrease in pain to <0-1/10 to facilitate improvement in movement, function, and ADLs as indicated by Functional Deficits.  [] Progressing: [x] Met: [] Not Met: [] Adjusted      Long Term Goals: To be achieved in: 8 weeks  Disability index score of 35% or less for the LEFS to assist with reaching prior level of function with activities such as walking and sitting.  [] Progressing: [x] Met: [] Not Met: [] Adjusted  Patient will demonstrate increased AROM of L hip IR to 15-20 degrees without pain to allow for proper joint functioning to enable patient to decrease load to knee.   [] Progressing: [x] Met: [] Not Met: [] Adjusted  Patient will demonstrate increased Strength of L hip and knee to within 5lb with HHD of contralateral limb to allow for proper functional mobility to enable patient to return to walking and management of stairs.   [x] Progressing: [] Met: [] Not Met: [] Adjusted  Patient will return to sitting for at

## 2025-04-24 ENCOUNTER — HOSPITAL ENCOUNTER (OUTPATIENT)
Dept: PHYSICAL THERAPY | Age: 70
Setting detail: THERAPIES SERIES
Discharge: HOME | End: 2025-04-24
Payer: MEDICARE

## 2025-04-24 PROCEDURE — 97140 MANUAL THERAPY 1/> REGIONS: CPT

## 2025-04-24 PROCEDURE — 97110 THERAPEUTIC EXERCISES: CPT

## 2025-04-24 PROCEDURE — 97033 APP MDLTY 1+IONTPHRSIS EA 15: CPT

## 2025-04-24 NOTE — FLOWSHEET NOTE
[facility] - Outpatient Rehabilitation and Therapy: [address] [place] [address] office: [phone] fax: [phone]          Physical Therapy: TREATMENT/PROGRESS NOTE   Patient: Shakeel Salgado (70 y.o. male)   Examination Date: 2025   :  1955 MRN: [id number]   Visit #: 11   Insurance Allowable Auth Needed   Medicare []Yes    []No    Insurance: Payor: MEDICARE / Plan: MEDICARE PART A AND B / Product Type: *No Product type* /   Insurance ID: [id number] - (Medicare)  Secondary Insurance (if applicable): [facility]   Treatment Diagnosis: L knee pain M25.562  No diagnosis found.   Medical Diagnosis:  Pain in left knee [M25.562]   Referring Physician: Edi Quintero, *  PCP: Mingo Jimenez MD     Plan of care signed (Y/N):     Date of Patient follow up with Physician:      Plan of Care Report: YES, Date Range for this report: 2025 to 2025  POC update due: (10 visits /OR AUTH LIMITS, whichever is less)  2025                                             Medical History:  Comorbidities:  Diabetes (Type I or II)  Hypertension  Osteoarthritis  Anxiety  Relevant Medical History: chronic low back pain, L knee pain                                         Precautions/ Contra-indications:           Latex allergy:  NO  Pacemaker:    NO  Contraindications for Manipulation: None  Date of Surgery: n/a  Other:    Red Flags:  None    Suicide Screening:   The patient did not verbalize a primary behavioral concern, suicidal ideation, suicidal intent, or demonstrate suicidal behaviors.    Preferred Language for Healthcare:   [x] English       [] other:    SUBJECTIVE EXAMINATION     Patient stated complaint: Patient reports that he had his SI injection on Friday and is feeling much better overall- no pain in back- just a little sore. Does report that his L knee felt like it was going to give out on him on Thursday last week. Is feeling better now.               Test

## 2025-05-01 ENCOUNTER — HOSPITAL ENCOUNTER (OUTPATIENT)
Dept: PHYSICAL THERAPY | Age: 70
Setting detail: THERAPIES SERIES
Discharge: HOME | End: 2025-05-01
Payer: MEDICARE

## 2025-05-01 PROCEDURE — 97110 THERAPEUTIC EXERCISES: CPT

## 2025-05-01 PROCEDURE — 97140 MANUAL THERAPY 1/> REGIONS: CPT

## 2025-05-01 PROCEDURE — 97033 APP MDLTY 1+IONTPHRSIS EA 15: CPT

## 2025-05-06 ENCOUNTER — HOSPITAL ENCOUNTER (OUTPATIENT)
Dept: PHYSICAL THERAPY | Age: 70
Setting detail: THERAPIES SERIES
Discharge: HOME | End: 2025-05-06
Payer: MEDICARE

## 2025-05-06 PROCEDURE — 97110 THERAPEUTIC EXERCISES: CPT

## 2025-05-06 PROCEDURE — 97140 MANUAL THERAPY 1/> REGIONS: CPT

## 2025-05-06 NOTE — FLOWSHEET NOTE
[facility] - Outpatient Rehabilitation and Therapy: [address] [place] [address] office: [phone] fax: [phone]          Physical Therapy: TREATMENT/PROGRESS NOTE   Patient: Shakeel Salgado (70 y.o. male)   Examination Date: 2025   :  1955 MRN: [id number]   Visit #: 13   Insurance Allowable Auth Needed   Medicare []Yes    []No    Insurance: Payor: MEDICARE / Plan: MEDICARE PART A AND B / Product Type: *No Product type* /   Insurance ID: [id number] - (Medicare)  Secondary Insurance (if applicable): [facility]   Treatment Diagnosis: L knee pain M25.562  No diagnosis found.   Medical Diagnosis:  Pain in left knee [M25.562]   Referring Physician: Edi Quintero, *  PCP: Mingo Jimenez MD     Plan of care signed (Y/N):     Date of Patient follow up with Physician:      Plan of Care Report: YES, Date Range for this report: 2025 to 2025  POC update due: (10 visits /OR AUTH LIMITS, whichever is less)  2025                                             Medical History:  Comorbidities:  Diabetes (Type I or II)  Hypertension  Osteoarthritis  Anxiety  Relevant Medical History: chronic low back pain, L knee pain                                         Precautions/ Contra-indications:           Latex allergy:  NO  Pacemaker:    NO  Contraindications for Manipulation: None  Date of Surgery: n/a  Other:    Red Flags:  None    Suicide Screening:   The patient did not verbalize a primary behavioral concern, suicidal ideation, suicidal intent, or demonstrate suicidal behaviors.    Preferred Language for Healthcare:   [x] English       [] other:    SUBJECTIVE EXAMINATION     Patient stated complaint: Patient reports that his back continues to do well. Knee has been feeling pretty good as well. Was able to walk about a mile yesterday without any issue.                Test used Initial score  2025   Pain Summary VAS 4 1   Functional questionnaire

## 2025-05-20 ENCOUNTER — HOSPITAL ENCOUNTER (OUTPATIENT)
Dept: PHYSICAL THERAPY | Age: 70
Setting detail: THERAPIES SERIES
Discharge: HOME | End: 2025-05-20
Payer: MEDICARE

## 2025-05-20 PROCEDURE — 97140 MANUAL THERAPY 1/> REGIONS: CPT

## 2025-05-20 PROCEDURE — 97110 THERAPEUTIC EXERCISES: CPT

## 2025-05-20 NOTE — FLOWSHEET NOTE
patient has a musculoskeletal condition(s) with a corresponding ICD-10 code that is of complexity and severity that require skilled therapeutic intervention. This has a direct and significant impact on the need for therapy and significantly impacts the rate of recovery.   The patient had a prior episode of outpatient therapy during this calendar year for a different condition.  Current diagnosis requires skilled therapeutic intervention.    Return to Play: NA    Prognosis for POC: [x] Good [] Fair  [] Poor    Patient requires continued skilled intervention: [x] Yes  [] No      CHARGE CAPTURE     PT CHARGE GRID   CPT Code (TIMED) minutes # CPT Code (UNTIMED) #     Therex (09760)  30 2  EVAL:LOW (43869 - Typically 20 minutes face-to-face)     Neuromusc. Re-ed (21298)    Re-Eval (69281)     Manual (45388) 8 1  Estim Unattended (91629)     Ther. Act (04291)    Mech. Traction (15090)     Gait (63086)    Dry Needle 1-2 muscle (78341)     Aquatic Therex (72228)    Dry Needle 3+ muscle (20561)     Iontophoresis (64003)    VASO (01499)     Ultrasound (77364)    Group Therapy (61042)     Estim Attended (39176)    Canalith Repositioning (53642)     Physical Performance Test (49814)    Custom orthotic ()     Other:    Other:    Total Timed Code Tx Minutes 38 3       Total Treatment Minutes 38        Charge Justification:  (48701) THERAPEUTIC EXERCISE - Provided verbal/tactile cueing for HEP and/or activities related to strengthening, flexibility, endurance, ROM performed to prevent loss of range of motion, maintain or improve muscular strength or increase flexibility, following either an injury or surgery.   (49548) MANUAL THERAPY -  Manual therapy techniques, 1 or more regions, each 15 minutes (Mobilization/manipulation, manual lymphatic drainage, manual traction) for the purpose of modulating pain, promoting relaxation,  increasing ROM, reducing/eliminating soft tissue swelling/inflammation/restriction, improving soft

## 2025-05-27 ENCOUNTER — HOSPITAL ENCOUNTER (OUTPATIENT)
Dept: PHYSICAL THERAPY | Age: 70
Setting detail: THERAPIES SERIES
Discharge: HOME | End: 2025-05-27
Payer: MEDICARE

## 2025-05-27 PROCEDURE — 97110 THERAPEUTIC EXERCISES: CPT

## 2025-05-27 PROCEDURE — 97140 MANUAL THERAPY 1/> REGIONS: CPT

## 2025-05-27 NOTE — FLOWSHEET NOTE
[facility] - Outpatient Rehabilitation and Therapy: [address] [place] [address] office: [phone] fax: [phone]          Physical Therapy: TREATMENT/PROGRESS NOTE   Patient: Shakeel Salgado (70 y.o. male)   Examination Date: 2025   :  1955 MRN: [id number]   Visit #: 15   Insurance Allowable Auth Needed   Medicare []Yes    []No    Insurance: Payor: MEDICARE / Plan: MEDICARE PART A AND B / Product Type: *No Product type* /   Insurance ID: [id number] - (Medicare)  Secondary Insurance (if applicable): [facility]   Treatment Diagnosis: L knee pain M25.562  No diagnosis found.   Medical Diagnosis:  Pain in left knee [M25.562]   Referring Physician: Edi Quintero, *  PCP: Mingo Jimenez MD     Plan of care signed (Y/N):     Date of Patient follow up with Physician:      Plan of Care Report: YES, Date Range for this report: 2025 to 2025  POC update due: (10 visits /OR AUTH LIMITS, whichever is less)  2025                                             Medical History:  Comorbidities:  Diabetes (Type I or II)  Hypertension  Osteoarthritis  Anxiety  Relevant Medical History: chronic low back pain, L knee pain                                         Precautions/ Contra-indications:           Latex allergy:  NO  Pacemaker:    NO  Contraindications for Manipulation: None  Date of Surgery: n/a  Other:    Red Flags:  None    Suicide Screening:   The patient did not verbalize a primary behavioral concern, suicidal ideation, suicidal intent, or demonstrate suicidal behaviors.    Preferred Language for Healthcare:   [x] English       [] other:    SUBJECTIVE EXAMINATION     Patient stated complaint: Patient reports that his back and knee are both feeling pretty good. Walked about 5k steps yesterday. Glutes are hurting some today due to taking pool cover off over the weekend- but is just a muscle sore.                Test used Initial score  2025

## 2025-06-02 ENCOUNTER — HOSPITAL ENCOUNTER (OUTPATIENT)
Dept: PHYSICAL THERAPY | Age: 70
Setting detail: THERAPIES SERIES
Discharge: HOME | End: 2025-06-02
Payer: MEDICARE

## 2025-06-02 PROCEDURE — 97110 THERAPEUTIC EXERCISES: CPT

## 2025-06-02 PROCEDURE — 97140 MANUAL THERAPY 1/> REGIONS: CPT

## 2025-06-02 NOTE — FLOWSHEET NOTE
[facility] - Outpatient Rehabilitation and Therapy: [address] [place] [address] office: [phone] fax: [phone]          Physical Therapy: TREATMENT/PROGRESS NOTE   Patient: Shakeel Salgado (70 y.o. male)   Examination Date: 2025   :  1955 MRN: [id number]   Visit #: 16   Insurance Allowable Auth Needed   Medicare []Yes    []No    Insurance: Payor: MEDICARE / Plan: MEDICARE PART A AND B / Product Type: *No Product type* /   Insurance ID: [id number] - (Medicare)  Secondary Insurance (if applicable): [facility]   Treatment Diagnosis: L knee pain M25.562  No diagnosis found.   Medical Diagnosis:  Pain in left knee [M25.562]   Referring Physician: Edi Quintero, *  PCP: Mingo Jimenez MD     Plan of care signed (Y/N):     Date of Patient follow up with Physician:      Plan of Care Report: YES, Date Range for this report: 2025 to 2025  POC update due: (10 visits /OR AUTH LIMITS, whichever is less)  2025                                             Medical History:  Comorbidities:  Diabetes (Type I or II)  Hypertension  Osteoarthritis  Anxiety  Relevant Medical History: chronic low back pain, L knee pain                                         Precautions/ Contra-indications:           Latex allergy:  NO  Pacemaker:    NO  Contraindications for Manipulation: None  Date of Surgery: n/a  Other:    Red Flags:  None    Suicide Screening:   The patient did not verbalize a primary behavioral concern, suicidal ideation, suicidal intent, or demonstrate suicidal behaviors.    Preferred Language for Healthcare:   [x] English       [] other:    SUBJECTIVE EXAMINATION     Patient stated complaint: Patient reports that he was just a little muscle sore after last session. Notes that his glutes are still a little sore from opening the pool, but not bad. Overall still feels like he is doing better. Was able to walk a mile this morning.                Test used

## 2025-06-03 ENCOUNTER — APPOINTMENT (OUTPATIENT)
Dept: PHYSICAL THERAPY | Age: 70
End: 2025-06-03
Payer: MEDICARE

## 2025-06-10 ENCOUNTER — HOSPITAL ENCOUNTER (OUTPATIENT)
Dept: PHYSICAL THERAPY | Age: 70
Setting detail: THERAPIES SERIES
Discharge: HOME | End: 2025-06-10
Payer: MEDICARE

## 2025-06-10 PROCEDURE — 97110 THERAPEUTIC EXERCISES: CPT

## 2025-06-10 PROCEDURE — 97140 MANUAL THERAPY 1/> REGIONS: CPT

## 2025-06-10 PROCEDURE — 97033 APP MDLTY 1+IONTPHRSIS EA 15: CPT

## 2025-06-10 NOTE — FLOWSHEET NOTE
Progress Update:  [x] Patient is progressing as expected towards functional goals listed.    [] Progression is slowed due to complexities/Impairments listed.  [] Progression has been slowed due to co-morbidities.  [] Plan just implemented, too soon (<30days) to assess goals progression   [] Goals require adjustment due to lack of progress  [] Patient is not progressing as expected and requires additional follow up with physician  [] Other:     TREATMENT PLAN     Frequency/Duration: 1x/week for  6-8  weeks for the following treatment interventions:    Interventions:  Therapeutic Exercise (32614) including: strength training, ROM, and functional mobility  Therapeutic Activities (07876) including: functional mobility training and education.  Neuromuscular Re-education (55839) activation and proprioception, including postural re-education.    Manual Therapy (81242) as indicated to include: Passive Range of Motion, Gr I-IV mobilizations, Soft Tissue Mobilization, and Dry Needling/IASTM  Modalities as needed that may include: NONE  Patient education on joint protection and progression of HEP    Plan: Cont POC- Continue emphasis/focus on exercise progression, improving proper muscle recruitment and activation/motor control patterns, and improving soft tissue extensibility. Next visit plan to progress weights and add new exercises      Electronically Signed by Janene Schmidt PT  Date: 06/10/2025     Note: Portions of this note have been templated and/or copied from initial evaluation, reassessments and prior notes for documentation efficiency.    Note: If patient does not return for scheduled/recommended follow up visits, this note will serve as a discharge from care along with the most recent update on progress.

## 2025-06-18 ENCOUNTER — HOSPITAL ENCOUNTER (OUTPATIENT)
Dept: PHYSICAL THERAPY | Age: 70
Setting detail: THERAPIES SERIES
Discharge: HOME | End: 2025-06-18
Payer: MEDICARE

## 2025-06-18 PROCEDURE — 97033 APP MDLTY 1+IONTPHRSIS EA 15: CPT

## 2025-06-18 PROCEDURE — 97140 MANUAL THERAPY 1/> REGIONS: CPT

## 2025-06-18 PROCEDURE — 97110 THERAPEUTIC EXERCISES: CPT

## 2025-06-18 NOTE — FLOWSHEET NOTE
[facility] - Outpatient Rehabilitation and Therapy: [address] [place] [address] office: [phone] fax: [phone]          Physical Therapy: TREATMENT/PROGRESS NOTE   Patient: Shakeel Salgado (70 y.o. male)   Examination Date: 2025   :  1955 MRN: [id number]   Visit #: 18   Insurance Allowable Auth Needed   Medicare []Yes    []No    Insurance: Payor: MEDICARE / Plan: MEDICARE PART A AND B / Product Type: *No Product type* /   Insurance ID: [id number] - (Medicare)  Secondary Insurance (if applicable): [facility]   Treatment Diagnosis: L knee pain M25.562  No diagnosis found.   Medical Diagnosis:  Pain in left knee [M25.562]   Referring Physician: Edi Quintero, *  PCP: Mingo Jimenez MD     Plan of care signed (Y/N):     Date of Patient follow up with Physician:      Plan of Care Report: YES, Date Range for this report: 2025 to 2025  POC update due: (10 visits /OR AUTH LIMITS, whichever is less)  2025                                             Medical History:  Comorbidities:  Diabetes (Type I or II)  Hypertension  Osteoarthritis  Anxiety  Relevant Medical History: chronic low back pain, L knee pain                                         Precautions/ Contra-indications:           Latex allergy:  NO  Pacemaker:    NO  Contraindications for Manipulation: None  Date of Surgery: n/a  Other:    Red Flags:  None    Suicide Screening:   The patient did not verbalize a primary behavioral concern, suicidal ideation, suicidal intent, or demonstrate suicidal behaviors.    Preferred Language for Healthcare:   [x] English       [] other:    SUBJECTIVE EXAMINATION     Patient stated complaint: Patient reports that his knee felt really good while he was on the steroids. Is off of them now (ended on )- could feel some soreness on Monday. Walked a lot on Monday.  Still feeling somewhat sore.  Would like to get ionto today.                 Test used

## 2025-06-26 ENCOUNTER — HOSPITAL ENCOUNTER (OUTPATIENT)
Dept: PHYSICAL THERAPY | Age: 70
Setting detail: THERAPIES SERIES
Discharge: HOME | End: 2025-06-26
Payer: MEDICARE

## 2025-06-26 PROCEDURE — 97110 THERAPEUTIC EXERCISES: CPT

## 2025-06-26 PROCEDURE — 97140 MANUAL THERAPY 1/> REGIONS: CPT

## 2025-06-26 NOTE — FLOWSHEET NOTE
ICD-10 code that is of complexity and severity that require skilled therapeutic intervention. This has a direct and significant impact on the need for therapy and significantly impacts the rate of recovery.   The patient had a prior episode of outpatient therapy during this calendar year for a different condition.  Current diagnosis requires skilled therapeutic intervention.    Return to Play: NA    Prognosis for POC: [x] Good [] Fair  [] Poor    Patient requires continued skilled intervention: [x] Yes  [] No      CHARGE CAPTURE     PT CHARGE GRID   CPT Code (TIMED) minutes # CPT Code (UNTIMED) #     Therex (91593)  26 2  EVAL:LOW (65913 - Typically 20 minutes face-to-face)     Neuromusc. Re-ed (78569)    Re-Eval (02146)     Manual (17040) 14 1  Estim Unattended (57033)     Ther. Act (16488)    Mech. Traction (89718)     Gait (66958)    Dry Needle 1-2 muscle (20560)     Aquatic Therex (67128)    Dry Needle 3+ muscle (20561)     Iontophoresis (08211)    VASO (61453)     Ultrasound (00596)    Group Therapy (80420)     Estim Attended (09056)    Canalith Repositioning (17458)     Physical Performance Test (63979)    Custom orthotic ()     Other:    Other:    Total Timed Code Tx Minutes 40 3       Total Treatment Minutes 40        Charge Justification:  (95858) THERAPEUTIC EXERCISE - Provided verbal/tactile cueing for HEP and/or activities related to strengthening, flexibility, endurance, ROM performed to prevent loss of range of motion, maintain or improve muscular strength or increase flexibility, following either an injury or surgery.   (78261) MANUAL THERAPY -  Manual therapy techniques, 1 or more regions, each 15 minutes (Mobilization/manipulation, manual lymphatic drainage, manual traction) for the purpose of modulating pain, promoting relaxation,  increasing ROM, reducing/eliminating soft tissue swelling/inflammation/restriction, improving soft tissue extensibility and allowing for proper ROM for normal

## 2025-07-01 ENCOUNTER — HOSPITAL ENCOUNTER (OUTPATIENT)
Dept: PHYSICAL THERAPY | Age: 70
Setting detail: THERAPIES SERIES
Discharge: HOME | End: 2025-07-01
Payer: MEDICARE

## 2025-07-01 PROCEDURE — 97140 MANUAL THERAPY 1/> REGIONS: CPT

## 2025-07-01 PROCEDURE — 97110 THERAPEUTIC EXERCISES: CPT

## 2025-07-01 NOTE — FLOWSHEET NOTE
extensibility and allowing for proper ROM for normal function with self care, mobility, lifting and ambulation    GOALS     Patient stated goal: decrease pain, walk/sit without pain  [x] Progressing: [] Met: [] Not Met: [] Adjusted    Therapist goals for Patient:   Short Term Goals: To be achieved in: 2 weeks  1Independent in HEP and progression per patient tolerance, in order to prevent re-injury.   [] Progressing: [x] Met: [] Not Met: [] Adjusted  Patient will have a decrease in pain to <0-1/10 to facilitate improvement in movement, function, and ADLs as indicated by Functional Deficits.  [] Progressing: [x] Met: [] Not Met: [] Adjusted      Long Term Goals: To be achieved in: 8 weeks  Disability index score of 35% or less for the LEFS to assist with reaching prior level of function with activities such as walking and sitting.  [] Progressing: [x] Met: [] Not Met: [] Adjusted  Patient will demonstrate increased AROM of L hip IR to 15-20 degrees without pain to allow for proper joint functioning to enable patient to decrease load to knee.   [] Progressing: [x] Met: [] Not Met: [] Adjusted  Patient will demonstrate increased Strength of L hip and knee to within 5lb with HHD of contralateral limb to allow for proper functional mobility to enable patient to return to walking and management of stairs.   [x] Progressing: [] Met: [] Not Met: [] Adjusted  Patient will return to sitting for at least 2 hours without increased symptoms or restriction. (More limited with back at this time)  [x] Progressing: [] Met: [] Not Met: [] Adjusted  Patient will be able to go up/down stairs to do laundry and walk dogs in the morning with knee pain no greater than 1/10(patient specific functional goal)    [] Progressing: [x] Met: [] Not Met: [] Adjusted       Overall Progression Towards Functional goals/ Treatment Progress Update:  [x] Patient is progressing as expected towards functional goals listed.    [] Progression is slowed due to

## 2025-07-08 ENCOUNTER — HOSPITAL ENCOUNTER (OUTPATIENT)
Dept: PHYSICAL THERAPY | Age: 70
Setting detail: THERAPIES SERIES
Discharge: HOME | End: 2025-07-08
Payer: MEDICARE

## 2025-07-08 PROCEDURE — 97110 THERAPEUTIC EXERCISES: CPT

## 2025-07-08 PROCEDURE — 97140 MANUAL THERAPY 1/> REGIONS: CPT

## 2025-07-08 NOTE — FLOWSHEET NOTE
visits, this note will serve as a discharge from care along with the most recent update on progress.

## 2025-07-12 NOTE — FLOWSHEET NOTE
[facility] - Outpatient Rehabilitation and Therapy: [address] [place] [address] office: [phone] fax: [phone]       Physical Therapy: TREATMENT/PROGRESS NOTE   Patient: Shakeel Salgado (69 y.o. male)   Examination Date: 2025   :  1955 MRN: [id number]   Visit #: 8   Insurance Allowable Auth Needed   Medicare []Yes    []No    Insurance: Payor: MEDICARE / Plan: MEDICARE PART A AND B / Product Type: *No Product type* /   Insurance ID: [id number] - (Medicare)  Secondary Insurance (if applicable): [facility]   Treatment Diagnosis: L knee pain M25.562  No diagnosis found.   Medical Diagnosis:  Pain in left knee [M25.562]   Referring Physician: Edi Quintero, *  PCP: Mingo Jimenez MD     Plan of care signed (Y/N):     Date of Patient follow up with Physician:      Plan of Care Report: NO  POC update due: (10 visits /OR AUTH LIMITS, whichever is less)  2025                                             Medical History:  Comorbidities:  Diabetes (Type I or II)  Hypertension  Osteoarthritis  Anxiety  Relevant Medical History: chronic low back pain, L knee pain                                         Precautions/ Contra-indications:           Latex allergy:  NO  Pacemaker:    NO  Contraindications for Manipulation: None  Date of Surgery: n/a  Other:    Red Flags:  None    Suicide Screening:   The patient did not verbalize a primary behavioral concern, suicidal ideation, suicidal intent, or demonstrate suicidal behaviors.    Preferred Language for Healthcare:   [x] English       [] other:    SUBJECTIVE EXAMINATION     Patient stated complaint: Patient reports that his back has been bothering him some. Is going to be getting another injection in his back on . States lateral knee is feeling better, still feeling some discomfort in knee along patellar tendon, TF joint, pes anserine. Ionto didn't do as good this past session due to pad not sticking as  ambulatory

## 2025-07-15 ENCOUNTER — HOSPITAL ENCOUNTER (OUTPATIENT)
Dept: PHYSICAL THERAPY | Age: 70
Setting detail: THERAPIES SERIES
Discharge: HOME | End: 2025-07-15
Payer: MEDICARE

## 2025-07-15 PROCEDURE — 97140 MANUAL THERAPY 1/> REGIONS: CPT

## 2025-07-15 PROCEDURE — 97110 THERAPEUTIC EXERCISES: CPT

## 2025-07-15 NOTE — PLAN OF CARE
[facility] - Outpatient Rehabilitation and Therapy: [address] [place] [address] office: [phone] fax: [phone]        Physical Therapy Re-Certification Plan of Care/MD UPDATE      Dear Dr Quintero ,    We had the pleasure of treating the following patient for physical therapy services at [facility].  A summary of our findings can be found in the updated assessment below.  This includes our plan of care.  If you have any questions or concerns regarding these findings, please do not hesitate to contact me at the office phone number checked above.  Thank you for the referral.     Physician Signature:________________________________Date:__________________  By signing above (or electronic signature), therapist’s plan is approved by physician      Current Functional Status:   Shakeel Salgado 1955 continues to present with functional deficits in ROM, strength symmetry, and endurance of strength  limiting ability with walking on even ground and walking on uneven ground .  During therapy this date, patient required progression of exercises and program and manual interventions for exercise progression, improving proper muscle recruitment and activation/motor control patterns, and improving soft tissue extensibility. Patient will continue to benefit from ongoing evaluation and advanced clinical decision from a Physical Therapist to improve muscle strength and neuromuscular control to safely return to PLOF without symptoms or restrictions.    Overall Response to Treatment:   [x]Patient is responding well to treatment and improvement is noted with regards to goals   []Patient should continue to improve in reasonable time if they continue HEP   []Patient has plateaued and is no longer responding to skilled PT intervention    []Patient is getting worse and would benefit from return to referring MD   []Patient unable to adhere to initial POC   []Other:       Total Visits:

## 2025-07-23 ENCOUNTER — HOSPITAL ENCOUNTER (OUTPATIENT)
Dept: PHYSICAL THERAPY | Age: 70
Setting detail: THERAPIES SERIES
Discharge: HOME | End: 2025-07-23
Payer: MEDICARE

## 2025-07-23 PROCEDURE — 97110 THERAPEUTIC EXERCISES: CPT

## 2025-07-23 PROCEDURE — 97140 MANUAL THERAPY 1/> REGIONS: CPT

## 2025-07-23 NOTE — FLOWSHEET NOTE
[facility]. Traction (08087)     Gait (60899)    Dry Needle 1-2 muscle (81153)     Aquatic Therex (79394)    Dry Needle 3+ muscle (73791)     Iontophoresis (58015)    VASO (50128)     Ultrasound (04756)    Group Therapy (02589)     Estim Attended (17880)    Canalith Repositioning (02193)     Physical Performance Test (38826)    Custom orthotic ()     Other:    Other:    Total Timed Code Tx Minutes 41 3       Total Treatment Minutes 41        Charge Justification:  (45963) THERAPEUTIC EXERCISE - Provided verbal/tactile cueing for HEP and/or activities related to strengthening, flexibility, endurance, ROM performed to prevent loss of range of motion, maintain or improve muscular strength or increase flexibility, following either an injury or surgery.   (72519) MANUAL THERAPY -  Manual therapy techniques, 1 or more regions, each 15 minutes (Mobilization/manipulation, manual lymphatic drainage, manual traction) for the purpose of modulating pain, promoting relaxation,  increasing ROM, reducing/eliminating soft tissue swelling/inflammation/restriction, improving soft tissue extensibility and allowing for proper ROM for normal function with self care, mobility, lifting and ambulation    GOALS     Patient stated goal: decrease pain, walk/sit without pain  [x] Progressing: [] Met: [] Not Met: [] Adjusted    Therapist goals for Patient:   Short Term Goals: To be achieved in: 2 weeks  1Independent in HEP and progression per patient tolerance, in order to prevent re-injury.   [] Progressing: [x] Met: [] Not Met: [] Adjusted  Patient will have a decrease in pain to <0-1/10 to facilitate improvement in movement, function, and ADLs as indicated by Functional Deficits.  [] Progressing: [x] Met: [] Not Met: [] Adjusted      Long Term Goals: To be achieved in: 8 weeks  Disability index score of 35% or less for the LEFS to assist with reaching prior level of function with activities such as walking and sitting.  [] Progressing: [x]

## 2025-07-30 ENCOUNTER — HOSPITAL ENCOUNTER (OUTPATIENT)
Dept: PHYSICAL THERAPY | Age: 70
Setting detail: THERAPIES SERIES
Discharge: HOME | End: 2025-07-30
Payer: MEDICARE

## 2025-07-30 PROCEDURE — 97140 MANUAL THERAPY 1/> REGIONS: CPT

## 2025-07-30 PROCEDURE — 97110 THERAPEUTIC EXERCISES: CPT

## 2025-07-30 NOTE — FLOWSHEET NOTE
movement, function, and ADLs as indicated by Functional Deficits.  [] Progressing: [x] Met: [] Not Met: [] Adjusted      Long Term Goals: To be achieved in: 8 weeks  Disability index score of 35% or less for the LEFS to assist with reaching prior level of function with activities such as walking and sitting.  [] Progressing: [x] Met: [] Not Met: [] Adjusted  Patient will demonstrate increased AROM of L hip IR to 15-20 degrees without pain to allow for proper joint functioning to enable patient to decrease load to knee.   [] Progressing: [x] Met: [] Not Met: [] Adjusted  Patient will demonstrate increased Strength of L hip and knee to within 5lb with HHD of contralateral limb to allow for proper functional mobility to enable patient to return to walking and management of stairs.   [x] Progressing: [] Met: [] Not Met: [] Adjusted  Patient will return to sitting for at least 2 hours without increased symptoms or restriction. (More limited with back at this time)  [x] Progressing: [] Met: [] Not Met: [] Adjusted  Patient will be able to go up/down stairs to do laundry and walk dogs in the morning with knee pain no greater than 1/10(patient specific functional goal)    [] Progressing: [x] Met: [] Not Met: [] Adjusted       Overall Progression Towards Functional goals/ Treatment Progress Update:  [x] Patient is progressing as expected towards functional goals listed.    [] Progression is slowed due to complexities/Impairments listed.  [] Progression has been slowed due to co-morbidities.  [] Plan just implemented, too soon (<30days) to assess goals progression   [] Goals require adjustment due to lack of progress  [] Patient is not progressing as expected and requires additional follow up with physician  [] Other:     TREATMENT PLAN     Frequency/Duration: 1x/week for 4 weeks for the following treatment interventions:    Interventions:  Therapeutic Exercise (52542) including: strength training, ROM, and functional

## 2025-08-06 ENCOUNTER — HOSPITAL ENCOUNTER (OUTPATIENT)
Dept: PHYSICAL THERAPY | Age: 70
Setting detail: THERAPIES SERIES
Discharge: HOME | End: 2025-08-06
Payer: MEDICARE

## 2025-08-06 PROCEDURE — 97110 THERAPEUTIC EXERCISES: CPT

## 2025-08-06 PROCEDURE — 97140 MANUAL THERAPY 1/> REGIONS: CPT

## 2025-08-13 ENCOUNTER — HOSPITAL ENCOUNTER (OUTPATIENT)
Dept: PHYSICAL THERAPY | Age: 70
Setting detail: THERAPIES SERIES
Discharge: HOME | End: 2025-08-13
Payer: MEDICARE

## 2025-08-13 PROCEDURE — 97110 THERAPEUTIC EXERCISES: CPT

## 2025-08-13 PROCEDURE — 97140 MANUAL THERAPY 1/> REGIONS: CPT

## 2025-08-20 ENCOUNTER — HOSPITAL ENCOUNTER (OUTPATIENT)
Dept: PHYSICAL THERAPY | Age: 70
Setting detail: THERAPIES SERIES
Discharge: HOME | End: 2025-08-20
Payer: MEDICARE

## 2025-08-20 PROCEDURE — 97110 THERAPEUTIC EXERCISES: CPT

## 2025-08-20 PROCEDURE — 97140 MANUAL THERAPY 1/> REGIONS: CPT

## 2025-08-27 ENCOUNTER — HOSPITAL ENCOUNTER (OUTPATIENT)
Dept: PHYSICAL THERAPY | Age: 70
Setting detail: THERAPIES SERIES
Discharge: HOME | End: 2025-08-27
Payer: MEDICARE

## 2025-08-27 PROCEDURE — 97110 THERAPEUTIC EXERCISES: CPT

## 2025-08-27 PROCEDURE — 97140 MANUAL THERAPY 1/> REGIONS: CPT

## 2025-09-02 ENCOUNTER — HOSPITAL ENCOUNTER (OUTPATIENT)
Dept: PHYSICAL THERAPY | Age: 70
Setting detail: THERAPIES SERIES
Discharge: HOME | End: 2025-09-02
Payer: MEDICARE

## 2025-09-02 PROCEDURE — 97110 THERAPEUTIC EXERCISES: CPT

## 2025-09-02 PROCEDURE — 97140 MANUAL THERAPY 1/> REGIONS: CPT

## (undated) DEVICE — SOLUTION IV 1000ML 0.9% SOD CHL

## (undated) DEVICE — PADDING CAST W4INXL4YD HIGHLY ABSRB THAN COT EZ APPL

## (undated) DEVICE — CABLE BPLR L12FT FLYING LD DISPOSABLE

## (undated) DEVICE — SYRINGE MED 30ML STD CLR PLAS LUERLOCK TIP N CTRL DISP

## (undated) DEVICE — SPLINT PLSTR OF PARIS W4XL15IN EXTRA FAST SET GYPS S

## (undated) DEVICE — GOWN,SIRUS,POLYRNF,BRTHSLV,XLN/XL,20/CS: Brand: MEDLINE

## (undated) DEVICE — CHLORAPREP 26ML ORANGE

## (undated) DEVICE — ZIMMER® STERILE DISPOSABLE TOURNIQUET CUFF WITH PLC, DUAL PORT, SINGLE BLADDER, 18 IN. (46 CM)

## (undated) DEVICE — FRAZIER SUCTION INSTRUMENT 10 FR W/CONTROL VENT & OBTURATOR: Brand: FRAZIER

## (undated) DEVICE — 3M™ IOBAN™ 2 ANTIMICROBIAL INCISE DRAPE 6640EZ: Brand: IOBAN™ 2

## (undated) DEVICE — SLING ARM L L17 1/2IN D8.5IN COT POLY DLX W/ SHLDR PD

## (undated) DEVICE — COVER LT HNDL BLU PLAS

## (undated) DEVICE — SUTURE NONABSORBABLE MONOFILAMENT 5-0 C-1 1X24 IN PROLENE 8725H

## (undated) DEVICE — DRAPE C ARM W54XL84IN MINI FOR OEC 6800

## (undated) DEVICE — GLOVE SURG SZ 75 L12IN FNGR THK13MIL BRN LTX SYN POLYMER W

## (undated) DEVICE — Device

## (undated) DEVICE — SUTURE ETHLN SZ 4-0 L18IN NONABSORBABLE BLK L19MM PS-2 3/8 1667H

## (undated) DEVICE — SUTURE NONABSORBABLE MONOFILAMENT 4-0 PS-2 18 IN BLK ETHILON 1667G

## (undated) DEVICE — UNDERGLOVE SURG SZ 8 BLU LTX FREE SYN POLYISOPRENE POLYMER

## (undated) DEVICE — DRAPE SURGICAL HAND PROX AURORA

## (undated) DEVICE — 1010 S-DRAPE TOWEL DRAPE 10/BX: Brand: STERI-DRAPE™

## (undated) DEVICE — BLADE OPHTH 180DEG CUT SURF BLU STR SHRP DBL BVL GRINDLESS

## (undated) DEVICE — PODIATRY PK

## (undated) DEVICE — GLOVE SURG SZ 8 L12IN FNGR THK79MIL GRN LTX FREE

## (undated) DEVICE — DRAPE 70X60IN SPLIT IMPERV ADHES STRIP

## (undated) DEVICE — CATHETER IV 18 GAX125 IN WNG INTROCAN SAFETY

## (undated) DEVICE — CAUTERY ES L0.5IN FN TIP HI TEMP ACCU-TEMP

## (undated) DEVICE — INTENDED FOR TISSUE SEPARATION, AND OTHER PROCEDURES THAT REQUIRE A SHARP SURGICAL BLADE TO PUNCTURE OR CUT.: Brand: BARD-PARKER ® CARBON RIB-BACK BLADES

## (undated) DEVICE — GARMENT,MEDLINE,DVT,INT,CALF,MED, GEN2: Brand: MEDLINE

## (undated) DEVICE — TURNOVER KIT RM INF CTRL TECH

## (undated) DEVICE — SUTURE PDS II SZ 4-0 L27IN ABSRB VLT L17MM RB-1 1/2 CIR Z304H